# Patient Record
Sex: MALE | Race: WHITE | Employment: OTHER | URBAN - NONMETROPOLITAN AREA
[De-identification: names, ages, dates, MRNs, and addresses within clinical notes are randomized per-mention and may not be internally consistent; named-entity substitution may affect disease eponyms.]

---

## 2016-11-17 LAB — PROSTATE SPECIFIC ANTIGEN: 0.01 NG/ML

## 2017-02-13 ENCOUNTER — OFFICE VISIT (OUTPATIENT)
Dept: UROLOGY | Age: 67
End: 2017-02-13

## 2017-02-13 VITALS
BODY MASS INDEX: 42.18 KG/M2 | WEIGHT: 311.4 LBS | HEIGHT: 72 IN | SYSTOLIC BLOOD PRESSURE: 134 MMHG | DIASTOLIC BLOOD PRESSURE: 88 MMHG

## 2017-02-13 DIAGNOSIS — N52.31 ERECTILE DYSFUNCTION FOLLOWING RADICAL PROSTATECTOMY: ICD-10-CM

## 2017-02-13 DIAGNOSIS — C61 PROSTATE CANCER (HCC): ICD-10-CM

## 2017-02-13 DIAGNOSIS — R39.12 WEAK URINARY STREAM: Primary | ICD-10-CM

## 2017-02-13 LAB — POST VOID RESIDUAL (PVR): 97 ML

## 2017-02-13 PROCEDURE — 51798 US URINE CAPACITY MEASURE: CPT | Performed by: UROLOGY

## 2017-02-13 PROCEDURE — 99203 OFFICE O/P NEW LOW 30 MIN: CPT | Performed by: UROLOGY

## 2017-02-13 RX ORDER — LEVOTHYROXINE SODIUM 0.03 MG/1
25 TABLET ORAL DAILY
COMMUNITY

## 2017-02-13 RX ORDER — VITAMIN B COMPLEX
1 CAPSULE ORAL DAILY
COMMUNITY
End: 2017-06-12

## 2017-02-13 ASSESSMENT — ENCOUNTER SYMPTOMS
EYE PAIN: 0
NAUSEA: 0
ABDOMINAL PAIN: 0
CHEST TIGHTNESS: 0
SHORTNESS OF BREATH: 0
COLOR CHANGE: 0
EYE REDNESS: 0
BACK PAIN: 0

## 2017-02-15 ENCOUNTER — TELEPHONE (OUTPATIENT)
Dept: UROLOGY | Age: 67
End: 2017-02-15

## 2017-02-15 DIAGNOSIS — R39.12 WEAK URINARY STREAM: Primary | ICD-10-CM

## 2017-02-15 DIAGNOSIS — C61 PROSTATE CANCER (HCC): ICD-10-CM

## 2017-02-15 DIAGNOSIS — Z01.818 PRE-OP TESTING: ICD-10-CM

## 2017-02-15 DIAGNOSIS — I10 ESSENTIAL HYPERTENSION: ICD-10-CM

## 2017-02-16 ENCOUNTER — TELEPHONE (OUTPATIENT)
Dept: UROLOGY | Age: 67
End: 2017-02-16

## 2017-03-02 ENCOUNTER — TELEPHONE (OUTPATIENT)
Dept: UROLOGY | Age: 67
End: 2017-03-02

## 2017-03-02 DIAGNOSIS — Z01.818 PRE-OP TESTING: ICD-10-CM

## 2017-03-02 DIAGNOSIS — R39.12 WEAK URINARY STREAM: Primary | ICD-10-CM

## 2017-03-17 ENCOUNTER — TELEPHONE (OUTPATIENT)
Dept: UROLOGY | Age: 67
End: 2017-03-17

## 2017-03-24 ENCOUNTER — OFFICE VISIT (OUTPATIENT)
Dept: UROLOGY | Age: 67
End: 2017-03-24

## 2017-03-24 VITALS
BODY MASS INDEX: 41.31 KG/M2 | WEIGHT: 305 LBS | HEIGHT: 72 IN | SYSTOLIC BLOOD PRESSURE: 140 MMHG | DIASTOLIC BLOOD PRESSURE: 70 MMHG

## 2017-03-24 DIAGNOSIS — R39.12 WEAK URINARY STREAM: Primary | ICD-10-CM

## 2017-03-24 PROCEDURE — 99024 POSTOP FOLLOW-UP VISIT: CPT | Performed by: UROLOGY

## 2017-03-24 PROCEDURE — 51700 IRRIGATION OF BLADDER: CPT | Performed by: UROLOGY

## 2017-04-11 ENCOUNTER — TELEPHONE (OUTPATIENT)
Dept: UROLOGY | Age: 67
End: 2017-04-11

## 2017-04-11 DIAGNOSIS — R30.0 DYSURIA: Primary | ICD-10-CM

## 2017-04-11 DIAGNOSIS — C61 PROSTATE CANCER (HCC): Primary | ICD-10-CM

## 2017-04-13 ENCOUNTER — TELEPHONE (OUTPATIENT)
Dept: UROLOGY | Age: 67
End: 2017-04-13

## 2017-04-17 ENCOUNTER — TELEPHONE (OUTPATIENT)
Dept: UROLOGY | Age: 67
End: 2017-04-17

## 2017-04-17 RX ORDER — SULFAMETHOXAZOLE AND TRIMETHOPRIM 800; 160 MG/1; MG/1
1 TABLET ORAL 2 TIMES DAILY
Qty: 20 TABLET | Refills: 0 | Status: SHIPPED | OUTPATIENT
Start: 2017-04-17 | End: 2017-04-27

## 2017-04-27 ENCOUNTER — TELEPHONE (OUTPATIENT)
Dept: UROLOGY | Age: 67
End: 2017-04-27

## 2017-04-27 DIAGNOSIS — N39.0 URINARY TRACT INFECTION WITHOUT HEMATURIA, SITE UNSPECIFIED: ICD-10-CM

## 2017-04-27 DIAGNOSIS — R30.0 DYSURIA: Primary | ICD-10-CM

## 2017-05-01 ENCOUNTER — TELEPHONE (OUTPATIENT)
Dept: UROLOGY | Age: 67
End: 2017-05-01

## 2017-05-15 ENCOUNTER — TELEPHONE (OUTPATIENT)
Dept: UROLOGY | Age: 67
End: 2017-05-15

## 2017-05-15 RX ORDER — DOXYCYCLINE HYCLATE 100 MG
100 TABLET ORAL 2 TIMES DAILY
Qty: 20 TABLET | Refills: 0 | Status: SHIPPED | OUTPATIENT
Start: 2017-05-15 | End: 2017-05-25

## 2017-05-25 ENCOUNTER — TELEPHONE (OUTPATIENT)
Dept: UROLOGY | Age: 67
End: 2017-05-25

## 2017-05-25 DIAGNOSIS — R30.0 DYSURIA: Primary | ICD-10-CM

## 2017-05-25 RX ORDER — METHENAMINE, SODIUM PHOSPHATE, MONOBASIC, MONOHYDRATE, PHENYL SALICYLATE, METHYLENE BLUE, AND HYOSCYAMINE SULFATE 120; 40.8; 36; 10; .12 MG/1; MG/1; MG/1; MG/1; MG/1
1 CAPSULE ORAL 3 TIMES DAILY PRN
Qty: 30 CAPSULE | Refills: 1 | Status: SHIPPED | OUTPATIENT
Start: 2017-05-25 | End: 2017-08-03 | Stop reason: ALTCHOICE

## 2017-06-12 ENCOUNTER — OFFICE VISIT (OUTPATIENT)
Dept: UROLOGY | Age: 67
End: 2017-06-12

## 2017-06-12 VITALS
HEIGHT: 75 IN | DIASTOLIC BLOOD PRESSURE: 80 MMHG | BODY MASS INDEX: 38.42 KG/M2 | SYSTOLIC BLOOD PRESSURE: 142 MMHG | WEIGHT: 309 LBS

## 2017-06-12 DIAGNOSIS — R30.0 DYSURIA: Primary | ICD-10-CM

## 2017-06-12 DIAGNOSIS — N32.0 BLADDER NECK CONTRACTURE: ICD-10-CM

## 2017-06-12 DIAGNOSIS — R33.9 INCOMPLETE BLADDER EMPTYING: ICD-10-CM

## 2017-06-12 DIAGNOSIS — C61 PROSTATE CANCER (HCC): ICD-10-CM

## 2017-06-12 LAB
BILIRUBIN, POC: NORMAL
BLOOD URINE, POC: NORMAL
CLARITY, POC: CLEAR
COLOR, POC: YELLOW
GLUCOSE URINE, POC: NORMAL
KETONES, POC: NORMAL
LEUKOCYTE EST, POC: NORMAL
NITRITE, POC: NORMAL
PH, POC: 7
POST VOID RESIDUAL (PVR): 82 ML
PROTEIN, POC: NORMAL
SPECIFIC GRAVITY, POC: 1.01
UROBILINOGEN, POC: NORMAL

## 2017-06-12 PROCEDURE — 81003 URINALYSIS AUTO W/O SCOPE: CPT | Performed by: UROLOGY

## 2017-06-12 PROCEDURE — 99213 OFFICE O/P EST LOW 20 MIN: CPT | Performed by: UROLOGY

## 2017-06-12 PROCEDURE — 51798 US URINE CAPACITY MEASURE: CPT | Performed by: UROLOGY

## 2017-06-12 RX ORDER — BENAZEPRIL HYDROCHLORIDE 20 MG/1
20 TABLET ORAL 2 TIMES DAILY
COMMUNITY

## 2017-06-12 RX ORDER — HYDROCHLOROTHIAZIDE 25 MG/1
25 TABLET ORAL DAILY
COMMUNITY
End: 2017-08-03 | Stop reason: ALTCHOICE

## 2017-08-03 ENCOUNTER — OFFICE VISIT (OUTPATIENT)
Dept: SURGERY | Age: 67
End: 2017-08-03
Payer: MEDICARE

## 2017-08-03 VITALS
DIASTOLIC BLOOD PRESSURE: 84 MMHG | HEART RATE: 64 BPM | SYSTOLIC BLOOD PRESSURE: 144 MMHG | BODY MASS INDEX: 40.29 KG/M2 | WEIGHT: 304 LBS | HEIGHT: 73 IN | TEMPERATURE: 97.4 F | RESPIRATION RATE: 20 BRPM

## 2017-08-03 DIAGNOSIS — R10.33 PERIUMBILICAL ABDOMINAL PAIN: Primary | ICD-10-CM

## 2017-08-03 PROCEDURE — 99203 OFFICE O/P NEW LOW 30 MIN: CPT | Performed by: SURGERY

## 2017-08-03 PROCEDURE — G8427 DOCREV CUR MEDS BY ELIG CLIN: HCPCS | Performed by: SURGERY

## 2017-08-03 PROCEDURE — 3017F COLORECTAL CA SCREEN DOC REV: CPT | Performed by: SURGERY

## 2017-08-03 PROCEDURE — G8417 CALC BMI ABV UP PARAM F/U: HCPCS | Performed by: SURGERY

## 2017-08-03 PROCEDURE — 1036F TOBACCO NON-USER: CPT | Performed by: SURGERY

## 2017-08-03 RX ORDER — FUROSEMIDE 20 MG/1
20 TABLET ORAL
COMMUNITY

## 2017-08-03 ASSESSMENT — ENCOUNTER SYMPTOMS
PHOTOPHOBIA: 0
WHEEZING: 0
TROUBLE SWALLOWING: 0
EYE REDNESS: 0
EYE ITCHING: 0
SORE THROAT: 0
SINUS PRESSURE: 0
DIARRHEA: 0
BLOOD IN STOOL: 0
BACK PAIN: 0
RECTAL PAIN: 0
CONSTIPATION: 0
COLOR CHANGE: 0
COUGH: 0
ANAL BLEEDING: 0
ABDOMINAL DISTENTION: 0
EYE DISCHARGE: 0
ABDOMINAL PAIN: 0
STRIDOR: 0
NAUSEA: 0
SHORTNESS OF BREATH: 0
RHINORRHEA: 0
CHEST TIGHTNESS: 0
VOMITING: 0
EYE PAIN: 0
APNEA: 0
VOICE CHANGE: 0
CHOKING: 0
FACIAL SWELLING: 0

## 2017-09-11 ENCOUNTER — OFFICE VISIT (OUTPATIENT)
Dept: UROLOGY | Age: 67
End: 2017-09-11
Payer: MEDICARE

## 2017-09-11 VITALS
DIASTOLIC BLOOD PRESSURE: 88 MMHG | WEIGHT: 303.8 LBS | BODY MASS INDEX: 41.15 KG/M2 | SYSTOLIC BLOOD PRESSURE: 134 MMHG | HEIGHT: 72 IN

## 2017-09-11 DIAGNOSIS — C61 PROSTATE CANCER (HCC): Primary | ICD-10-CM

## 2017-09-11 DIAGNOSIS — N32.0 ACQUIRED CONTRACTURE OF BLADDER NECK: ICD-10-CM

## 2017-09-11 LAB
BILIRUBIN, POC: NORMAL
BLOOD URINE, POC: NORMAL
CLARITY, POC: CLEAR
COLOR, POC: YELLOW
GLUCOSE URINE, POC: NORMAL
KETONES, POC: NORMAL
LEUKOCYTE EST, POC: NORMAL
NITRITE, POC: NORMAL
PH, POC: 5.52
POST VOID RESIDUAL (PVR): 0 ML
PROTEIN, POC: NORMAL
SPECIFIC GRAVITY, POC: 1.01
UROBILINOGEN, POC: NORMAL

## 2017-09-11 PROCEDURE — 4040F PNEUMOC VAC/ADMIN/RCVD: CPT | Performed by: UROLOGY

## 2017-09-11 PROCEDURE — 81003 URINALYSIS AUTO W/O SCOPE: CPT | Performed by: UROLOGY

## 2017-09-11 PROCEDURE — G8427 DOCREV CUR MEDS BY ELIG CLIN: HCPCS | Performed by: UROLOGY

## 2017-09-11 PROCEDURE — 1123F ACP DISCUSS/DSCN MKR DOCD: CPT | Performed by: UROLOGY

## 2017-09-11 PROCEDURE — 1036F TOBACCO NON-USER: CPT | Performed by: UROLOGY

## 2017-09-11 PROCEDURE — 51798 US URINE CAPACITY MEASURE: CPT | Performed by: UROLOGY

## 2017-09-11 PROCEDURE — G8417 CALC BMI ABV UP PARAM F/U: HCPCS | Performed by: UROLOGY

## 2017-09-11 PROCEDURE — 3017F COLORECTAL CA SCREEN DOC REV: CPT | Performed by: UROLOGY

## 2017-09-11 PROCEDURE — 99213 OFFICE O/P EST LOW 20 MIN: CPT | Performed by: UROLOGY

## 2018-02-26 LAB
ALBUMIN SERPL-MCNC: 4.5 G/DL
ALP BLD-CCNC: 58 U/L
ALT SERPL-CCNC: 41 U/L
ANION GAP SERPL CALCULATED.3IONS-SCNC: NORMAL MMOL/L
AST SERPL-CCNC: 31 U/L
AVERAGE GLUCOSE: 151
BILIRUB SERPL-MCNC: 0.7 MG/DL (ref 0.1–1.4)
BUN BLDV-MCNC: 13 MG/DL
CALCIUM SERPL-MCNC: 9.3 MG/DL
CHLORIDE BLD-SCNC: 101 MMOL/L
CHOLESTEROL, TOTAL: 177 MG/DL
CHOLESTEROL/HDL RATIO: ABNORMAL
CO2: 25 MMOL/L
CREAT SERPL-MCNC: 0.8 MG/DL
GFR CALCULATED: NORMAL
GLUCOSE BLD-MCNC: 139 MG/DL
HBA1C MFR BLD: 6.9 %
HDLC SERPL-MCNC: 34 MG/DL (ref 35–70)
LDL CHOLESTEROL CALCULATED: 73 MG/DL (ref 0–160)
POTASSIUM SERPL-SCNC: 4.2 MMOL/L
SODIUM BLD-SCNC: 141 MMOL/L
TOTAL PROTEIN: 7
TRIGL SERPL-MCNC: 350 MG/DL
VLDLC SERPL CALC-MCNC: 70 MG/DL

## 2018-03-22 ENCOUNTER — OFFICE VISIT (OUTPATIENT)
Dept: UROLOGY | Age: 68
End: 2018-03-22
Payer: MEDICARE

## 2018-03-22 VITALS
WEIGHT: 296 LBS | SYSTOLIC BLOOD PRESSURE: 136 MMHG | DIASTOLIC BLOOD PRESSURE: 80 MMHG | HEIGHT: 72 IN | BODY MASS INDEX: 40.09 KG/M2

## 2018-03-22 DIAGNOSIS — C61 PROSTATE CA (HCC): Primary | ICD-10-CM

## 2018-03-22 DIAGNOSIS — N32.0 BLADDER NECK CONTRACTURE: ICD-10-CM

## 2018-03-22 LAB
BILIRUBIN, POC: NORMAL
BLOOD URINE, POC: NORMAL
CLARITY, POC: CLEAR
COLOR, POC: YELLOW
GLUCOSE URINE, POC: NORMAL
KETONES, POC: NORMAL
LEUKOCYTE EST, POC: NORMAL
NITRITE, POC: NORMAL
PH, POC: 6
PROTEIN, POC: NORMAL
SPECIFIC GRAVITY, POC: 1.02
UROBILINOGEN, POC: 0.2

## 2018-03-22 PROCEDURE — 1036F TOBACCO NON-USER: CPT | Performed by: NURSE PRACTITIONER

## 2018-03-22 PROCEDURE — 4040F PNEUMOC VAC/ADMIN/RCVD: CPT | Performed by: NURSE PRACTITIONER

## 2018-03-22 PROCEDURE — G8427 DOCREV CUR MEDS BY ELIG CLIN: HCPCS | Performed by: NURSE PRACTITIONER

## 2018-03-22 PROCEDURE — G8417 CALC BMI ABV UP PARAM F/U: HCPCS | Performed by: NURSE PRACTITIONER

## 2018-03-22 PROCEDURE — G8484 FLU IMMUNIZE NO ADMIN: HCPCS | Performed by: NURSE PRACTITIONER

## 2018-03-22 PROCEDURE — 81003 URINALYSIS AUTO W/O SCOPE: CPT | Performed by: NURSE PRACTITIONER

## 2018-03-22 PROCEDURE — 1123F ACP DISCUSS/DSCN MKR DOCD: CPT | Performed by: NURSE PRACTITIONER

## 2018-03-22 PROCEDURE — 99213 OFFICE O/P EST LOW 20 MIN: CPT | Performed by: NURSE PRACTITIONER

## 2018-03-22 PROCEDURE — 3017F COLORECTAL CA SCREEN DOC REV: CPT | Performed by: NURSE PRACTITIONER

## 2018-03-22 RX ORDER — FENOFIBRATE 145 MG/1
145 TABLET, COATED ORAL DAILY
COMMUNITY

## 2018-03-22 ASSESSMENT — ENCOUNTER SYMPTOMS
NAUSEA: 0
ABDOMINAL PAIN: 0
VOMITING: 0

## 2019-03-12 ENCOUNTER — TELEPHONE (OUTPATIENT)
Dept: UROLOGY | Age: 69
End: 2019-03-12

## 2019-03-13 LAB — PROSTATE SPECIFIC ANTIGEN: NORMAL NG/ML

## 2019-03-21 ENCOUNTER — TELEPHONE (OUTPATIENT)
Dept: UROLOGY | Age: 69
End: 2019-03-21

## 2019-03-21 ENCOUNTER — OFFICE VISIT (OUTPATIENT)
Dept: UROLOGY | Age: 69
End: 2019-03-21
Payer: MEDICARE

## 2019-03-21 VITALS
BODY MASS INDEX: 41.99 KG/M2 | WEIGHT: 310 LBS | HEIGHT: 72 IN | SYSTOLIC BLOOD PRESSURE: 136 MMHG | DIASTOLIC BLOOD PRESSURE: 80 MMHG

## 2019-03-21 DIAGNOSIS — N32.0 BLADDER NECK CONTRACTURE: ICD-10-CM

## 2019-03-21 DIAGNOSIS — N52.31 ERECTILE DYSFUNCTION AFTER RADICAL PROSTATECTOMY: ICD-10-CM

## 2019-03-21 DIAGNOSIS — C61 PROSTATE CA (HCC): Primary | ICD-10-CM

## 2019-03-21 LAB
BILIRUBIN, POC: NORMAL
BLOOD URINE, POC: NORMAL
CLARITY, POC: CLEAR
COLOR, POC: YELLOW
GLUCOSE URINE, POC: NORMAL
KETONES, POC: NORMAL
LEUKOCYTE EST, POC: NORMAL
NITRITE, POC: NORMAL
PH, POC: 7
POST VOID RESIDUAL (PVR): 0 ML
PROTEIN, POC: NORMAL
SPECIFIC GRAVITY, POC: 1.02
UROBILINOGEN, POC: 0.2

## 2019-03-21 PROCEDURE — 51798 US URINE CAPACITY MEASURE: CPT | Performed by: NURSE PRACTITIONER

## 2019-03-21 PROCEDURE — G8484 FLU IMMUNIZE NO ADMIN: HCPCS | Performed by: NURSE PRACTITIONER

## 2019-03-21 PROCEDURE — 1123F ACP DISCUSS/DSCN MKR DOCD: CPT | Performed by: NURSE PRACTITIONER

## 2019-03-21 PROCEDURE — 3017F COLORECTAL CA SCREEN DOC REV: CPT | Performed by: NURSE PRACTITIONER

## 2019-03-21 PROCEDURE — 99213 OFFICE O/P EST LOW 20 MIN: CPT | Performed by: NURSE PRACTITIONER

## 2019-03-21 PROCEDURE — G8427 DOCREV CUR MEDS BY ELIG CLIN: HCPCS | Performed by: NURSE PRACTITIONER

## 2019-03-21 PROCEDURE — 1101F PT FALLS ASSESS-DOCD LE1/YR: CPT | Performed by: NURSE PRACTITIONER

## 2019-03-21 PROCEDURE — 81003 URINALYSIS AUTO W/O SCOPE: CPT | Performed by: NURSE PRACTITIONER

## 2019-03-21 PROCEDURE — G8417 CALC BMI ABV UP PARAM F/U: HCPCS | Performed by: NURSE PRACTITIONER

## 2019-03-21 PROCEDURE — 1036F TOBACCO NON-USER: CPT | Performed by: NURSE PRACTITIONER

## 2019-03-21 PROCEDURE — 4040F PNEUMOC VAC/ADMIN/RCVD: CPT | Performed by: NURSE PRACTITIONER

## 2019-03-21 RX ORDER — LORAZEPAM 0.5 MG
TABLET ORAL
COMMUNITY
End: 2020-03-04

## 2019-03-21 ASSESSMENT — ENCOUNTER SYMPTOMS
VOMITING: 0
NAUSEA: 0
ABDOMINAL PAIN: 0

## 2019-10-28 LAB
ALBUMIN SERPL-MCNC: 4.3 G/DL
ALP BLD-CCNC: 62 U/L
ALT SERPL-CCNC: 41 U/L
ANION GAP SERPL CALCULATED.3IONS-SCNC: NORMAL MMOL/L
AST SERPL-CCNC: 30 U/L
AVERAGE GLUCOSE: 243
BASOPHILS ABSOLUTE: 0 /ΜL
BASOPHILS RELATIVE PERCENT: 0.8 %
BILIRUB SERPL-MCNC: 0.8 MG/DL (ref 0.1–1.4)
BUN BLDV-MCNC: 11 MG/DL
CALCIUM SERPL-MCNC: 9.3 MG/DL
CHLORIDE BLD-SCNC: 102 MMOL/L
CHOLESTEROL, TOTAL: 149 MG/DL
CHOLESTEROL/HDL RATIO: ABNORMAL
CO2: 21 MMOL/L
CREAT SERPL-MCNC: 0.5 MG/DL
EOSINOPHILS ABSOLUTE: 0.2 /ΜL
EOSINOPHILS RELATIVE PERCENT: 3.5 %
GFR CALCULATED: NORMAL
GLUCOSE BLD-MCNC: 259 MG/DL
HBA1C MFR BLD: 10.1 %
HCT VFR BLD CALC: 45 % (ref 41–53)
HDLC SERPL-MCNC: 30 MG/DL (ref 35–70)
HEMOGLOBIN: 16 G/DL (ref 13.5–17.5)
LDL CHOLESTEROL CALCULATED: ABNORMAL MG/DL (ref 0–160)
LYMPHOCYTES ABSOLUTE: 2 /ΜL
LYMPHOCYTES RELATIVE PERCENT: 41.9 %
MCH RBC QN AUTO: 34 PG
MCHC RBC AUTO-ENTMCNC: 35.6 G/DL
MCV RBC AUTO: 95.7 FL
MONOCYTES ABSOLUTE: 0.7 /ΜL
MONOCYTES RELATIVE PERCENT: 14.9 %
NEUTROPHILS ABSOLUTE: 1.9 /ΜL
NEUTROPHILS RELATIVE PERCENT: 38.3 %
PDW BLD-RTO: 11.4 %
PLATELET # BLD: 170 K/ΜL
PMV BLD AUTO: 10 FL
POTASSIUM SERPL-SCNC: 4 MMOL/L
RBC # BLD: 4.7 10^6/ΜL
SODIUM BLD-SCNC: 140 MMOL/L
TOTAL PROTEIN: 6.9
TRIGL SERPL-MCNC: 458 MG/DL
VLDLC SERPL CALC-MCNC: ABNORMAL MG/DL
WBC # BLD: 4.8 10^3/ML

## 2020-03-02 LAB — PROSTATE SPECIFIC ANTIGEN: NORMAL

## 2020-03-04 ENCOUNTER — OFFICE VISIT (OUTPATIENT)
Dept: UROLOGY | Age: 70
End: 2020-03-04
Payer: MEDICARE

## 2020-03-04 VITALS
WEIGHT: 297 LBS | HEIGHT: 72 IN | DIASTOLIC BLOOD PRESSURE: 70 MMHG | BODY MASS INDEX: 40.23 KG/M2 | SYSTOLIC BLOOD PRESSURE: 116 MMHG

## 2020-03-04 LAB
BILIRUBIN, POC: NORMAL
BLOOD URINE, POC: NORMAL
CLARITY, POC: CLEAR
COLOR, POC: YELLOW
GLUCOSE URINE, POC: NORMAL
KETONES, POC: NORMAL
LEUKOCYTE EST, POC: NORMAL
NITRITE, POC: NORMAL
PH, POC: 5.5
POST VOID RESIDUAL (PVR): 36 ML
PROTEIN, POC: NORMAL
SPECIFIC GRAVITY, POC: 1.02
UROBILINOGEN, POC: 1

## 2020-03-04 PROCEDURE — 1036F TOBACCO NON-USER: CPT | Performed by: NURSE PRACTITIONER

## 2020-03-04 PROCEDURE — G8427 DOCREV CUR MEDS BY ELIG CLIN: HCPCS | Performed by: NURSE PRACTITIONER

## 2020-03-04 PROCEDURE — G8484 FLU IMMUNIZE NO ADMIN: HCPCS | Performed by: NURSE PRACTITIONER

## 2020-03-04 PROCEDURE — G8417 CALC BMI ABV UP PARAM F/U: HCPCS | Performed by: NURSE PRACTITIONER

## 2020-03-04 PROCEDURE — 4040F PNEUMOC VAC/ADMIN/RCVD: CPT | Performed by: NURSE PRACTITIONER

## 2020-03-04 PROCEDURE — 99213 OFFICE O/P EST LOW 20 MIN: CPT | Performed by: NURSE PRACTITIONER

## 2020-03-04 PROCEDURE — 3017F COLORECTAL CA SCREEN DOC REV: CPT | Performed by: NURSE PRACTITIONER

## 2020-03-04 PROCEDURE — 1123F ACP DISCUSS/DSCN MKR DOCD: CPT | Performed by: NURSE PRACTITIONER

## 2020-03-04 PROCEDURE — 51798 US URINE CAPACITY MEASURE: CPT | Performed by: NURSE PRACTITIONER

## 2020-03-04 PROCEDURE — 81003 URINALYSIS AUTO W/O SCOPE: CPT | Performed by: NURSE PRACTITIONER

## 2020-03-04 RX ORDER — ACETAMINOPHEN 160 MG
TABLET,DISINTEGRATING ORAL
COMMUNITY

## 2020-03-04 RX ORDER — MULTIVITAMIN WITH IRON
250 TABLET ORAL 2 TIMES DAILY
COMMUNITY

## 2020-03-04 RX ORDER — SILDENAFIL CITRATE 20 MG/1
20 TABLET ORAL PRN
COMMUNITY
End: 2022-05-18

## 2020-03-04 ASSESSMENT — ENCOUNTER SYMPTOMS
ABDOMINAL PAIN: 0
BACK PAIN: 0

## 2020-03-04 NOTE — PROGRESS NOTES
History  Ashok  has a past medical history of Arthritis, Cervical pain, History of kidney stones, Hypertension, DELMER on CPAP, Prostate cancer ([address] Shingles, Strain of quadriceps muscle, and Thyroid disease. Past Surgical History  The patient  has a past surgical history that includes Prostate surgery (12/2008); Leg Tendon Surgery (03/2008); hernia repair (2008); Tonsillectomy (1958); Colonoscopy (03/10/2017); Cystocopy (03/20/2017); and Neck surgery (09/2017). Family History  This patient's family history includes Dementia in his father. Social History  Masoud Jules  reports that he has never smoked. He has never used smokeless tobacco. He reports current alcohol use. He reports that he does not use drugs. Subjective:      Review of Systems   Constitutional: Negative for activity change, appetite change, chills, diaphoresis, fatigue, fever and unexpected weight change. Gastrointestinal: Negative for abdominal pain. Genitourinary: Negative for decreased urine volume, difficulty urinating, dysuria, flank pain, frequency, hematuria and urgency. Musculoskeletal: Negative for back pain. Objective:   /70   Ht 6' (1.829 m)   Wt 297 lb (134.7 kg)   BMI 40.28 kg/m²     Physical Exam  Constitutional:       General: He is not in acute distress. Appearance: Normal appearance. He is not ill-appearing. HENT:      Head: Normocephalic and atraumatic. Eyes:      General: No scleral icterus. Right eye: No discharge. Left eye: No discharge. Cardiovascular:      Rate and Rhythm: Normal rate and regular rhythm. Heart sounds: Normal heart sounds. Pulmonary:      Effort: Pulmonary effort is normal. No respiratory distress. Breath sounds: Normal breath sounds. Abdominal:      General: There is no distension. Tenderness: There is no abdominal tenderness. Skin:     General: Skin is warm and dry. Capillary Refill: Capillary refill takes less than 2 seconds. Neurological:      General: No focal deficit present. Mental Status: He is alert. Psychiatric:         Mood and Affect: Mood normal.         Behavior: Behavior normal.         POC  No results found for this visit on 03/04/20. Patients recent PSA values are as follows  Lab Results   Component Value Date    PSA  03/02/2020      Comment:      <0.03    PSA  03/13/2019      Comment:      <0.03    PSA 0.01 11/17/2016        Recent BUN/Creatinine:  Lab Results   Component Value Date    BUN 11 10/28/2019    CREATININE 0.5 10/28/2019         Assessment:   Prostate cancer  Hx of bladder neck contracture  Erectile dysfunction  Hx kidney stones    Plan:     Pt urinating well. PVR 36 mls. PSA undetectable. Discussed treatment for ED. Pt reports he has sildenafil at home and he may try it. However this isn't a major priority for him. F/u in 1 year with PVR. PSA prior to appt.

## 2021-02-10 LAB
BUN BLDV-MCNC: 17 MG/DL
CALCIUM SERPL-MCNC: 9.4 MG/DL
CHLORIDE BLD-SCNC: 102 MMOL/L
CO2: 25 MMOL/L
CREAT SERPL-MCNC: 0.8 MG/DL
GFR CALCULATED: >60
GLUCOSE BLD-MCNC: 125 MG/DL
POTASSIUM SERPL-SCNC: 4.1 MMOL/L
PROSTATE SPECIFIC ANTIGEN: NORMAL
SODIUM BLD-SCNC: 136 MMOL/L

## 2021-02-11 ENCOUNTER — TELEPHONE (OUTPATIENT)
Dept: UROLOGY | Age: 71
End: 2021-02-11

## 2021-03-03 ENCOUNTER — OFFICE VISIT (OUTPATIENT)
Dept: UROLOGY | Age: 71
End: 2021-03-03
Payer: MEDICARE

## 2021-03-03 VITALS — BODY MASS INDEX: 40.36 KG/M2 | WEIGHT: 298 LBS | TEMPERATURE: 97.4 F | HEIGHT: 72 IN

## 2021-03-03 DIAGNOSIS — C61 PROSTATE CANCER (HCC): Primary | ICD-10-CM

## 2021-03-03 DIAGNOSIS — Z87.442 PERSONAL HISTORY OF KIDNEY STONES: ICD-10-CM

## 2021-03-03 DIAGNOSIS — R39.89 OTHER SYMPTOMS AND SIGNS INVOLVING THE GENITOURINARY SYSTEM: ICD-10-CM

## 2021-03-03 LAB
BILIRUBIN, POC: NORMAL
BLOOD URINE, POC: NORMAL
CLARITY, POC: CLEAR
COLOR, POC: YELLOW
GLUCOSE URINE, POC: 100
KETONES, POC: NORMAL
LEUKOCYTE EST, POC: NORMAL
NITRITE, POC: NORMAL
PH, POC: 6.5
POST VOID RESIDUAL (PVR): 46 ML
PROTEIN, POC: NORMAL
SPECIFIC GRAVITY, POC: 1.02
UROBILINOGEN, POC: 0.2

## 2021-03-03 PROCEDURE — G8427 DOCREV CUR MEDS BY ELIG CLIN: HCPCS | Performed by: NURSE PRACTITIONER

## 2021-03-03 PROCEDURE — 1123F ACP DISCUSS/DSCN MKR DOCD: CPT | Performed by: NURSE PRACTITIONER

## 2021-03-03 PROCEDURE — 4040F PNEUMOC VAC/ADMIN/RCVD: CPT | Performed by: NURSE PRACTITIONER

## 2021-03-03 PROCEDURE — 51798 US URINE CAPACITY MEASURE: CPT | Performed by: NURSE PRACTITIONER

## 2021-03-03 PROCEDURE — G8484 FLU IMMUNIZE NO ADMIN: HCPCS | Performed by: NURSE PRACTITIONER

## 2021-03-03 PROCEDURE — 3017F COLORECTAL CA SCREEN DOC REV: CPT | Performed by: NURSE PRACTITIONER

## 2021-03-03 PROCEDURE — 1036F TOBACCO NON-USER: CPT | Performed by: NURSE PRACTITIONER

## 2021-03-03 PROCEDURE — 99214 OFFICE O/P EST MOD 30 MIN: CPT | Performed by: NURSE PRACTITIONER

## 2021-03-03 PROCEDURE — 81003 URINALYSIS AUTO W/O SCOPE: CPT | Performed by: NURSE PRACTITIONER

## 2021-03-03 PROCEDURE — G8417 CALC BMI ABV UP PARAM F/U: HCPCS | Performed by: NURSE PRACTITIONER

## 2021-03-03 RX ORDER — PIOGLITAZONEHYDROCHLORIDE 15 MG/1
15 TABLET ORAL DAILY
COMMUNITY

## 2021-03-03 ASSESSMENT — ENCOUNTER SYMPTOMS
NAUSEA: 0
VOMITING: 0
ABDOMINAL PAIN: 0
BACK PAIN: 0

## 2021-03-03 NOTE — PROGRESS NOTES
[address] [address]  Dept: [phone]  Loc: [phone]    Visit Date: 3/3/2021        HPI:     Diane Suarez is a 78 y.o. male who presents today for:  Chief Complaint   Patient presents with    Follow-up     psa prior    Prostate Cancer     Hx of bladder neck contracture       HPI   Pt seen in follow up for prostate cancer and bladder neck contracture. Pt underwent RALRP 12/2008. [place] 3+4=7 [id number] prostate cancer. He underwent TURBN for bladder neck contracture 3/2017. Also has hx of kidney stone, ED and decreased libido. Notes some worsening in frequency and urgency on days he takes lasix. Urinates every 1.5-2 hours. Denies dysuria, hematuria, issues with emptying. Occasional leaking with urgency. Current Outpatient Medications   Medication Sig Dispense Refill    pioglitazone (ACTOS) 15 MG tablet Take 15 mg by mouth daily      metFORMIN (GLUCOPHAGE) 500 MG tablet Take 500 mg by mouth 2 times daily (with meals)      magnesium (MAGNESIUM-OXIDE) 250 MG TABS tablet Take 250 mg by mouth 2 times daily      Cholecalciferol (VITAMIN D3) 50 MCG (2000 UT) CAPS Take by mouth      fenofibrate (TRICOR) 145 MG tablet Take 145 mg by mouth daily s [facility] pharmacy: Please dispense generic fenofibrate unless prescriber denote       furosemide (LASIX) 20 MG tablet Take 20 mg by mouth daily as needed      Lysine HCl 500 MG CAPS Take 1 capsule by mouth daily      benazepril (LOTENSIN) 20 MG tablet Take 20 mg by mouth 2 times daily       levothyroxine (SYNTHROID) 25 MCG tablet Take 25 mcg by mouth Daily      Multiple Vitamins-Minerals (CENTRUM SILVER ADULT 50+ PO) Take by mouth      metoprolol (TOPROL-XL) 50 MG XL tablet Take 50 mg by mouth 2 times daily       sildenafil (REVATIO) 20 MG tablet Take 20 mg by mouth as needed       No current facility-administered medications for this visit. Past Medical History  Vania Thrasher  has a past medical history of Arthritis, Cervical pain, History of kidney stones, Hypertension, DELMER on CPAP, Prostate cancer ([address] Shingles, Strain of quadriceps muscle, and Thyroid disease. Past Surgical History  The patient  has a past surgical history that includes Prostate surgery (12/2008); Leg Tendon Surgery (03/2008); hernia repair (2008); Tonsillectomy (1958); Colonoscopy (03/10/2017); Cystocopy (03/20/2017); and Neck surgery (09/2017). Family History  This patient's family history includes Dementia in his father. Social History  Vania Thrasher  reports that he has never smoked. He has never used smokeless tobacco. He reports current alcohol use. He reports that he does not use drugs. Subjective:      Review of Systems   Constitutional: Negative for activity change, appetite change, chills, diaphoresis, fatigue, fever and unexpected weight change. Gastrointestinal: Negative for abdominal pain, nausea and vomiting. Genitourinary: Negative for decreased urine volume, difficulty urinating, dysuria, flank pain, frequency, hematuria and urgency. Musculoskeletal: Negative for back pain. Objective:   Temp 97.4 °F (36.3 °C)   Ht 6' (1.829 m)   Wt 298 lb (135.2 kg)   BMI 40.42 kg/m²     Physical Exam  Vitals signs reviewed. Constitutional:       General: He is not in acute distress. Appearance: Normal appearance. He is well-developed. He is not ill-appearing or diaphoretic. HENT:      Head: Normocephalic and atraumatic. Right Ear: External ear normal.      Left Ear: External ear normal.      Nose: Nose normal.      Mouth/Throat:      Mouth: Mucous membranes are moist.   Eyes:      General: No scleral icterus. Right eye: No discharge. Left eye: No discharge. Neck:      Vascular: No JVD. Trachea: No tracheal deviation. Pulmonary:      Effort: Pulmonary effort is normal. No respiratory distress.    Abdominal:      General: There is no distension. Tenderness: There is no abdominal tenderness. There is no right CVA tenderness or left CVA tenderness. Hernia: There is no hernia in the left inguinal area. Genitourinary:     Penis: No erythema, tenderness or discharge. Testes:         Right: Mass, tenderness or swelling not present. Left: Mass, tenderness or swelling not present. Prostate: Not tender. Musculoskeletal: Normal range of motion. Neurological:      Mental Status: He is alert and oriented to person, place, and time. Mental status is at baseline. Psychiatric:         Mood and Affect: Mood normal.         Behavior: Behavior normal.         Thought Content: Thought content normal.         POC  No results found for this visit on 03/03/21. Patients recent PSA values are as follows  Lab Results   Component Value Date    PSA  02/10/2021      Comment:      <0.064    PSA  03/02/2020      Comment:      <0.03    PSA  03/13/2019      Comment:      <0.03        Recent BUN/Creatinine:  Lab Results   Component Value Date    BUN 17 02/10/2021    CREATININE 0.80 02/10/2021         Assessment:   Prostate cancer  Hx of bladder neck contracture  Erectile dysfunction  Hx kidney stones    Plan:     Discussed trial of trospium for urinary frequency, urgency. PVR 46 mls. Pt would like to hold off on medication at this time but will call if he changes his mind. Pt has hx of kidney stone 4-5 years ago and would like to check KUB to eval stone burden. F/u in 1 year with PVR. KUB now--call results.

## 2021-03-21 ENCOUNTER — APPOINTMENT (OUTPATIENT)
Dept: CT IMAGING | Age: 71
End: 2021-03-21
Payer: MEDICARE

## 2021-03-21 ENCOUNTER — HOSPITAL ENCOUNTER (OUTPATIENT)
Age: 71
Setting detail: OBSERVATION
Discharge: HOME | End: 2021-03-22
Attending: EMERGENCY MEDICINE
Payer: MEDICARE

## 2021-03-21 DIAGNOSIS — R29.810 FACIAL DROOP: Primary | ICD-10-CM

## 2021-03-21 PROBLEM — G45.9 TIA (TRANSIENT ISCHEMIC ATTACK): Status: ACTIVE | Noted: 2021-03-21

## 2021-03-21 LAB
ALBUMIN SERPL-MCNC: 4.4 G/DL (ref 3.5–5.1)
ALP BLD-CCNC: 36 U/L (ref 38–126)
ALT SERPL-CCNC: 29 U/L (ref 11–66)
ANION GAP SERPL CALCULATED.3IONS-SCNC: 9 MEQ/L (ref 8–16)
AST SERPL-CCNC: 23 U/L (ref 5–40)
BASOPHILS # BLD: 0.9 %
BASOPHILS ABSOLUTE: 0.1 THOU/MM3 (ref 0–0.1)
BILIRUB SERPL-MCNC: 0.4 MG/DL (ref 0.3–1.2)
BILIRUBIN URINE: NEGATIVE
BLOOD, URINE: NEGATIVE
BUN BLDV-MCNC: 26 MG/DL (ref 7–22)
CALCIUM SERPL-MCNC: 9.9 MG/DL (ref 8.5–10.5)
CHARACTER, URINE: CLEAR
CHLORIDE BLD-SCNC: 97 MEQ/L (ref 98–111)
CO2: 28 MEQ/L (ref 23–33)
COLOR: YELLOW
CREAT SERPL-MCNC: 1 MG/DL (ref 0.4–1.2)
EKG ATRIAL RATE: 82 BPM
EKG P AXIS: 33 DEGREES
EKG P-R INTERVAL: 162 MS
EKG Q-T INTERVAL: 374 MS
EKG QRS DURATION: 90 MS
EKG QTC CALCULATION (BAZETT): 436 MS
EKG R AXIS: -3 DEGREES
EKG T AXIS: 9 DEGREES
EKG VENTRICULAR RATE: 82 BPM
EOSINOPHIL # BLD: 2.9 %
EOSINOPHILS ABSOLUTE: 0.2 THOU/MM3 (ref 0–0.4)
ERYTHROCYTE [DISTWIDTH] IN BLOOD BY AUTOMATED COUNT: 11.2 % (ref 11.5–14.5)
ERYTHROCYTE [DISTWIDTH] IN BLOOD BY AUTOMATED COUNT: 40.9 FL (ref 35–45)
FOLATE: 14.7 NG/ML (ref 4.8–24.2)
GFR SERPL CREATININE-BSD FRML MDRD: 74 ML/MIN/1.73M2
GLUCOSE BLD-MCNC: 191 MG/DL (ref 70–108)
GLUCOSE BLD-MCNC: 224 MG/DL (ref 70–108)
GLUCOSE BLD-MCNC: 255 MG/DL (ref 70–108)
GLUCOSE BLD-MCNC: 265 MG/DL (ref 70–108)
GLUCOSE URINE: >= 1000 MG/DL
HCT VFR BLD CALC: 44.6 % (ref 42–52)
HEMOGLOBIN: 15.1 GM/DL (ref 14–18)
IMMATURE GRANS (ABS): 0.06 THOU/MM3 (ref 0–0.07)
IMMATURE GRANULOCYTES: 0.9 %
KETONES, URINE: NEGATIVE
LEUKOCYTE ESTERASE, URINE: NEGATIVE
LYMPHOCYTES # BLD: 40.2 %
LYMPHOCYTES ABSOLUTE: 2.6 THOU/MM3 (ref 1–4.8)
MCH RBC QN AUTO: 33.6 PG (ref 26–33)
MCHC RBC AUTO-ENTMCNC: 33.9 GM/DL (ref 32.2–35.5)
MCV RBC AUTO: 99.1 FL (ref 80–94)
MONOCYTES # BLD: 11.7 %
MONOCYTES ABSOLUTE: 0.8 THOU/MM3 (ref 0.4–1.3)
NITRITE, URINE: NEGATIVE
NUCLEATED RED BLOOD CELLS: 0 /100 WBC
OSMOLALITY CALCULATION: 281.7 MOSMOL/KG (ref 275–300)
PH UA: 6 (ref 5–9)
PLATELET # BLD: 215 THOU/MM3 (ref 130–400)
PMV BLD AUTO: 10.1 FL (ref 9.4–12.4)
POTASSIUM REFLEX MAGNESIUM: 4.6 MEQ/L (ref 3.5–5.2)
PRO-BNP: 44 PG/ML (ref 0–900)
PROTEIN UA: NEGATIVE
RBC # BLD: 4.5 MILL/MM3 (ref 4.7–6.1)
SEG NEUTROPHILS: 43.4 %
SEGMENTED NEUTROPHILS ABSOLUTE COUNT: 2.8 THOU/MM3 (ref 1.8–7.7)
SODIUM BLD-SCNC: 134 MEQ/L (ref 135–145)
SPECIFIC GRAVITY, URINE: > 1.03 (ref 1–1.03)
TOTAL PROTEIN: 7.3 G/DL (ref 6.1–8)
TROPONIN T: < 0.01 NG/ML
TSH SERPL DL<=0.05 MIU/L-ACNC: 1.92 UIU/ML (ref 0.4–4.2)
UROBILINOGEN, URINE: 0.2 EU/DL (ref 0–1)
VITAMIN B-12: 370 PG/ML (ref 211–911)
WBC # BLD: 6.5 THOU/MM3 (ref 4.8–10.8)

## 2021-03-21 PROCEDURE — 6370000000 HC RX 637 (ALT 250 FOR IP): Performed by: INTERNAL MEDICINE

## 2021-03-21 PROCEDURE — 81003 URINALYSIS AUTO W/O SCOPE: CPT

## 2021-03-21 PROCEDURE — 93005 ELECTROCARDIOGRAM TRACING: CPT | Performed by: STUDENT IN AN ORGANIZED HEALTH CARE EDUCATION/TRAINING PROGRAM

## 2021-03-21 PROCEDURE — 2580000003 HC RX 258: Performed by: STUDENT IN AN ORGANIZED HEALTH CARE EDUCATION/TRAINING PROGRAM

## 2021-03-21 PROCEDURE — 96361 HYDRATE IV INFUSION ADD-ON: CPT

## 2021-03-21 PROCEDURE — 94660 CPAP INITIATION&MGMT: CPT

## 2021-03-21 PROCEDURE — 80053 COMPREHEN METABOLIC PANEL: CPT

## 2021-03-21 PROCEDURE — 70498 CT ANGIOGRAPHY NECK: CPT

## 2021-03-21 PROCEDURE — 99219 PR INITIAL OBSERVATION CARE/DAY 50 MINUTES: CPT | Performed by: INTERNAL MEDICINE

## 2021-03-21 PROCEDURE — 96360 HYDRATION IV INFUSION INIT: CPT

## 2021-03-21 PROCEDURE — 70496 CT ANGIOGRAPHY HEAD: CPT

## 2021-03-21 PROCEDURE — 2580000003 HC RX 258: Performed by: INTERNAL MEDICINE

## 2021-03-21 PROCEDURE — 84484 ASSAY OF TROPONIN QUANT: CPT

## 2021-03-21 PROCEDURE — 6370000000 HC RX 637 (ALT 250 FOR IP): Performed by: STUDENT IN AN ORGANIZED HEALTH CARE EDUCATION/TRAINING PROGRAM

## 2021-03-21 PROCEDURE — 93010 ELECTROCARDIOGRAM REPORT: CPT | Performed by: INTERNAL MEDICINE

## 2021-03-21 PROCEDURE — 70450 CT HEAD/BRAIN W/O DYE: CPT

## 2021-03-21 PROCEDURE — 82607 VITAMIN B-12: CPT

## 2021-03-21 PROCEDURE — 36415 COLL VENOUS BLD VENIPUNCTURE: CPT

## 2021-03-21 PROCEDURE — 99285 EMERGENCY DEPT VISIT HI MDM: CPT

## 2021-03-21 PROCEDURE — 84443 ASSAY THYROID STIM HORMONE: CPT

## 2021-03-21 PROCEDURE — G0378 HOSPITAL OBSERVATION PER HR: HCPCS

## 2021-03-21 PROCEDURE — 82746 ASSAY OF FOLIC ACID SERUM: CPT

## 2021-03-21 PROCEDURE — 6360000004 HC RX CONTRAST MEDICATION: Performed by: EMERGENCY MEDICINE

## 2021-03-21 PROCEDURE — 82948 REAGENT STRIP/BLOOD GLUCOSE: CPT

## 2021-03-21 PROCEDURE — 99443 PR PHYS/QHP TELEPHONE EVALUATION 21-30 MIN: CPT | Performed by: PSYCHIATRY & NEUROLOGY

## 2021-03-21 PROCEDURE — 85025 COMPLETE CBC W/AUTO DIFF WBC: CPT

## 2021-03-21 PROCEDURE — 6360000002 HC RX W HCPCS: Performed by: INTERNAL MEDICINE

## 2021-03-21 PROCEDURE — 83880 ASSAY OF NATRIURETIC PEPTIDE: CPT

## 2021-03-21 RX ORDER — ASPIRIN 81 MG/1
81 TABLET ORAL DAILY
Status: DISCONTINUED | OUTPATIENT
Start: 2021-03-22 | End: 2021-03-22 | Stop reason: HOSPADM

## 2021-03-21 RX ORDER — ONDANSETRON 2 MG/ML
4 INJECTION INTRAMUSCULAR; INTRAVENOUS EVERY 6 HOURS PRN
Status: DISCONTINUED | OUTPATIENT
Start: 2021-03-21 | End: 2021-03-22 | Stop reason: HOSPADM

## 2021-03-21 RX ORDER — DEXTROSE MONOHYDRATE 50 MG/ML
100 INJECTION, SOLUTION INTRAVENOUS PRN
Status: DISCONTINUED | OUTPATIENT
Start: 2021-03-21 | End: 2021-03-22 | Stop reason: HOSPADM

## 2021-03-21 RX ORDER — POLYETHYLENE GLYCOL 3350 17 G/17G
17 POWDER, FOR SOLUTION ORAL DAILY PRN
Status: DISCONTINUED | OUTPATIENT
Start: 2021-03-21 | End: 2021-03-22 | Stop reason: HOSPADM

## 2021-03-21 RX ORDER — SODIUM CHLORIDE 9 MG/ML
INJECTION, SOLUTION INTRAVENOUS CONTINUOUS
Status: DISCONTINUED | OUTPATIENT
Start: 2021-03-21 | End: 2021-03-22 | Stop reason: HOSPADM

## 2021-03-21 RX ORDER — CLOPIDOGREL 300 MG/1
300 TABLET, FILM COATED ORAL ONCE
Status: COMPLETED | OUTPATIENT
Start: 2021-03-21 | End: 2021-03-21

## 2021-03-21 RX ORDER — SODIUM CHLORIDE 0.9 % (FLUSH) 0.9 %
10 SYRINGE (ML) INJECTION PRN
Status: DISCONTINUED | OUTPATIENT
Start: 2021-03-21 | End: 2021-03-22 | Stop reason: HOSPADM

## 2021-03-21 RX ORDER — ASPIRIN 81 MG/1
324 TABLET, CHEWABLE ORAL ONCE
Status: COMPLETED | OUTPATIENT
Start: 2021-03-21 | End: 2021-03-21

## 2021-03-21 RX ORDER — 0.9 % SODIUM CHLORIDE 0.9 %
1000 INTRAVENOUS SOLUTION INTRAVENOUS ONCE
Status: COMPLETED | OUTPATIENT
Start: 2021-03-21 | End: 2021-03-21

## 2021-03-21 RX ORDER — NICOTINE POLACRILEX 4 MG
15 LOZENGE BUCCAL PRN
Status: DISCONTINUED | OUTPATIENT
Start: 2021-03-21 | End: 2021-03-22 | Stop reason: HOSPADM

## 2021-03-21 RX ORDER — ASPIRIN 300 MG/1
300 SUPPOSITORY RECTAL DAILY
Status: DISCONTINUED | OUTPATIENT
Start: 2021-03-22 | End: 2021-03-22 | Stop reason: HOSPADM

## 2021-03-21 RX ORDER — ATORVASTATIN CALCIUM 80 MG/1
80 TABLET, FILM COATED ORAL NIGHTLY
Status: DISCONTINUED | OUTPATIENT
Start: 2021-03-21 | End: 2021-03-22 | Stop reason: HOSPADM

## 2021-03-21 RX ORDER — SODIUM CHLORIDE 0.9 % (FLUSH) 0.9 %
10 SYRINGE (ML) INJECTION EVERY 12 HOURS SCHEDULED
Status: DISCONTINUED | OUTPATIENT
Start: 2021-03-21 | End: 2021-03-22 | Stop reason: HOSPADM

## 2021-03-21 RX ORDER — PROMETHAZINE HYDROCHLORIDE 25 MG/1
12.5 TABLET ORAL EVERY 6 HOURS PRN
Status: DISCONTINUED | OUTPATIENT
Start: 2021-03-21 | End: 2021-03-22 | Stop reason: HOSPADM

## 2021-03-21 RX ORDER — DEXTROSE MONOHYDRATE 25 G/50ML
12.5 INJECTION, SOLUTION INTRAVENOUS PRN
Status: DISCONTINUED | OUTPATIENT
Start: 2021-03-21 | End: 2021-03-22 | Stop reason: HOSPADM

## 2021-03-21 RX ADMIN — SODIUM CHLORIDE: 9 INJECTION, SOLUTION INTRAVENOUS at 18:22

## 2021-03-21 RX ADMIN — ATORVASTATIN CALCIUM 80 MG: 80 TABLET, FILM COATED ORAL at 19:43

## 2021-03-21 RX ADMIN — CLOPIDOGREL BISULFATE 300 MG: 300 TABLET, FILM COATED ORAL at 15:56

## 2021-03-21 RX ADMIN — ENOXAPARIN SODIUM 40 MG: 40 INJECTION SUBCUTANEOUS at 19:43

## 2021-03-21 RX ADMIN — IOPAMIDOL 80 ML: 755 INJECTION, SOLUTION INTRAVENOUS at 15:01

## 2021-03-21 RX ADMIN — SODIUM CHLORIDE: 9 INJECTION, SOLUTION INTRAVENOUS at 15:59

## 2021-03-21 RX ADMIN — SODIUM CHLORIDE 1000 ML: 9 INJECTION, SOLUTION INTRAVENOUS at 15:07

## 2021-03-21 RX ADMIN — ASPIRIN 324 MG: 81 TABLET, CHEWABLE ORAL at 15:56

## 2021-03-21 RX ADMIN — INSULIN LISPRO 2 UNITS: 100 INJECTION, SOLUTION INTRAVENOUS; SUBCUTANEOUS at 19:39

## 2021-03-21 ASSESSMENT — ENCOUNTER SYMPTOMS
CHEST TIGHTNESS: 0
BLOOD IN STOOL: 0
RHINORRHEA: 0
CONSTIPATION: 0
ABDOMINAL PAIN: 0
PHOTOPHOBIA: 0
COUGH: 0
FACIAL SWELLING: 0
ANAL BLEEDING: 0
ABDOMINAL DISTENTION: 0
SINUS PAIN: 0
NAUSEA: 0
BACK PAIN: 0
SINUS PRESSURE: 0
TROUBLE SWALLOWING: 0
SHORTNESS OF BREATH: 0
DIARRHEA: 0
VOICE CHANGE: 0
CHOKING: 0
RECTAL PAIN: 0
VOMITING: 0
SORE THROAT: 0
WHEEZING: 0
STRIDOR: 0

## 2021-03-21 NOTE — ACP (ADVANCE CARE PLANNING)
Advance Care Planning   Advance Care Planning Clinical Specialist  Conversation Note      Date of ACP Conversation: 3/21/2021    Conversation Conducted with:   Patient with Decision Making Capacity   Healthcare Decision Maker: Next of Kin by law (only applies in absence of above) Juan Luis Butler wife    ACP Clinical Specialist: Emma Lee 89   Who do you trust to make healthcare decisions for you? Name:   Juan Luis Butler   wife  Phone  Number: [phone]  Can this person be reached and be able to respond quickly, such as within a few minutes or hours? Yes    Who would be your back-up decision maker? Name Zaire Mckeon, daughter  Phone Number:[phone]      Hospitalization  If your health were to worsen and it became clear that your chance of recovery was unlikely, what would your preferences be regarding hospitalization?:    Choice:  [x]  The patient would want hospitalization  []  The patient would prefer comfort-focused treatment without hospitalization. Ventilation  If you were in your present state of health and suddenly became very ill and were unable to breathe on your own, what would your preference be about the use of a ventilator (breathing machine) if it were available to you? If patient would desire the use of a ventilator (breathing machine), answer \"yes\", if not \"no\":yes    If your health were to worsen and it became clear that your chance of recovery was unlikely, would that change your answer? Yes    Resuscitation  CPR works best to restart the heart when there is a sudden event, like a heart attack, in someone who is otherwise healthy. Unfortunately, CPR does not typically restart the heart for people who have serious health conditions or who are very sick. In the event your heart stopped, would you want attempts to restart your heart (answer \"yes\") or would you prefer a natural death (answer \"no\")?  yes    If your health were to worsen and

## 2021-03-21 NOTE — H&P
History & Physical        Patient:  Norm Schaumann  YOB: 1950    MRN: [id number]     Acct: [de-identified]    PCP: [address]    Date of Admission: 3/21/2021    Date of Service: Pt seen/examined on 03/21/21  and Admitted to Observation with expected LOS less than two midnights due to medical therapy. ASSESSMENT/PLAN:    Active Hospital Problems    Diagnosis Date Noted    TIA (transient ischemic attack) [G45.9] 03/21/2021         1. Left facial droop: left facial droop, slurred speech. DDx includes stroke vs TIA (although deficit still present upon my exam), and Bell's palsy among other possiblities. LKW was 1am.  Telestroke consulted in ED, loaded ASA, plavix, and admit for stroke workup. 1. Consult Neurology  2. MRI of brain ordered  3. CT head without bleed or major stroke, CTA H&N wet read with ? RVA occlusion with collateral circulation  4. Continue ASA po/pr daily (decision of plavix per Neurology)  5. Telemetry, permissive HTN  6. Echo TBD in am  7. NPO, SLP eval, IVF  8. Lipid and A1c  9. Neurochecks  10. PT/OT  2. T2DM with Hyperglycemia: metformin, actos pta. SSI while in house. A1C ordered for am. DM diet when not NPO. 3. HTN: benazapril and lopressor - hold for permissive HTN  4. Mild Hyponatremia/psuedohyponatremia: will repeat BMP in am, likely volume depleted related to hyperglycemia  5. Macrocytosis: check TSH, B12 and folate. 6. DELMRE: patient did not bring home unit, will use hospital supplied. 7. Hypothyroidism: continue home synthroid, check TSH  8. HLD: high intensity statin  9. Hx of Cervical Pain: noted, hx of surgery. 10. Hx of prostate cancer and bladder neck contracture s/p surgery. Takes lasix 3x weekly for urine flow.    11. Obesity: counseling on weight loss.           =========================================================        Chief Complaint: Facial droop    History Of Present Illness:  Norm Schaumann is a 78 y.o. male with PMHx of hypertension, DELMER, hypothyroidism, prostate disease, type 2 diabetes, lifetime non-smoker, obesity who presented to [address] [place] [address] with right-sided facial droop. Patient states that his last known well was 1 AM when he went to bed and had no abnormal symptoms the preceding night. When he woke up this morning around 9 AM his wife noted that he had slurred speech but did not think much of it, he went to [facility] and was told that his face was drooping on the left side, patient then realized his deficits and came to the ER for further evaluation. He denies any visual changes, diplopia, facial numbness or tingling, lower extremity or upper extremity numbness tingling or weakness, dizziness or lightheadedness, vertigo. No difficulty with swallowing or speaking. He has never had symptoms similar to this before. Denies any recent fevers chills or other illnesses. No recent major changes in medications, denies any alcohol or recreational drug use. In the ED, telestroke was contacted and recommended full dose aspirin and Plavix loading and admission for further evaluation and stroke work-up. 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 Vital signs reveal hypertension however otherwise within normal limits. Labs show mild hyponatremia, hyperglycemia, and macrocytosis. UA clean with >1000 glucose and elevated spec grav.        Past Medical History:          Diagnosis Date    Arthritis     neck and knees    Cervical pain 8/10/16, 8/24/16    pain injection @ [facility], Dr. Yeison Ryan History of kidney stones     Hypertension     DELMER on CPAP     Prostate cancer ([facility] [address]     Shingles 09/04/2016    Strain of quadriceps muscle     left    Thyroid disease        Past Surgical History:          Procedure Laterality Date    COLONOSCOPY  03/10/2017    Dr. Griselda Massa  03/20/2017    bladder neck incision    HERNIA REPAIR  [address]    umbilical--Dr. Coburn    LEG TENDON SURGERY  03/2008    left quad muscle repair    NECK SURGERY  09/2017    PROSTATE SURGERY  12/2008    Dr. Gisele Amaya    as child       Medications Prior to Admission:      Prior to Admission medications    Medication Sig Start Date End Date Taking? Authorizing Provider   pioglitazone (ACTOS) 15 MG tablet Take 15 mg by mouth daily    Historical Provider, MD   metFORMIN (GLUCOPHAGE) 500 MG tablet Take 500 mg by mouth 2 times daily (with meals)    Historical Provider, MD   sildenafil (REVATIO) 20 MG tablet Take 20 mg by mouth as needed    Historical Provider, MD   magnesium (MAGNESIUM-OXIDE) 250 MG TABS tablet Take 250 mg by mouth 2 times daily    Historical Provider, MD   Cholecalciferol (VITAMIN D3) 50 MCG (2000 UT) CAPS Take by mouth    Historical Provider, MD   fenofibrate (TRICOR) 145 MG tablet Take 145 mg by mouth daily s [facility] pharmacy: Please dispense generic fenofibrate unless prescriber denote     Historical Provider, MD   furosemide (LASIX) 20 MG tablet Take 20 mg by mouth daily as needed    Historical Provider, MD   Lysine HCl 500 MG CAPS Take 1 capsule by mouth daily    Historical Provider, MD   benazepril (LOTENSIN) 20 MG tablet Take 20 mg by mouth 2 times daily     Historical Provider, MD   levothyroxine (SYNTHROID) 25 MCG tablet Take 25 mcg by mouth Daily    Historical Provider, MD   Multiple Vitamins-Minerals (CENTRUM SILVER ADULT 50+ PO) Take by mouth    Historical Provider, MD   metoprolol (TOPROL-XL) 50 MG XL tablet Take 50 mg by mouth 2 times daily     Historical Provider, MD       Allergies:  Pcn [penicillins] and Kefzol [cefazolin sodium]    Social History:      The patient currently lives with wife    TOBACCO:   reports that he has never smoked.  He has never used smokeless tobacco.  ETOH:   reports current alcohol use. Family History:      Positive as follows:        Problem Relation Age of Onset    Dementia Father        Diet:  No diet orders on file    REVIEW OF SYSTEMS:   Pertinent positives as noted in the HPI. All other systems reviewed and negative. PHYSICAL EXAM:    BP (!) 142/64   Pulse 76   Temp 98.4 °F (36.9 °C) (Oral)   Resp 18   Ht 6' (1.829 m)   Wt 300 lb (136.1 kg)   SpO2 97%   BMI 40.69 kg/m²     General appearance: No apparent distress, well developed, appears stated age. Eyes:  Pupils equal, round, and reactive to light. Conjunctivae/corneas clear. HENT: Head normal in appearance. External nares normal.  Oral mucosa moist without lesions. Hearing grossly intact. Neck: Supple, with full range of motion. Trachea midline. No gross JVD appreciated. Respiratory:  Normal respiratory effort. Clear to auscultation, bilaterally without rales or wheezes or rhonchi. Cardiovascular: Normal rate, regular rhythm with normal S1/S2 without murmurs. No lower extremity edema. Abdomen: Soft, non-tender, non-distended with normal bowel sounds. Musculoskeletal: There is no joint swelling or tenderness. Normal tone. No abnormal movements. Skin: Warm and dry. No rashes or lesions. Neurologic:  No focal sensory/motor deficits in the upper and lower extremities. Cranial nerves:  grossly non-focal 2-12. Psychiatric: Alert and oriented, normal insight and though content. Capillary Refill: Brisk,< 3 seconds. Peripheral Pulses: +2 palpable, equal bilaterally. Labs:     Recent Labs     03/21/21  1417   WBC 6.5   HGB 15.1   HCT 44.6        Recent Labs     03/21/21  1417   *   K 4.6   CL 97*   CO2 28   BUN 26*   CREATININE 1.0   CALCIUM 9.9     Recent Labs     03/21/21  1417   AST 23   ALT 29   BILITOT 0.4   ALKPHOS 36*     No results for input(s): INR in the last 72 hours. No results for input(s): Lukas Spoon in the last 72 hours.     Urinalysis:      Lab Results Component Value Date    NITRU NEGATIVE 03/21/2021    BLOODU NEGATIVE 03/21/2021    SPECGRAV 1.025 03/03/2021    GLUCOSEU >= 1000 03/21/2021       Intake & Output:  No intake/output data recorded. No intake/output data recorded. Radiology:     EKG:  I have reviewed the EKG with the following interpretation: Sinus rhythm without ST or T wave changes, no evidence of ventricular enlargement or dysrhythmia. CTA HEAD W WO CONTRAST         CTA NECK W WO CONTRAST         CT HEAD WO CONTRAST (CODE STROKE)   Final Result   No acute intracranial hemorrhage, mass effect or midline shift. **This report has been created using voice recognition software. It may contain minor errors which are inherent in voice recognition technology. **      Final report electronically signed by Dr Marielena Garcia on 3/21/2021 2:12 PM                   IVF: yes  DVT prophylaxis: [x] Lovenox                                 [] SCDs                                 [] SQ Heparin                                 [] Encourage ambulation           [] Already on Anticoagulation      PT/OT Eval Status: Ordered    Disposition:    [x] Home       [] TCU       [] Rehab       [] Psych       [] SNF       [] [place]       [] Other-      Code Status: No Order    Thank you ADITYA Ríos for the opportunity to be involved in this patient's care.     Electronically signed by Kenyatta Hanks DO on 3/21/2021 at 5:13 PM

## 2021-03-21 NOTE — ED PROVIDER NOTES
[place] ENCOUNTER          Pt Name: Sayra Rabago  MRN: [id number]  [place] 1950  Date of evaluation: 3/21/2021  Treating Resident Physician: Madyson Martin MD  Supervising Physician: Elda Olson       Chief Complaint   Patient presents with    Facial Droop     left     History obtained from the patient. HISTORY OF PRESENT ILLNESS    HPI  Sayra Rabago is a 77-year-old male, past medical history of hypertension diabetes hyperlipidemia non-smoker, presenting with left-sided facial droop and slurred speech. Last known well was 1 AM. Unsure if facial droop occurred when he got up at 9 AM, was noticed by somebody at [facility] at 11am. Slurred speech noted at 9 AM.  Patient stated that he felt off, hope to that his condition would improve by eating lunch. after [facility] went to [facility]. no issues with ambulating, no issues with eating pancakes. Did not feel better after eating. Decided he may as well go to the hospital.  Patient states that somewhere during this time he did have some numbness over his left face but that has resolved prior to arrival.  Patient denies any other symptoms, dizziness, loss of consciousness, headache, nausea. The patient has no other acute complaints at this time. REVIEW OF SYSTEMS   Review of Systems   Constitutional: Positive for fatigue. Negative for activity change, appetite change, chills, diaphoresis and fever. HENT: Negative for facial swelling, postnasal drip, rhinorrhea, sinus pressure, sinus pain, sneezing, sore throat, trouble swallowing and voice change. Eyes: Negative for photophobia and visual disturbance. Respiratory: Negative for cough, choking, chest tightness, shortness of breath, wheezing and stridor. Cardiovascular: Negative for chest pain, palpitations and leg swelling.    Gastrointestinal: Negative for abdominal distention, abdominal pain, anal bleeding, blood in stool, constipation, diarrhea, nausea, rectal pain and vomiting. Endocrine: Negative for polyphagia and polyuria. Genitourinary: Negative for decreased urine volume, difficulty urinating, dysuria, enuresis, flank pain, frequency, hematuria and urgency. Musculoskeletal: Negative for arthralgias, back pain, gait problem, joint swelling, myalgias, neck pain and neck stiffness. Neurological: Positive for facial asymmetry, speech difficulty and numbness. Negative for dizziness, tremors, seizures, syncope, weakness, light-headedness and headaches.          PAST MEDICAL AND SURGICAL HISTORY     Past Medical History:   Diagnosis Date    Arthritis     neck and knees    Cervical pain 8/10/16, 8/24/16    pain injection @ [facility], Dr. Anthony Larios History of kidney stones     Hypertension     DELMER on CPAP     Prostate cancer ([facility] [address]     Shingles 09/04/2016    Strain of quadriceps muscle     left    Thyroid disease      Past Surgical History:   Procedure Laterality Date    COLONOSCOPY  03/10/2017    Dr. Spencer Sloan  03/20/2017    bladder neck incision    HERNIA REPAIR  0007    umbilical--Dr. Celi Ivey    LEG TENDON SURGERY  03/2008    left quad muscle repair    NECK SURGERY  09/2017    PROSTATE SURGERY  12/2008    Dr. Geena Salas    as child         MEDICATIONS     Current Facility-Administered Medications:     aspirin chewable tablet 324 mg, 324 mg, Oral, Once, Cash Purvis MD    0.9 % sodium chloride bolus, 1,000 mL, Intravenous, Once, Cash Purvis MD    clopidogrel (PLAVIX) tablet 300 mg, 300 mg, Oral, Once, Cash Purvis MD    0.9 % sodium chloride infusion, , Intravenous, Continuous, Cash Purvis MD    Current Outpatient Medications:     pioglitazone (ACTOS) 15 MG tablet, Take 15 mg by mouth daily, Disp: , Rfl:     metFORMIN (GLUCOPHAGE) 500 MG tablet, Take 500 mg by mouth 2 times daily (with meals), Disp: , Rfl:    sildenafil (REVATIO) 20 MG tablet, Take 20 mg by mouth as needed, Disp: , Rfl:     magnesium (MAGNESIUM-OXIDE) 250 MG TABS tablet, Take 250 mg by mouth 2 times daily, Disp: , Rfl:     Cholecalciferol (VITAMIN D3) 50 MCG (2000 UT) CAPS, Take by mouth, Disp: , Rfl:     fenofibrate (TRICOR) 145 MG tablet, Take 145 mg by mouth daily s [facility] pharmacy: Please dispense generic fenofibrate unless prescriber denote , Disp: , Rfl:     furosemide (LASIX) 20 MG tablet, Take 20 mg by mouth daily as needed, Disp: , Rfl:     Lysine HCl 500 MG CAPS, Take 1 capsule by mouth daily, Disp: , Rfl:     benazepril (LOTENSIN) 20 MG tablet, Take 20 mg by mouth 2 times daily , Disp: , Rfl:     levothyroxine (SYNTHROID) 25 MCG tablet, Take 25 mcg by mouth Daily, Disp: , Rfl:     Multiple Vitamins-Minerals (CENTRUM SILVER ADULT 50+ PO), Take by mouth, Disp: , Rfl:     metoprolol (TOPROL-XL) 50 MG XL tablet, Take 50 mg by mouth 2 times daily , Disp: , Rfl:       SOCIAL HISTORY     Social History     Social History Narrative    Not on file     Social History     Tobacco Use    Smoking status: Never Smoker    Smokeless tobacco: Never Used   Substance Use Topics    Alcohol use: Yes     Alcohol/week: 0.0 standard drinks     Comment: 1 beer a month    Drug use: No         ALLERGIES     Allergies   Allergen Reactions    Pcn [Penicillins]      Unsure of reaction as child    Kefzol [Cefazolin Sodium] Rash     Feels hot         FAMILY HISTORY     Family History   Problem Relation Age of Onset    Dementia Father          PREVIOUS RECORDS   Previous records reviewed: Medical, past surgical, medications, allergies. PHYSICAL EXAM     ED Triage Vitals [03/21/21 1411]   BP Temp Temp src Pulse Resp SpO2 Height Weight   -- -- -- 80 18 97 % 6' (1.829 m) 300 lb (136.1 kg)     Initial vital signs and nursing assessment reviewed and normal. Body mass index is 40.69 kg/m². Pulsoximetry is normal per my interpretation.     Additional Vital Signs:  Vitals:    03/21/21 1411   BP: (!) 169/83   Pulse: 80   Resp: 18   Temp: 98.4 °F (36.9 °C)   SpO2: 97%       Physical Exam  HENT:      Head: Atraumatic. Right Ear: External ear normal.      Left Ear: External ear normal.      Nose: Nose normal.      Mouth/Throat:      Mouth: Mucous membranes are moist.      Pharynx: Oropharynx is clear. Eyes:      Extraocular Movements: Extraocular movements intact. Conjunctiva/sclera: Conjunctivae normal.      Pupils: Pupils are equal, round, and reactive to light. Neck:      Musculoskeletal: Normal range of motion and neck supple. No muscular tenderness. Vascular: No carotid bruit. Cardiovascular:      Rate and Rhythm: Normal rate and regular rhythm. Pulses: Normal pulses. Heart sounds: Normal heart sounds. No murmur. No gallop. Pulmonary:      Effort: Pulmonary effort is normal. No respiratory distress. Breath sounds: Normal breath sounds. No stridor. No wheezing, rhonchi or rales. Chest:      Chest wall: No tenderness. Abdominal:      General: Abdomen is flat. Bowel sounds are normal. There is no distension. Palpations: Abdomen is soft. Tenderness: There is no abdominal tenderness. There is no guarding or rebound. Musculoskeletal: Normal range of motion. General: No swelling or tenderness. Right lower leg: No edema. Left lower leg: No edema. Skin:     General: Skin is warm and dry. Capillary Refill: Capillary refill takes less than 2 seconds. Coloration: Skin is not jaundiced. Findings: No bruising or lesion. Neurological:      Mental Status: He is alert. NIH Stroke Scale  Interval: Baseline  Level of Consciousness (1a. ): Alert  LOC Questions (1b. ):  Answers both correctly  LOC Commands (1c. ): Performs both tasks correctly  Best Gaze (2. ): Normal  Visual (3. ): No visual loss  Facial Palsy (4. ): (!) Minor paralysis  Motor Arm, Left (5a. ): No drift  Motor Arm, Right (5b. ): No drift  Motor Leg, Left (6a. ): No drift  Motor Leg, Right (6b. ): No drift  Limb Ataxia (7. ): Absent  Sensory (8. ): Normal  Best Language (9. ): No aphasia  Dysarthria (10. ): Mild to moderate dysarthria  Extinction and Inattention (11): No abnormality  Total: 2    MEDICAL DECISION MAKING   1. Initial Assessment and Plan: This is a 53-year-old male, past medical history of hypertension diabetes hyperlipidemia non-smoker, presenting with left-sided facial droop and slurred speech. Last known well was 1 AM.  Slurred speech noted at 9 AM.  Physical exam significant for flattening of the left nasolabial fold, and mild dysarthria. Patient was given Plavix, aspirin, fluids. CT head unremarkable, CTA showed right vertebral artery occlusion, but the basilar artery had good collaterals with left vertebral artery. Location of lesion does not correspond to patient's symptoms. Spoke with Dr. Pedro Perez telestroke, who recommended patient be admitted here for further stroke work-up, as well as MRI in the morning. ED RESULTS   Laboratory results:  Labs Reviewed   CBC WITH AUTO DIFFERENTIAL - Abnormal; Notable for the following components:       Result Value    RBC 4.50 (*)     MCV 99.1 (*)     MCH 33.6 (*)     RDW-CV 11.2 (*)     All other components within normal limits   POCT GLUCOSE - Abnormal; Notable for the following components:    POC Glucose 224 (*)     All other components within normal limits   POCT GLUCOSE - Abnormal; Notable for the following components:    POC Glucose 265 (*)     All other components within normal limits   COMPREHENSIVE METABOLIC PANEL W/ REFLEX TO MG FOR LOW K   TROPONIN   BRAIN NATRIURETIC PEPTIDE   URINE RT REFLEX TO CULTURE       Radiologic studies results:  CT HEAD WO CONTRAST (CODE STROKE)   Final Result   No acute intracranial hemorrhage, mass effect or midline shift. **This report has been created using voice recognition software.  It may contain minor errors which are inherent in voice recognition technology. **      Final report electronically signed by Dr Jan Shields on 3/21/2021 2:12 PM      CTA HEAD W [address]    (Results Pending)   CTA NECK W [address]    (Results Pending)       ED Medications administered this visit:   Medications   aspirin chewable tablet 324 mg (has no administration in time range)   0.9 % sodium chloride bolus (has no administration in time range)   clopidogrel (PLAVIX) tablet 300 mg (has no administration in time range)   0.9 % sodium chloride infusion (has no administration in time range)         ED COURSE        Strict return precautions and follow up instructions were discussed with the patient prior to discharge, with which the patient agrees. MEDICATION CHANGES     New Prescriptions    No medications on file         FINAL DISPOSITION     Final diagnoses:   Facial droop     Condition: condition: good  Dispo: Admit to med/surg floor      This transcription was electronically signed. Parts of this transcriptions may have been dictated by use of voice recognition software and electronically transcribed, and parts may have been transcribed with the assistance of an ED scribe. The transcription may contain errors not detected in proofreading. Please refer to my supervising physician's documentation if my documentation differs.     Electronically Signed: Marquis Munoz, 03/21/21, 2:39 PM         Marquis Munoz MD  Resident  03/21/21 7699

## 2021-03-21 NOTE — VIRTUAL HEALTH
[facility] AT [place] Stroke and Vascular Neurology Consult for  [facility] Stroke Alert through [address] @ 6 pm  3/21/2021 6:50 PM  Pt Name: Samia Jones  MRN: [id number]  YOB: 1950  Date of evaluation: 3/21/2021  Primary Care Physician: Sushma Alvarado  Reason for Evaluation: Stroke evaluation with Phone Consult, Discussion and Review of imaging    Samia Jones is a 78 y.o. male with past medical history of Arthritis, Cervical pain, History of kidney stones, Hypertension, DELMER on CPAP, Prostate cancer ([facility] [address] Shingles, Strain of quadriceps muscle, and Thyroid disease. He presented with left-sided facial droop and dysarthria. Last known well at 1 AM on 3/28/2021. He had slurred speech and left-sided facial droop noted this morning. He was outside the window for TPA. No reported weakness. Reported NIH stroke scale at 2. Allergies  is allergic to pcn [penicillins] and kefzol [cefazolin sodium]. Medications  Prior to Admission medications    Medication Sig Start Date End Date Taking?  Authorizing Provider   pioglitazone (ACTOS) 15 MG tablet Take 15 mg by mouth daily   Yes Historical Provider, MD   metFORMIN (GLUCOPHAGE) 500 MG tablet Take 1,000 mg by mouth 2 times daily (with meals)    Yes Historical Provider, MD   magnesium (MAGNESIUM-OXIDE) 250 MG TABS tablet Take 250 mg by mouth 2 times daily   Yes Historical Provider, MD   Cholecalciferol (VITAMIN D3) 50 MCG (2000 UT) CAPS Take by mouth   Yes Historical Provider, MD   fenofibrate (TRICOR) 145 MG tablet Take 145 mg by mouth daily [facility] pharmacy: Please dispense generic fenofibrate unless prescriber denote    Yes Historical Provider, MD   furosemide (LASIX) 20 MG tablet Take 20 mg by mouth three times a week Itdxgic-Zztlrhpx-Hueasmxs   Yes Historical Provider, MD   Lysine HCl 500 MG CAPS Take 1 capsule by mouth daily   Yes Historical Provider, MD   benazepril (LOTENSIN) 20 MG tablet Take 20 mg by mouth 2 times daily    Yes Historical Provider, MD   levothyroxine (SYNTHROID) 25 MCG tablet Take 25 mcg by mouth Daily   Yes Historical Provider, MD   Multiple Vitamins-Minerals (CENTRUM SILVER ADULT 50+ PO) Take by mouth   Yes Historical Provider, MD   metoprolol (TOPROL-XL) 50 MG XL tablet Take 50 mg by mouth 2 times daily    Yes Historical Provider, MD   sildenafil (REVATIO) 20 MG tablet Take 20 mg by mouth as needed    Historical Provider, MD    Scheduled Meds:   sodium chloride flush  10 mL Intravenous 2 times per day    enoxaparin  40 mg Subcutaneous Q24H    [START ON 3/22/2021] aspirin  81 mg Oral Daily    Or    [START ON 3/22/2021] aspirin  300 mg Rectal Daily    atorvastatin  80 mg Oral Nightly    insulin lispro  0-12 Units Subcutaneous Q4H     Continuous Infusions:   sodium chloride 100 mL/hr at 03/21/21 1559    sodium chloride 75 mL/hr at 03/21/21 1822    dextrose       PRN Meds:.sodium chloride flush, promethazine **OR** ondansetron, polyethylene glycol, glucose, dextrose, glucagon (rDNA), dextrose  Past Medical History   has a past medical history of Arthritis, Cervical pain, History of kidney stones, Hypertension, DELMER on CPAP, Prostate cancer ([facility] [address] Shingles, Strain of quadriceps muscle, and Thyroid disease. Social History  Social History     Socioeconomic History    Marital status:      Spouse name: Not on file    Number of children: Not on file    Years of education: Not on file    Highest education level: Not on file   Occupational History    Not on file   Social Needs    Financial resource strain: Not on file    Food insecurity     Worry: Not on file     Inability: Not on file    Transportation needs     Medical: Not on file     Non-medical: Not on file   Tobacco Use    Smoking status: Never Smoker    Smokeless tobacco: Never Used   Substance and Sexual Activity    Alcohol use:  Yes     Alcohol/week: 0.0 standard drinks     Comment: 1 beer a month    Drug use: No    Sexual activity: Yes Lifestyle    Physical activity     Days per week: Not on file     Minutes per session: Not on file    Stress: Not on file   Relationships    Social connections     Talks on phone: Not on file     Gets together: Not on file     Attends Christian service: Not on file     Active member of club or organization: Not on file     Attends meetings of clubs or organizations: Not on file     Relationship status: Not on file    Intimate partner violence     Fear of current or ex partner: Not on file     Emotionally abused: Not on file     Physically abused: Not on file     Forced sexual activity: Not on file   Other Topics Concern    Not on file   Social History Narrative    Not on file     Family History      Problem Relation Age of Onset    Dementia Father        OBJECTIVE  /77   Pulse 70   Temp 97.9 °F (36.6 °C) (Oral)   Resp 18   Ht 6' (1.829 m)   Wt 300 lb (136.1 kg)   SpO2 98%   BMI 40.69 kg/m²     NIH Stroke Scale  Interval: Reassessment  Level of Consciousness (1a. ): Alert  LOC Questions (1b. ): Answers both correctly  LOC Commands (1c. ): Performs both tasks correctly  Best Gaze (2. ): Normal  Visual (3. ): No visual loss  Facial Palsy (4. ): (!) Minor paralysis  Motor Arm, Left (5a. ): No drift  Motor Arm, Right (5b. ): No drift  Motor Leg, Left (6a. ): No drift  Motor Leg, Right (6b. ): No drift  Limb Ataxia (7. ): Absent  Sensory (8. ): Normal  Best Language (9. ): No aphasia  Dysarthria (10. ): Normal  Extinction and Inattention (11): No abnormality  Total: 2      Imaging:  Images were personally reviewed with PACS used to review images including:  CT brain without contrast: No acute bleeding noted. CTA imaging: No large vessel occlusion. He has bilateral P-comm. Diminished posterior circulation. Left vertebral artery is dominant. Right vertebral artery is hypoplastic with small vertebral foramina. Assessment    Differential DDx:  1. Acute ischemic stroke. Recommendations:  1.  NIH 2  2. Recommend Inpatient Neurology Consult for further assessment and evaluation   3. Obtain MRI brain without contrast.  4. Loaded with aspirin 325 and Plavix 300.  5. Neurology consult. 6. IV hydration. 7. Allow permissive hypertension up to 220/120. Discussed with ED Physician    This is a Phone Consult, I have not seen the patient face to face, the telemedicine device was not utilized.     Marita Meeks MD   Stroke, Neurocritical Care And/or [address] [place] [address] Stroke [address]  Electronically signed 3/21/2021 at 6:50 PM

## 2021-03-21 NOTE — ED NOTES
Patient returned to ED room 5 from CT scan in stable condition with this RN.        Vidal Quinn, CARRIE  03/21/21 6942

## 2021-03-21 NOTE — ED NOTES
ED to inpatient nurses report    Chief Complaint   Patient presents with    Facial Droop     left      Present to ED from home  LOC: alert and orientated to name, place, date  Vital signs   Vitals:    03/21/21 1411 03/21/21 1509 03/21/21 1559 03/21/21 1727   BP: (!) 169/83 (!) 162/68 (!) 142/64 (!) 159/71   Pulse: 80 76 76 71   Resp: 18 18 18 18   Temp: 98.4 °F (36.9 °C)      TempSrc: Oral      SpO2: 97% 97% 97% 97%   Weight: 300 lb (136.1 kg)      Height: 6' (1.829 m)         Oxygen Baseline roomair    Current needs required none at this time  Bipap/Cpap No  LDAs:   Peripheral IV 03/21/21 Right Antecubital (Active)   Site Assessment Clean;Dry; Intact 03/21/21 1508   Line Status Normal saline locked; Infusing 03/21/21 1508   Dressing Status Clean;Dry; Intact 03/21/21 1508   Dressing Intervention New 03/21/21 1417     Mobility: Requires assistance * 1  Pending ED orders: none  Present condition: stable      Electronically signed by Flower Arrington RN on 3/21/2021 at 5:29 PM       Flower Arrington RN  03/21/21 0711

## 2021-03-22 ENCOUNTER — APPOINTMENT (OUTPATIENT)
Dept: MRI IMAGING | Age: 71
End: 2021-03-22
Payer: MEDICARE

## 2021-03-22 VITALS
HEART RATE: 67 BPM | RESPIRATION RATE: 20 BRPM | SYSTOLIC BLOOD PRESSURE: 186 MMHG | HEIGHT: 72 IN | BODY MASS INDEX: 40.4 KG/M2 | TEMPERATURE: 97.7 F | DIASTOLIC BLOOD PRESSURE: 84 MMHG | OXYGEN SATURATION: 97 % | WEIGHT: 298.3 LBS

## 2021-03-22 LAB
ANION GAP SERPL CALCULATED.3IONS-SCNC: 10 MEQ/L (ref 8–16)
AVERAGE GLUCOSE: 156 MG/DL (ref 70–126)
BUN BLDV-MCNC: 18 MG/DL (ref 7–22)
CALCIUM SERPL-MCNC: 8.7 MG/DL (ref 8.5–10.5)
CHLORIDE BLD-SCNC: 105 MEQ/L (ref 98–111)
CHOLESTEROL, TOTAL: 145 MG/DL (ref 100–199)
CO2: 23 MEQ/L (ref 23–33)
CREAT SERPL-MCNC: 0.8 MG/DL (ref 0.4–1.2)
ERYTHROCYTE [DISTWIDTH] IN BLOOD BY AUTOMATED COUNT: 11.1 % (ref 11.5–14.5)
ERYTHROCYTE [DISTWIDTH] IN BLOOD BY AUTOMATED COUNT: 40.4 FL (ref 35–45)
GFR SERPL CREATININE-BSD FRML MDRD: > 90 ML/MIN/1.73M2
GLUCOSE BLD-MCNC: 137 MG/DL (ref 70–108)
GLUCOSE BLD-MCNC: 144 MG/DL (ref 70–108)
GLUCOSE BLD-MCNC: 149 MG/DL (ref 70–108)
GLUCOSE BLD-MCNC: 162 MG/DL (ref 70–108)
GLUCOSE BLD-MCNC: 187 MG/DL (ref 70–108)
HBA1C MFR BLD: 7.2 % (ref 4.4–6.4)
HCT VFR BLD CALC: 39.9 % (ref 42–52)
HDLC SERPL-MCNC: 27 MG/DL
HEMOGLOBIN: 13.8 GM/DL (ref 14–18)
LDL CHOLESTEROL CALCULATED: 58 MG/DL
MCH RBC QN AUTO: 34.3 PG (ref 26–33)
MCHC RBC AUTO-ENTMCNC: 34.6 GM/DL (ref 32.2–35.5)
MCV RBC AUTO: 99.3 FL (ref 80–94)
PLATELET # BLD: 163 THOU/MM3 (ref 130–400)
PMV BLD AUTO: 10 FL (ref 9.4–12.4)
POTASSIUM SERPL-SCNC: 4 MEQ/L (ref 3.5–5.2)
RBC # BLD: 4.02 MILL/MM3 (ref 4.7–6.1)
SODIUM BLD-SCNC: 138 MEQ/L (ref 135–145)
TRIGL SERPL-MCNC: 301 MG/DL (ref 0–199)
WBC # BLD: 5.3 THOU/MM3 (ref 4.8–10.8)

## 2021-03-22 PROCEDURE — 70551 MRI BRAIN STEM W/O DYE: CPT

## 2021-03-22 PROCEDURE — 92610 EVALUATE SWALLOWING FUNCTION: CPT

## 2021-03-22 PROCEDURE — 80048 BASIC METABOLIC PNL TOTAL CA: CPT

## 2021-03-22 PROCEDURE — 97535 SELF CARE MNGMENT TRAINING: CPT

## 2021-03-22 PROCEDURE — G0378 HOSPITAL OBSERVATION PER HR: HCPCS

## 2021-03-22 PROCEDURE — 99217 PR OBSERVATION CARE DISCHARGE MANAGEMENT: CPT | Performed by: PHYSICIAN ASSISTANT

## 2021-03-22 PROCEDURE — 96372 THER/PROPH/DIAG INJ SC/IM: CPT

## 2021-03-22 PROCEDURE — 97530 THERAPEUTIC ACTIVITIES: CPT

## 2021-03-22 PROCEDURE — 80061 LIPID PANEL: CPT

## 2021-03-22 PROCEDURE — 6370000000 HC RX 637 (ALT 250 FOR IP): Performed by: INTERNAL MEDICINE

## 2021-03-22 PROCEDURE — 99219 PR INITIAL OBSERVATION CARE/DAY 50 MINUTES: CPT | Performed by: PSYCHIATRY & NEUROLOGY

## 2021-03-22 PROCEDURE — 85027 COMPLETE CBC AUTOMATED: CPT

## 2021-03-22 PROCEDURE — 96361 HYDRATE IV INFUSION ADD-ON: CPT

## 2021-03-22 PROCEDURE — 97161 PT EVAL LOW COMPLEX 20 MIN: CPT

## 2021-03-22 PROCEDURE — 83036 HEMOGLOBIN GLYCOSYLATED A1C: CPT

## 2021-03-22 PROCEDURE — 2580000003 HC RX 258: Performed by: INTERNAL MEDICINE

## 2021-03-22 PROCEDURE — 36415 COLL VENOUS BLD VENIPUNCTURE: CPT

## 2021-03-22 PROCEDURE — 82948 REAGENT STRIP/BLOOD GLUCOSE: CPT

## 2021-03-22 PROCEDURE — 97165 OT EVAL LOW COMPLEX 30 MIN: CPT

## 2021-03-22 RX ORDER — METOPROLOL SUCCINATE 50 MG/1
25 TABLET, EXTENDED RELEASE ORAL 2 TIMES DAILY
Qty: 30 TABLET | Refills: 3 | Status: SHIPPED | OUTPATIENT
Start: 2021-03-22

## 2021-03-22 RX ORDER — ASPIRIN 81 MG/1
81 TABLET ORAL DAILY
Qty: 30 TABLET | Refills: 3 | Status: SHIPPED | OUTPATIENT
Start: 2021-03-23

## 2021-03-22 RX ORDER — ATORVASTATIN CALCIUM 80 MG/1
80 TABLET, FILM COATED ORAL NIGHTLY
Qty: 30 TABLET | Refills: 3 | Status: SHIPPED | OUTPATIENT
Start: 2021-03-22

## 2021-03-22 RX ADMIN — ASPIRIN 81 MG: 81 TABLET, COATED ORAL at 08:08

## 2021-03-22 RX ADMIN — INSULIN LISPRO 2 UNITS: 100 INJECTION, SOLUTION INTRAVENOUS; SUBCUTANEOUS at 06:36

## 2021-03-22 RX ADMIN — SODIUM CHLORIDE: 9 INJECTION, SOLUTION INTRAVENOUS at 11:20

## 2021-03-22 NOTE — PROGRESS NOTES
[facility] NEUROSCIENCES 4A  Clinical Swallow Evaluation      SLP Individual Minutes  Time In: 1400  Time Out: 0453  Minutes: 8  Timed Code Treatment Minutes: 0 Minutes       Date: 3/22/2021  Patient Name: Ellis Briggs      CSN: [id number]   : 1950  (78 y.o.)  Gender: male   Referring Physician:  Doc Boland DO  Diagnosis: TIA  Secondary Diagnosis: Dysphagia    History of Present Illness/Injury: Pt admitted to [facility] with above med dx; please refer to physician H&P for full details. Per chart review, 78 y.o. male with PMHx of hypertension, DELMER, hypothyroidism, prostate disease, type 2 diabetes, lifetime non-smoker, obesity who presented to [address] [place] [address] with right-sided facial droop. Patient states that his last known well was 1 AM when he went to bed and had no abnormal symptoms the preceding night. When he woke up this morning around 9 AM his wife noted that he had slurred speech but did not think much of it, he went to [employer] and was told that his face was drooping on the left side, patient then realized his deficits and came to the ER for further evaluation. He denies any visual changes, diplopia, facial numbness or tingling, lower extremity or upper extremity numbness tingling or weakness, dizziness or lightheadedness, vertigo. No difficulty with swallowing or speaking. He has never had symptoms similar to this before. MRI negative for acute intracranial findings. ST consulted to complete clinical swallow evaluation to ascertain need for potential dysphagia management s/t admitting dx.    Past Medical History:   Diagnosis Date    Arthritis     neck and knees    Cervical pain 8/10/16, 16    pain injection @ [facility], Dr. Lamonte Asher History of kidney stones     Hypertension     DELMER on CPAP     Prostate cancer ([facility] [address]     Shingles 2016    Strain of quadriceps muscle     left    Thyroid disease        SUBJECTIVE: Pt resting in bed upon arrival, alert and pleasant, spouse at bedside. Active consumption of lunch meal with pt and spouse denying concerns r/t swallow function, reporting a regular diet with thin liquids in home environment. OBJECTIVE:    Pain:  No pain reported. Current Diet: Regular, thin liquids    Respiratory Status:  Independent    Behavioral Observation:  Alert and Oriented    Oral Mechanism Evaluation:      Facial / Labial WFL    Lingual WFL    Dentition WFL    Velum WFL    Vocal Quality WFL    Sensation WFL    Cough Not Tested      Patient Evaluated Using:  Lunch meal tray - meatloaf, mashed potatoes+gravy, thin coffee via cup    Oral Phase:  WFL    Pharyngeal Phase: WFL:  Pharyngeal phase appears WFL but cannot rule out pharyngeal phase deficits from a bedside swallowing evaluation alone. Signs and Symptoms of Laryngeal Penetration/Aspiration: No signs/symptoms of aspiration evident in this evaluation, but cannot rule out silent aspiration. Impresssions: Pt presents with swallow function that is suspected to be essentially [facility] based on findings outlined above. All labial/lingual/pharyngeal structures intact and appear to be functioning appropriately at bedside. Oral phase highly unremarkable during consumption of hard/textured solids with pt demonstrating adequate mastication pattern for textural breakdown, cohesive bolus formation, and manipulation. Thin liquids consumed without overt difficulty and with suspected control/containment of fluid bolus. NO overt s/s aspiration exhibited across all consistencies/trials consumed, certainly not able to exclude pharyngeal phase dysfunction and airway invasion events in its entirety at bedside alone. Pt's swallow physiology does appear appropriate to support PO intake without distress. Recommend continuation of regular diet with thin liquids.  No further ST services are warranted at this time r/t dysphagia management given baseline status of swallow function achieved; please re-consult should further needs be identified. RECOMMENDATIONS/ASSESSMENT:  Instrumental Evaluation: Instrumental evaluation not indicated at this time. Diet Recommendations:  Regular, thin liquids  Strategies:  Full Upright Position, Small Bite/Sip, Pulmonary Monitoring and Alternate Solids and Liquids   Rehabilitation Potential: excellent    EDUCATION:  Learner: Patient and Significant Other  Education:  Reviewed results and recommendations of this evaluation, Reviewed diet and strategies, Reviewed signs, symptoms and risks of aspiration, Reviewed ST goals and Plan of Care and Reviewed recommendations for follow-up  Evaluation of Education: Verbalizes understanding and Demonstrates without assistance    PLAN:  No further speech therapy services indicated.     PATIENT GOAL:    \"Go home\"        Robert Anderson M.A., [facility]

## 2021-03-22 NOTE — PROGRESS NOTES
Rex Bishop 62  INPATIENT OCCUPATIONAL THERAPY  [facility] NEUROSCIENCES 4A  EVALUATION    Time:    Time In: 0840  Time Out: 0920  Timed Code Treatment Minutes: 23 Minutes  Minutes: 40          Date: 3/22/2021  Patient Name: Emile Marin,   Gender: male      MRN: [id number]  : 1950  (78 y.o.)  Referring Practitioner: Dr. Angela Bobby  Diagnosis: TIA  Additional Pertinent Hx: 78 y.o. male with PMHx of hypertension, DELMER, hypothyroidism, prostate disease, type 2 diabetes, lifetime non-smoker, obesity who presented to [address] [place] [address] with right-sided facial droop. Patient states that his last known well was 1 AM when he went to bed and had no abnormal symptoms the preceding night. When he woke up this morning around 9 AM his wife noted that he had slurred speech but did not think much of it, he went to [facility] and was told that his face was drooping on the left side, patient then realized his deficits and came to the ER for further evaluation. He denies any visual changes, diplopia, facial numbness or tingling, lower extremity or upper extremity numbness tingling or weakness, dizziness or lightheadedness, vertigo. the ED, telestroke was contacted and recommended full dose aspirin and Plavix loading and admission for further evaluation and stroke work-up. 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 MRI of brain negative for any acute processess    Restrictions/Precautions:  Restrictions/Precautions: Fall Risk, General Precautions    Subjective  Chart Reviewed: Yes, Orders, Progress Notes, History and Physical, Imaging  Patient assessed for rehabilitation services?: Yes    Subjective: Pt very pleasant and cooperative. Pt stating he feels better today.     Pain:  Pain Assessment  Patient Currently in Pain: Denies gathering information related to past medical, social and functional history, completion of standardized testing, formal and informal observation of tasks, assessment of data and development of plan of care and goals. Treatment time included skilled education and facilitation of tasks to increase safety and independence with ADL's for improved functional independence and quality of life. Discharge Recommendations:  Home with assist PRN    Patient Education:  OT Education: OT Role, Plan of Care    Equipment Recommendations:  Equipment Needed: No    Plan:  Times per week: eval only  Current Treatment Recommendations: Self-Care / ADL, Balance Training. See long-term goal time frame for expected duration of plan of care. If no long-term goals established, a short length of stay is anticipated. Goals:  Patient goals : go home  Short term goals  Time Frame for Short term goals: by discharge  Short term goal 1: Pt to complete ADL tasks with S and no vcs for safety - MET  Short term goal 2: Pt to dmeo dynamic standing balalnce with S and no LOB while reaching outside base of support with bilateral UEs in prep for ADL tasks - MET         Following session, patient left in safe position with all fall risk precautions in place.

## 2021-03-22 NOTE — PLAN OF CARE
Problem: Falls - Risk of:  Goal: Will remain free from falls  Description: Will remain free from falls  Outcome: Ongoing  Note: Patient remained free from falls this shift. Bed is in low position with alarm on and siderails up x2. Education given on use of call light and patient voiced understanding. Call light and beside table within reach. Arm band and falling star in place. Hourly rounds completed. Will continue to monitor. Problem: Skin Integrity:  Goal: Will show no infection signs and symptoms  Description: Will show no infection signs and symptoms  Outcome: Ongoing  Note: No signs of skin breakdown. Skin warm, dry, and intact. Mucous membranes pink and moist.  Assistance with turns/ambulation provided PRN. Will continue to monitor. Problem: Discharge Planning:  Goal: Discharged to appropriate level of care  Description: Discharged to appropriate level of care  Outcome: Ongoing  Note: Discharge planning in process and discussed with patient/family. Social work consulted for any additional needs. Care manager aware of discharge needs. Pt is from home with wife. Problem: Pain:  Goal: Pain level will decrease  Description: Pain level will decrease  Outcome: Ongoing  Note: Patient able to use 0-10 pain scale. Denies pain at this time. Agreeable to take PRN pain medications. Problem: HEMODYNAMIC STATUS  Goal: Patient has stable vital signs and fluid balance  Outcome: Ongoing  Note: BP (!) 151/75   Pulse 67   Temp 98.2 °F (36.8 °C) (Oral)   Resp 18   Ht 6' (1.829 m)   Wt 300 lb (136.1 kg)   SpO2 97%   BMI 40.69 kg/m²     VS stable. Will continue to monitor. Care plan reviewed with patient. Patient verbalizes understanding of the plan of care and contributed to goal setting.
understanding of the plan of care and contributed to goal setting.

## 2021-03-22 NOTE — CARE COORDINATION
3/22/21, 11:38 AM EDT  DISCHARGE PLANNING EVALUATION:    Paulette Reyes       Admitted: 3/21/2021/ [facility] day: 0   Location: -04/004-A Reason for admit: TIA (transient ischemic attack) [G45.9]   PMH:  has a past medical history of Arthritis, Cervical pain, History of kidney stones, Hypertension, DELMER on CPAP, Prostate cancer ([address] Shingles, Strain of quadriceps muscle, and Thyroid disease. Procedure: CTA Head Neck W WO Contrast    Impression:           1. No flow-limiting stenosis or aneurysm in the intracranial circulation. 2. Mild mural plaque at the right carotid bulb without hemodynamically significant stenosis by NASCET criteria. 3. Dominant left vertebral artery with right vertebral artery terminating in the right posterior inferior cerebral artery which likely represents normal anatomic variation. MRI Brain WO Contrast   Impression:            1. No evidence of acute intracranial abnormality. 2. Mild mucosal inflammation of the paranasal sinuses. Barriers to Discharge:  Telemetry, PT/OT/ST, Neurology consult, diabetes management, IV fluids, Lovenox, ASA, Plavix. PCP: Mariela SPENCER   %    Patient Goals/Plan/Treatment Preferences: Met with Ashok. He currently lives at home with his wife. Plan is to return home at discharge. He denies need for DME and declines HH. Will follow. Transportation/Food Security/Housekeeping Addressed:  No issues identified.

## 2021-03-22 NOTE — PROGRESS NOTES
[address] [place] [address]  INPATIENT PHYSICAL THERAPY  EVALUATION  [facility] 4A - 4A-04/004-A    Time In: 3778  Time Out: 1433  Timed Code Treatment Minutes: 0 Minutes  Minutes: 18          Date: 3/22/2021  Patient Name: David Mabry,  Gender:  male        MRN: [id number]  : 1950  (78 y.o.)      Referring Practitioner: Sherrie Shea DO  Diagnosis: TIA (transient ischemic attack)  Additional Pertinent Hx: Per HPI: \"76 y.o. male with PMHx of hypertension, DELMER, hypothyroidism, prostate disease, type 2 diabetes, lifetime non-smoker, obesity who presented to [address] [place] [address] with right-sided facial droop. Patient states that his last known well was 1 AM when he went to bed and had no abnormal symptoms the preceding night. When he woke up this morning around 9 AM his wife noted that he had slurred speech but did not think much of it, he went to [facility] and was told that his face was drooping on the left side, patient then realized his deficits and came to the ER for further evaluation. He denies any visual changes, diplopia, facial numbness or tingling, lower extremity or upper extremity numbness tingling or weakness, dizziness or lightheadedness, vertigo. the ED, telestroke was contacted and recommended full dose aspirin and Plavix loading and admission for further evaluation and stroke work-up. 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  MRI of brain negative for any acute processess\"     Restrictions/Precautions:  Restrictions/Precautions: Fall Risk, General Precautions    Subjective:  Chart Reviewed: Yes  Patient assessed for rehabilitation services?: Yes  Family / Caregiver Present: No  Subjective: Pt pleasant and agreeable to therapy, finishing lunch upon PT arrival. He states he can tell he has not been pattern with supervision. Functional Outcome Measures: Completed  Rodrigues Balance Score: 55/56, indicating no fall risk  AM-PAC Inpatient Mobility Raw Score : 21  AM-PAC Inpatient T-Scale Score : 50.25     ASSESSMENT:  Activity Tolerance:  Patient tolerance of  treatment: good. Treatment Initiated: No treatment initiated    Assessment:  Assessment: Pt is s/p TIA, with resolution of symptoms. He did well with all activities during PT evaluation, and at this time, does not have a need for physical therapy services. He is safe to return home. Prognosis: Excellent    REQUIRES PT FOLLOW UP: No    Discharge Recommendations:  Discharge Recommendations: Home independently    Patient Education:  PT Education: Goals, PT Role, Plan of Care, General Safety, Gait Training    Equipment Recommendations:  Equipment Needed: No    Plan:  Times per week: N/A    Goals:  Patient goals : return home  Short term goals  Time Frame for Short term goals: N/A as pt is at [facility]  Long term goals  Time Frame for Long term goals : N/A as pt is at [facility]    Following session, patient left in safe position with all fall risk precautions in place.     Hari Damon, PT, DPT

## 2021-03-22 NOTE — CONSULTS
NEUROLOGY CONSULT NOTE      Requesting Physician: [employer], [employer]    Reason for Consult:  Evaluate for CVA    History of Present Illness:  Emile Marin is a 78 y.o. male admitted to [address] [place] [address] on 3/21/2021. Patient's wife noted slurred speech and patient noted a very slight L facial droop. He also complained of L eye swelling. He was LKW 1am the day prior to admission. He has no baseline neurological problems and is independent with all ADLs. He denies any changes in vision, movement, or numbness. He was seen by a Neurology resident via phone call who recommended a ASA and Plavix load. CTH was negative for any acute intracranial process. CTA with a possible occlusion of the R verte with full flow in the basilar by L verte. MRI Brain this morning was negative for any DWI changes or other acute intracranial process. His PMH includes: HTN, DELMER, HypoThyroidism, DM, and prostate cancer. He is also obese and states he wants to exercise but becomes tired after taking all his morning medications. He is very sensitive to BP medications. Reports no chest pain. No shortness of breath with exertion. Reports no neck pain. No vision changes. No dysphagia. No fever. No rash. No weight loss.     Past Medical History:        Diagnosis Date    Arthritis     neck and knees    Cervical pain 8/10/16, 8/24/16    pain injection @ [facility], Dr. Alicia Becker History of kidney stones     Hypertension     DELMER on CPAP     [facility] [facility] [address] [address]     Shingles 09/04/2016    Strain of quadriceps muscle     left    Thyroid disease            Procedure Laterality Date    COLONOSCOPY  03/10/2017    Dr. Leoncio Goncalves  03/20/2017    bladder neck incision    HERNIA REPAIR  0460    umbilical--Dr. Jean Diop    LEG TENDON SURGERY  03/2008    left quad muscle repair    NECK SURGERY  09/2017    PROSTATE SURGERY  12/2008    Dr. Leonel Denton    as child       Allergies: CALCIUM 9.9 8.7     Liver:   Recent Labs     03/21/21  1417   AST 23   ALT 29   ALKPHOS 36*   PROT 7.3   LABALBU 4.4   BILITOT 0.4     Lipids:   Recent Labs     03/22/21  0334   CHOL 145   TRIG 301*   HDL 27     A1C:   Recent Labs     03/22/21  0334   LABA1C 7.2*         MRI BRAIN:  No results found for this or any previous visit. No results found for this or any previous visit. Results for orders placed during the hospital encounter of 03/21/21   CTA HEAD W WO CONTRAST    Narrative WET READ:    Extremely diminutive appearance of the right vertebral artery. 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 The basilar artery appears to be solely supplied by the left vertebral artery. The arteries in the region of the [place] appear patent. Case was discussed by Dr. Stacey Mora with Dr. Kit Read at approximately 3:32 PM 3/21/2021 via telephone. This examination will be formally read by one of the neuro radiologists tomorrow. Please see that report. FINAL REPORT:    PROCEDURE: CTA HEAD W WO CONTRAST, CTA NECK W WO CONTRAST    CLINICAL INFORMATION: stroke. Sudden onset left facial droop. COMPARISON: CT head from the same date. TECHNIQUE: Helical CT from the aortic arch through the head in arterial phase during fast bolus administration of 80 mL Isovue-370 injected in the right [facility] [place] with multiplanar reconstructions to include volumetric maximum intensity projection sequences. All CT scans at this facility use dose modulation, iterative reconstruction, and/or weight-based dosing when appropriate to reduce radiation dose to as low as reasonably achievable. FINDINGS:   CTA head: There are mural calcifications in the cavernous segments of internal carotid arteries with areas of mild stenosis. There is no aneurysm in the intracranial segments of the internal carotid arteries.  The bilateral middle cerebral and anterior cerebral   arteries are patent without focal abnormality identified. The basilar artery is diminutive without focal stenosis. There are patent bilateral posterior communicating arteries which contribute to the posterior cerebral artery flow which appear patent   without focal stenosis. Dural venous sinuses appear patent without focal filling defect. No focal areas of abnormal parenchymal enhancement are identified. CTA NECK:  There is a typical 3 vessel arch. The brachiocephalic and left subclavian arteries are patent without flow-limiting stenosis. The common carotid arteries are patent without focal stenosis. There is small focal area of mural calcification at the right   carotid bulb without hemodynamically significant stenosis. The right carotid bulb is widely patent. Cervical segments of internal carotid arteries are patent without focal stenosis. There is medialization of the left internal carotid artery with partial   retropharyngeal course. The left vertebral artery is dominant. Vertebral arteries are patent throughout their course without focal stenosis. The right vertebral artery terminates in the right posterior inferior cerebellar artery. The basilar artery   supplied by the left vertebral artery. There is streak artifact from dental amalgam which partially obscures oral and perioral soft tissues. There is mild mucosal asymmetry at the left hypopharynx from retropharyngeal course of the left internal carotid artery. Given these caveats, mucosal   surfaces of the aerodigestive tract are symmetric without focal nodular thickening or visualized mass. There are few scattered bilateral cervical chain lymph nodes. No definite cervical lymphadenopathy is identified. Parotid, submandibular and thyroid   glands are unremarkable. Visualized portions of the lungs are clear. There is ACDF bridging C5-C7. There is no evidence of hardware failure. No suspicious osseous lesion is identified. Impression    1.  No flow-limiting stenosis or aneurysm in the intracranial circulation. 2. Mild mural plaque at the right carotid bulb without hemodynamically significant stenosis by NASCET criteria. 3. Dominant left vertebral artery with right vertebral artery terminating in the right posterior inferior cerebral artery which likely represents normal anatomic variation. **This report has been created using voice recognition software. It may contain minor errors which are inherent in voice recognition technology. **    Final report electronically signed by Dr. Vivian Rodriguez MD on 3/22/2021 8:52 AM     Results for orders placed during the hospital encounter of 03/21/21   CTA NECK W WO CONTRAST    Narrative WET READ:    Extremely diminutive appearance of the right vertebral artery. 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 The basilar artery appears to be solely supplied by the left vertebral artery. The arteries in the region of the [place] appear patent. Case was discussed by Dr. Catarina Lynn with Dr. Mitzi Pierson at approximately 3:32 PM 3/21/2021 via telephone. This examination will be formally read by one of the neuro radiologists tomorrow. Please see that report. FINAL REPORT:    PROCEDURE: CTA HEAD W WO CONTRAST, CTA NECK W WO CONTRAST    CLINICAL INFORMATION: stroke. Sudden onset left facial droop. COMPARISON: CT head from the same date. TECHNIQUE: Helical CT from the aortic arch through the head in arterial phase during fast bolus administration of 80 mL Isovue-370 injected in the right [facility] with multiplanar reconstructions to include volumetric maximum intensity projection sequences. All CT scans at this facility use dose modulation, iterative reconstruction, and/or weight-based dosing when appropriate to reduce radiation dose to as low as reasonably achievable. FINDINGS:   CTA head:   There are mural calcifications in the cavernous segments of internal carotid arteries with areas of mild stenosis. There is no aneurysm in the intracranial segments of the internal carotid arteries. The bilateral middle cerebral and anterior cerebral   arteries are patent without focal abnormality identified. The basilar artery is diminutive without focal stenosis. There are patent bilateral posterior communicating arteries which contribute to the posterior cerebral artery flow which appear patent   without focal stenosis. Dural venous sinuses appear patent without focal filling defect. No focal areas of abnormal parenchymal enhancement are identified. CTA NECK:  There is a typical 3 vessel arch. The brachiocephalic and left subclavian arteries are patent without flow-limiting stenosis. The common carotid arteries are patent without focal stenosis. There is small focal area of mural calcification at the right   carotid bulb without hemodynamically significant stenosis. The right carotid bulb is widely patent. Cervical segments of internal carotid arteries are patent without focal stenosis. There is medialization of the left internal carotid artery with partial   retropharyngeal course. The left vertebral artery is dominant. Vertebral arteries are patent throughout their course without focal stenosis. The right vertebral artery terminates in the right posterior inferior cerebellar artery. The basilar artery   supplied by the left vertebral artery. There is streak artifact from dental amalgam which partially obscures oral and perioral soft tissues. There is mild mucosal asymmetry at the left hypopharynx from retropharyngeal course of the left internal carotid artery. Given these caveats, mucosal   surfaces of the aerodigestive tract are symmetric without focal nodular thickening or visualized mass. There are few scattered bilateral cervical chain lymph nodes. No definite cervical lymphadenopathy is identified. Parotid, submandibular and thyroid   glands are unremarkable.  Visualized portions of the lungs are clear. There is ACDF bridging C5-C7. There is no evidence of hardware failure. No suspicious osseous lesion is identified. Impression    1. No flow-limiting stenosis or aneurysm in the intracranial circulation. 2. Mild mural plaque at the right carotid bulb without hemodynamically significant stenosis by NASCET criteria. 3. Dominant left vertebral artery with right vertebral artery terminating in the right posterior inferior cerebral artery which likely represents normal anatomic variation. **This report has been created using voice recognition software. It may contain minor errors which are inherent in voice recognition technology. **    Final report electronically signed by Dr. Meme Salmon MD on 3/22/2021 8:52 AM     Results for orders placed during the hospital encounter of 03/21/21   MRI brain without contrast    Narrative PROCEDURE: MRI BRAIN WO CONTRAST    INDICATION:  Left facial droop, numbness. COMPARISON: CT head dated 3/21/2021. TECHNIQUE: Multiplanar and multiple spin echo MRI images were obtained of the brain without contrast.    FINDINGS:  There is moderate parietal predominant volume loss. No significant focal areas of abnormal T2/flair prolongation are identified within the parenchyma. No intra or extra-axial mass is identified. No focal areas of restricted diffusion are present. The major vascular flow voids appear patent. Orbits are unremarkable. There is mild mucosal thickening in the ethmoid air cells and maxillary sinuses with bilateral mucous retention cysts. Mastoid air cells are clear. Impression    1. No evidence of acute intracranial abnormality. 2. Mild mucosal inflammation of the paranasal sinuses. **This report has been created using voice recognition software. It may contain minor errors which are inherent in voice recognition technology. **    Final report electronically signed by Dr. Meme Salmon MD on triglycerides, he just started Fenofibrate therapy by his PCP. NIHSS today 1 for L NLF. Recommendations:                                              1. Continue ASA 81mg on discharge  2. ECHO pending today- OK to get as OP. Patient sees a private cardiologist  3. Continue statin therapy and fenofibrate  4. Appreciate PT/OT recs  5. Needs follow up with neurology in 4 weeks  6. Discussed with primary service. 7. Please call if any questions. Zeyad Maya MD, MA  Board Certified Neurohospitalist      Time spent was at least 60 min of time evaluating patient, discussion with staff,  planning for treatment and evaluation. The time was spent examining patient, reviewing the images personally, reviewing the chart, perform high complexity decision making and speaking with the nursing staff regarding recommendations.      Electronically signed by Jose M Peña MD on 3/22/2021 at 3:14 PM

## 2021-03-22 NOTE — DISCHARGE SUMMARY
Hospitalist Discharge Summary        Patient: Elizabeth Barker  YOB: 1950  MRN: [id number]   Acct: [de-identified]    Primary Care Physician: [id number] date  3/21/2021    Discharge date:  3/22/2021 4:30 PM    Chief Complaint on presentation :-  Facial Droop    Discharge Assessment and Plan:-   TIA: Neurology consulted. MRI brain negative. Echo as an OP. ASA on DC. Continue Statin & Fenofibrate. PT/OT recommended home independently. T2DM with Hyperglycemia: DM medications PO continued. HgbA1c 7.2. HTN: benazapril and lopressor, pt complaining of being tired in the afternoon. Spoke with neuro and we decreased BB. Pt needs to check BP everyday and follow up with PCP. Mild Hyponatremia: Resolved. DELMER: Pt is compliant. Hypothyroidism: continue home synthroid, TSH 1.920    HLD: high intensity statin. HDL 27. LDL 58. TGs 301. Hx of prostate cancer and bladder neck contracture s/p surgery. Takes lasix 3x weekly for urine flow. Obesity: counseling on weight loss. Initial H and P and Hospital course:-  Initial H&P \"Ashok Sosa is a 78 y.o. male with PMHx of hypertension, DELMER, hypothyroidism, prostate disease, type 2 diabetes, lifetime non-smoker, obesity who presented to [address] [place] [address] with right-sided facial droop. Patient states that his last known well was 1 AM when he went to bed and had no abnormal symptoms the preceding night. When he woke up this morning around 9 AM his wife noted that he had slurred speech but did not think much of it, he went to [facility] and was told that his face was drooping on the left side, patient then realized his deficits and came to the ER for further evaluation. He denies any visual changes, diplopia, facial numbness or tingling, lower extremity or upper extremity numbness tingling or weakness, dizziness or lightheadedness, vertigo. No difficulty with swallowing or speaking.   He has never had symptoms similar to this before. Denies any recent fevers chills or other illnesses. No recent major changes in medications, denies any alcohol or recreational drug use.     In the ED, telestroke was contacted and recommended full dose aspirin and Plavix loading and admission for further evaluation and stroke work-up. 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 Vital signs reveal hypertension however otherwise within normal limits. Labs show mild hyponatremia, hyperglycemia, and macrocytosis. UA clean with >1000 glucose and elevated spec grav. \"     Neurology consulted. MRI brain negative. PT/OT recommended home indepdently. Neuro okay with echo as an OP. Start ASA. On the day of discharge, it was explained to the patient that it was very important to follow up with his PCP and Neuro to have continued care. Appointments were made and information was given. Physical Exam:-  General appearance: No apparent distress, well developed, appears stated age. Eyes:  Pupils equal, round, and reactive to light. Conjunctivae/corneas clear. HENT: Head normal in appearance. External nares normal.  Oral mucosa moist without lesions. Hearing grossly intact. Neck: Supple, with full range of motion. Trachea midline. No gross JVD appreciated. Respiratory:  Normal respiratory effort. Clear to auscultation, bilaterally without rales or wheezes or rhonchi. Cardiovascular: Normal rate, regular rhythm with normal S1/S2 without murmurs. No lower extremity edema. Abdomen: Soft, non-tender, non-distended with normal bowel sounds. Musculoskeletal: There is no joint swelling or tenderness. Normal tone. No abnormal movements. Skin: Warm and dry. No rashes or lesions. Neurologic:  No focal sensory/motor deficits in the upper and lower extremities. Cranial nerves:  grossly non-focal 2-12. Facial Droop. Psychiatric: Alert and oriented, normal insight and though content. Capillary Refill: Brisk,< 3 seconds. Peripheral Pulses: +2 palpable, equal bilaterally.     Vitals:   Patient Vitals for the past 24 hrs:   BP Temp Temp src Pulse Resp SpO2 Weight   03/22/21 1516 (!) 186/84 97.7 °F (36.5 °C) Oral 67 20 97 %    03/22/21 1113 (!) 150/69 98 °F (36.7 °C) Oral 65 20 97 %    03/22/21 0802 (!) 174/83 98 °F (36.7 °C) Oral 74 16 98 %    03/22/21 0315 129/68 97.6 °F (36.4 °C) Oral 67 20 95 %    03/21/21 2345 (!) 127/58 97.8 °F (36.6 °C) Oral 65 18 95 % 298 lb 4.8 oz (135.3 kg)   03/21/21 1930     17     03/21/21 1915 (!) 151/75 98.2 °F (36.8 °C) Oral 67 18 97 %    03/21/21 1757 139/77 97.9 °F (36.6 °C) Oral 70 18 98 %    03/21/21 1727 (!) 159/71   71 18 97 %      Weight:   Weight: 298 lb 4.8 oz (135.3 kg)   24 hour intake/output:     Intake/Output Summary (Last 24 hours) at 3/22/2021 1630  Last data filed at 3/22/2021 1314  Gross per 24 hour   Intake 1664.91 ml   Output    Net 1664.91 ml     Discharge Medications:-      Medication List      START taking these medications    aspirin 81 MG EC tablet  Take 1 tablet by mouth daily  Start taking on: March 23, 2021     atorvastatin 80 MG tablet  Commonly known as: LIPITOR  Take 1 tablet by mouth nightly        CHANGE how you take these medications    metoprolol succinate 50 MG extended release tablet  Commonly known as: TOPROL XL  Take 0.5 tablets by mouth 2 times daily  What changed: how much to take        CONTINUE taking these medications    benazepril 20 MG tablet  Commonly known as: LOTENSIN     CENTRUM SILVER ADULT 50+ PO     fenofibrate 145 MG tablet  Commonly known as: TRICOR     furosemide 20 MG tablet  Commonly known as: LASIX     Lysine HCl 500 MG Caps     magnesium 250 MG Tabs tablet  Commonly known as: MAGNESIUM-OXIDE     metFORMIN 500 MG tablet  Commonly known as: GLUCOPHAGE     pioglitazone 15 MG tablet  Commonly known as: ACTOS     sildenafil 20 MG tablet  Commonly known as: REVATIO     Synthroid 25 MCG tablet  Generic drug: levothyroxine     Vitamin D3 50 MCG (2000 UT) Caps           Where to Get Your Medications      These medications were sent to [facility] [address], [address] [place] [address]    Phone: [phone]   · aspirin 81 MG EC tablet  · atorvastatin 80 MG tablet  · metoprolol succinate 50 MG extended release tablet          Labs :-  Recent Results (from the past 72 hour(s))   POCT Glucose    Collection Time: 03/21/21  1:53 PM   Result Value Ref Range    POC Glucose 224 (H) 70 - 108 mg/dl   POCT Glucose    Collection Time: 03/21/21  2:06 PM   Result Value Ref Range    POC Glucose 265 (H) 70 - 108 mg/dl   EKG 12 Lead    Collection Time: 03/21/21  2:08 PM   Result Value Ref Range    Ventricular Rate 82 BPM    Atrial Rate 82 BPM    P-R Interval 162 ms    QRS Duration 90 ms    Q-T Interval 374 ms    QTc Calculation (Bazett) 436 ms    P Axis 33 degrees    R Axis -3 degrees    T Axis 9 degrees   CBC Auto Differential    Collection Time: 03/21/21  2:17 PM   Result Value Ref Range    WBC 6.5 4.8 - 10.8 thou/mm3    RBC 4.50 (L) 4.70 - 6.10 mill/mm3    Hemoglobin 15.1 14.0 - 18.0 gm/dl    Hematocrit 44.6 42.0 - 52.0 %    MCV 99.1 (H) 80.0 - 94.0 fL    MCH 33.6 (H) 26.0 - 33.0 pg    MCHC 33.9 32.2 - 35.5 gm/dl    RDW-CV 11.2 (L) 11.5 - 14.5 %    RDW-SD 40.9 35.0 - 45.0 fL    Platelets 938 893 - 216 thou/mm3    MPV 10.1 9.4 - 12.4 fL    Seg Neutrophils 43.4 %    Lymphocytes 40.2 %    Monocytes 11.7 %    Eosinophils 2.9 %    Basophils 0.9 %    Immature Granulocytes 0.9 %    Segs Absolute 2.8 1 - 7 thou/mm3    Lymphocytes Absolute 2.6 1.0 - 4.8 thou/mm3    Monocytes Absolute 0.8 0.4 - 1.3 thou/mm3    Eosinophils Absolute 0.2 0.0 - 0.4 thou/mm3    Basophils Absolute 0.1 0.0 - 0.1 thou/mm3    Immature Grans (Abs) 0.06 0.00 - 0.07 thou/mm3    nRBC 0 /100 wbc   Comprehensive Metabolic Panel w/ Reflex to MG    Collection Time: 03/21/21  2:17 PM   Result Value Ref Range    Glucose 255 (H) 70 - 108 mg/dL    CREATININE 1.0 0.4 - 1.2 mg/dL    BUN 26 (H) 7 - 22 mg/dL    Sodium 134 (L) 135 - 145 meq/L    Potassium reflex Magnesium 4.6 3.5 - 5.2 meq/L    Chloride 97 (L) 98 - 111 meq/L    CO2 28 23 - 33 meq/L    Calcium 9.9 8.5 - 10.5 mg/dL    AST 23 5 - 40 U/L    Alkaline Phosphatase 36 (L) 38 - 126 U/L    Total Protein 7.3 6.1 - 8.0 g/dL    Albumin 4.4 3.5 - 5.1 g/dL    Total Bilirubin 0.4 0.3 - 1.2 mg/dL    ALT 29 11 - 66 U/L   Troponin    Collection Time: 03/21/21  2:17 PM   Result Value Ref Range    Troponin T < 0.010 ng/ml   Brain Natriuretic Peptide    Collection Time: 03/21/21  2:17 PM   Result Value Ref Range    Pro-BNP 44.0 0.0 - 900.0 pg/mL   Anion Gap    Collection Time: 03/21/21  2:17 PM   Result Value Ref Range    Anion Gap 9.0 8.0 - 16.0 meq/L   Glomerular Filtration Rate, Estimated    Collection Time: 03/21/21  2:17 PM   Result Value Ref Range    Est, Glom Filt Rate 74 (A) ml/min/1.73m2   Osmolality    Collection Time: 03/21/21  2:17 PM   Result Value Ref Range    Osmolality Calc 281.7 275.0 - 300.0 mOsmol/kg   Urinalysis Reflex to Culture    Collection Time: 03/21/21  3:10 PM    Specimen: Urine, clean catch   Result Value Ref Range    Glucose, Ur >= 1000 (A) NEGATIVE mg/dl    Bilirubin Urine NEGATIVE NEGATIVE    Ketones, Urine NEGATIVE NEGATIVE    Specific Gravity, Urine > 1.030 (A) 1.002 - 1.030    Blood, Urine NEGATIVE NEGATIVE    pH, UA 6.0 5.0 - 9.0    Protein, UA NEGATIVE NEGATIVE    Urobilinogen, Urine 0.2 0.0 - 1.0 eu/dl    Nitrite, Urine NEGATIVE NEGATIVE    Leukocyte Esterase, Urine NEGATIVE NEGATIVE    Color, UA YELLOW STRAW-YELLOW    Character, Urine CLEAR CLEAR-SL CLOUD   TSH with Reflex    Collection Time: 03/21/21  6:09 PM   Result Value Ref Range    TSH 1.920 0.400 - 4.200 uIU/mL   Vitamin B12 & folate    Collection Time: 03/21/21  6:09 PM   Result Value Ref Range    Vitamin B-12 370 211 - 911 pg/mL    Folate 14.7 4.8 - 24.2 ng/mL   POCT glucose    Collection Time: 03/21/21  6:31 PM   Result Value Ref Range    POC Glucose 191 (H) 70 - 108 mg/dl   POCT glucose - If patient is NPO, on continuous tube feedings, or on parenteral nutrition and on HumaLOG    Collection Time: 03/22/21 12:02 AM   Result Value Ref Range    POC Glucose 137 (H) 70 - 108 mg/dl   POCT glucose - If patient is NPO, on continuous tube feedings, or on parenteral nutrition and on HumaLOG    Collection Time: 03/22/21  3:26 AM   Result Value Ref Range    POC Glucose 162 (H) 70 - 108 mg/dl   CBC    Collection Time: 03/22/21  3:34 AM   Result Value Ref Range    WBC 5.3 4.8 - 10.8 thou/mm3    RBC 4.02 (L) 4.70 - 6.10 mill/mm3    Hemoglobin 13.8 (L) 14.0 - 18.0 gm/dl    Hematocrit 39.9 (L) 42.0 - 52.0 %    MCV 99.3 (H) 80.0 - 94.0 fL    MCH 34.3 (H) 26.0 - 33.0 pg    MCHC 34.6 32.2 - 35.5 gm/dl    RDW-CV 11.1 (L) 11.5 - 14.5 %    RDW-SD 40.4 35.0 - 45.0 fL    Platelets 092 462 - 113 thou/mm3    MPV 10.0 9.4 - 12.4 fL   Hemoglobin A1c    Collection Time: 03/22/21  3:34 AM   Result Value Ref Range    Hemoglobin A1C 7.2 (H) 4.4 - 6.4 %    AVERAGE GLUCOSE 156 (H) 70 - 126 mg/dL   Lipid panel - fasting    Collection Time: 03/22/21  3:34 AM   Result Value Ref Range    Cholesterol, Total 145 100 - 199 mg/dL    Triglycerides 301 (H) 0 - 199 mg/dL    HDL 27 mg/dL    LDL Calculated 58 mg/dL   Basic Metabolic Panel    Collection Time: 03/22/21  3:34 AM   Result Value Ref Range    Sodium 138 135 - 145 meq/L    Potassium 4.0 3.5 - 5.2 meq/L    Chloride 105 98 - 111 meq/L    CO2 23 23 - 33 meq/L    Glucose 149 (H) 70 - 108 mg/dL    BUN 18 7 - 22 mg/dL    CREATININE 0.8 0.4 - 1.2 mg/dL    Calcium 8.7 8.5 - 10.5 mg/dL   Anion Gap    Collection Time: 03/22/21  3:34 AM   Result Value Ref Range    Anion Gap 10.0 8.0 - 16.0 meq/L   Glomerular Filtration Rate, Estimated    Collection Time: 03/22/21  3:34 AM   Result Value Ref Range    Est, Glom Filt Rate >90 ml/min/1.73m2   POCT glucose - If patient is NPO, on continuous tube feedings, or on parenteral nutrition and on HumaLOG    Collection Time: 03/22/21  6:34 AM   Result Value Ref Range    POC Glucose 187 (H) 70 - 108 mg/dl   POCT glucose - If patient is NPO, on continuous tube feedings, or on parenteral nutrition and on HumaLOG    Collection Time: 03/22/21 10:53 AM   Result Value Ref Range    POC Glucose 144 (H) 70 - 108 mg/dl      Urinalysis:      Lab Results   Component Value Date    NITRU NEGATIVE 03/21/2021    BLOODU NEGATIVE 03/21/2021    SPECGRAV 1.025 03/03/2021    GLUCOSEU >= 1000 03/21/2021       Radiology:-  Cta Head W Wo Contrast    Result Date: 3/22/2021  WET READ: Extremely diminutive appearance of the right vertebral artery. 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 The basilar artery appears to be solely supplied by the left vertebral artery. The arteries in the region of the [place] appear patent. Case was discussed by Dr. Katlyn Betts with Dr. China Juarez at approximately 3:32 PM 3/21/2021 via telephone. This examination will be formally read by one of the neuro radiologists tomorrow. Please see that report. FINAL REPORT: PROCEDURE: CTA HEAD W WO CONTRAST, CTA NECK W WO CONTRAST CLINICAL INFORMATION: stroke. Sudden onset left facial droop. COMPARISON: CT head from the same date. TECHNIQUE: Helical CT from the aortic arch through the head in arterial phase during fast bolus administration of 80 mL Isovue-370 injected in the right [facility] with multiplanar reconstructions to include volumetric maximum intensity projection sequences. All CT scans at this facility use dose modulation, iterative reconstruction, and/or weight-based dosing when appropriate to reduce radiation dose to as low as reasonably achievable. FINDINGS:  CTA head:  There are mural calcifications in the cavernous segments of internal carotid arteries with areas of mild stenosis. There is no aneurysm in the intracranial segments of the internal carotid arteries. The bilateral middle cerebral and anterior cerebral arteries are patent without focal abnormality identified. The basilar artery is diminutive without focal stenosis. There are patent bilateral posterior communicating arteries which contribute to the posterior cerebral artery flow which appear patent without focal stenosis. Dural venous sinuses appear patent without focal filling defect. No focal areas of abnormal parenchymal enhancement are identified. CTA NECK: There is a typical 3 vessel arch. The brachiocephalic and left subclavian arteries are patent without flow-limiting stenosis. The common carotid arteries are patent without focal stenosis. There is small focal area of mural calcification at the right carotid bulb without hemodynamically significant stenosis. The right carotid bulb is widely patent. Cervical segments of internal carotid arteries are patent without focal stenosis. There is medialization of the left internal carotid artery with partial retropharyngeal course. The left vertebral artery is dominant. Vertebral arteries are patent throughout their course without focal stenosis. The right vertebral artery terminates in the right posterior inferior cerebellar artery. The basilar artery supplied by the left vertebral artery. There is streak artifact from dental amalgam which partially obscures oral and perioral soft tissues. There is mild mucosal asymmetry at the left hypopharynx from retropharyngeal course of the left internal carotid artery. Given these caveats, mucosal surfaces of the aerodigestive tract are symmetric without focal nodular thickening or visualized mass. There are few scattered bilateral cervical chain lymph nodes. No definite cervical lymphadenopathy is identified. Parotid, submandibular and thyroid glands are unremarkable. Visualized portions of the lungs are clear. There is ACDF bridging C5-C7. There is no evidence of hardware failure. No suspicious osseous lesion is identified. 1. No flow-limiting stenosis or aneurysm in the intracranial circulation. 2. Mild mural plaque at the right carotid bulb without hemodynamically significant stenosis by NASCET criteria. 3. Dominant left vertebral artery with right vertebral artery terminating in the right posterior inferior cerebral artery which likely represents normal anatomic variation. **This report has been created using voice recognition software. It may contain minor errors which are inherent in voice recognition technology. ** Final report electronically signed by Dr. Ness Mccullough MD on 3/22/2021 8:52 AM    Ct Head Wo Contrast (code Stroke)    Result Date: 3/21/2021  PROCEDURE: CT HEAD WO CONTRAST CLINICAL INFORMATION: 24-year-old male with left-sided facial droop. Code stroke. COMPARISON: No prior study. TECHNIQUE: Noncontrast 5 mm axial images were obtained through the brain. All CT scans at this facility use dose modulation, iterative reconstruction, and/or weight-based dosing when appropriate to reduce radiation dose to as low as reasonably achievable. FINDINGS: There is no hemorrhage. There are no intra-or extra-axial collections. There is no hydrocephalus, midline shift or mass effect. The gray-white matter differentiation is preserved. There is some hypoattenuation in the periventricular white matter consistent with chronic microvascular ischemic disease. There are some mucous retention cyst in the bilateral maxillary sinuses. The paranasal sinuses and mastoid air cells are otherwise clear. There is no suspicious calvarial abnormality. No acute intracranial hemorrhage, mass effect or midline shift. **This report has been created using voice recognition software. It may contain minor errors which are inherent in voice recognition technology. ** Final report typical 3 vessel arch. The brachiocephalic and left subclavian arteries are patent without flow-limiting stenosis. The common carotid arteries are patent without focal stenosis. There is small focal area of mural calcification at the right carotid bulb without hemodynamically significant stenosis. The right carotid bulb is widely patent. Cervical segments of internal carotid arteries are patent without focal stenosis. There is medialization of the left internal carotid artery with partial retropharyngeal course. The left vertebral artery is dominant. Vertebral arteries are patent throughout their course without focal stenosis. The right vertebral artery terminates in the right posterior inferior cerebellar artery. The basilar artery supplied by the left vertebral artery. There is streak artifact from dental amalgam which partially obscures oral and perioral soft tissues. There is mild mucosal asymmetry at the left hypopharynx from retropharyngeal course of the left internal carotid artery. Given these caveats, mucosal surfaces of the aerodigestive tract are symmetric without focal nodular thickening or visualized mass. There are few scattered bilateral cervical chain lymph nodes. No definite cervical lymphadenopathy is identified. Parotid, submandibular and thyroid glands are unremarkable. Visualized portions of the lungs are clear. There is ACDF bridging C5-C7. There is no evidence of hardware failure. No suspicious osseous lesion is identified. 1. No flow-limiting stenosis or aneurysm in the intracranial circulation. 2. Mild mural plaque at the right carotid bulb without hemodynamically significant stenosis by NASCET criteria. 3. Dominant left vertebral artery with right vertebral artery terminating in the right posterior inferior cerebral artery which likely represents normal anatomic variation. **This report has been created using voice recognition software.  It may contain minor errors which are inherent in voice recognition technology. ** Final report electronically signed by Dr. Roger Carlos MD on 3/22/2021 8:52 AM    Mri Brain Without Contrast    Result Date: 3/22/2021  PROCEDURE: MRI BRAIN WO CONTRAST INDICATION:  Left facial droop, numbness. COMPARISON: CT head dated 3/21/2021. TECHNIQUE: Multiplanar and multiple spin echo MRI images were obtained of the brain without contrast. FINDINGS: There is moderate parietal predominant volume loss. No significant focal areas of abnormal T2/flair prolongation are identified within the parenchyma. No intra or extra-axial mass is identified. No focal areas of restricted diffusion are present. The major vascular flow voids appear patent. Orbits are unremarkable. There is mild mucosal thickening in the ethmoid air cells and maxillary sinuses with bilateral mucous retention cysts. Mastoid air cells are clear. 1. No evidence of acute intracranial abnormality. 2. Mild mucosal inflammation of the paranasal sinuses. **This report has been created using voice recognition software. It may contain minor errors which are inherent in voice recognition technology. ** Final report electronically signed by Dr. Roger Carlos MD on 3/22/2021 11:23 AM       Follow-up scheduled after discharge :-    in the next few days with ADITYA Moreno  in the next few weeks with Neurology     Consultations during this hospital stay:-  [] NONE [] Cardiology  [] Nephrology  [] Hemo onco   [] GI   [] ID  [] Endocrine  [] Pulm    [x] Neuro    [] Psych   [] Urology  [] ENT   [] G SURGERY   []Ortho    []CV surg    [] Palliative  [] Hospice [] Pain management   []    []TCU   [] PT/OT  OTHERS:-    Disposition: home  Condition at Discharge: Stable    Time Spent:- 45 minutes    Electronically signed by WES Billingsley on 3/22/2021 at 4:30 PM  Discharging Hospitalist

## 2021-03-25 ENCOUNTER — HOSPITAL ENCOUNTER (OUTPATIENT)
Dept: GENERAL RADIOLOGY | Age: 71
Discharge: HOME | End: 2021-03-25
Payer: MEDICARE

## 2021-03-25 ENCOUNTER — HOSPITAL ENCOUNTER (OUTPATIENT)
Dept: NON INVASIVE DIAGNOSTICS | Age: 71
Discharge: HOME | End: 2021-03-25
Payer: MEDICARE

## 2021-03-25 ENCOUNTER — HOSPITAL ENCOUNTER (OUTPATIENT)
Age: 71
Discharge: HOME | End: 2021-03-25
Payer: MEDICARE

## 2021-03-25 DIAGNOSIS — Z87.442 PERSONAL HISTORY OF KIDNEY STONES: ICD-10-CM

## 2021-03-25 DIAGNOSIS — R29.810 FACIAL DROOP: ICD-10-CM

## 2021-03-25 LAB
LV EF: 60 %
LVEF MODALITY: NORMAL

## 2021-03-25 PROCEDURE — 74018 RADEX ABDOMEN 1 VIEW: CPT

## 2021-03-25 PROCEDURE — 93306 TTE W/DOPPLER COMPLETE: CPT

## 2021-03-26 ENCOUNTER — TELEPHONE (OUTPATIENT)
Dept: UROLOGY | Age: 71
End: 2021-03-26

## 2021-03-26 NOTE — TELEPHONE ENCOUNTER
Please let Colby Cooper know his KUB showed a possible small 2 mm calculus in the lower pole of the left kidney. It is nonobstructive and small enough to just monitor at this time.

## 2021-03-26 NOTE — TELEPHONE ENCOUNTER
Patient advised of the message. He declines the need for a ct. He will use a laxative. If he has any problems he will call the office.

## 2021-03-26 NOTE — TELEPHONE ENCOUNTER
Left patient voicemail to call office back. Cant make out on hippa if we are allowed to leave results.

## 2021-03-26 NOTE — TELEPHONE ENCOUNTER
Patient is having some tenderness in the left lower area below his rib cage. It not bad enough to have to be medicated. He will call the office for any fever, chills, uncontrolled pain, or intractable nausea or vomiting. Thank you.

## 2021-03-26 NOTE — TELEPHONE ENCOUNTER
There was constipation on the Xray so he may need to use a laxative to improve discomfort. The stone was not in the ureter so it is not likely to cause pain. Of course it is always possible to have a stone in the ureter that wasn't visualized on the KUB. If he has pain consistent with a stone we can check a CT of the abdomen and pelvis.

## 2021-04-02 ENCOUNTER — HOSPITAL ENCOUNTER (EMERGENCY)
Age: 71
Discharge: HOME | End: 2021-04-02
Attending: FAMILY MEDICINE
Payer: MEDICARE

## 2021-04-02 ENCOUNTER — APPOINTMENT (OUTPATIENT)
Dept: GENERAL RADIOLOGY | Age: 71
End: 2021-04-02
Payer: MEDICARE

## 2021-04-02 ENCOUNTER — APPOINTMENT (OUTPATIENT)
Dept: CT IMAGING | Age: 71
End: 2021-04-02
Payer: MEDICARE

## 2021-04-02 ENCOUNTER — APPOINTMENT (OUTPATIENT)
Dept: MRI IMAGING | Age: 71
End: 2021-04-02
Payer: MEDICARE

## 2021-04-02 VITALS
BODY MASS INDEX: 39.96 KG/M2 | TEMPERATURE: 97.7 F | SYSTOLIC BLOOD PRESSURE: 133 MMHG | HEART RATE: 62 BPM | DIASTOLIC BLOOD PRESSURE: 65 MMHG | RESPIRATION RATE: 20 BRPM | HEIGHT: 72 IN | OXYGEN SATURATION: 97 % | WEIGHT: 295 LBS

## 2021-04-02 DIAGNOSIS — R29.90 STROKE-LIKE SYMPTOMS: Primary | ICD-10-CM

## 2021-04-02 DIAGNOSIS — G51.0 BELL'S PALSY: ICD-10-CM

## 2021-04-02 LAB
ALBUMIN SERPL-MCNC: 4.5 G/DL (ref 3.5–5.1)
ALP BLD-CCNC: 34 U/L (ref 38–126)
ALT SERPL-CCNC: 33 U/L (ref 11–66)
ANION GAP SERPL CALCULATED.3IONS-SCNC: 11 MEQ/L (ref 8–16)
AST SERPL-CCNC: 30 U/L (ref 5–40)
BASOPHILS # BLD: 1 %
BASOPHILS ABSOLUTE: 0.1 THOU/MM3 (ref 0–0.1)
BILIRUB SERPL-MCNC: 0.5 MG/DL (ref 0.3–1.2)
BUN BLDV-MCNC: 20 MG/DL (ref 7–22)
CALCIUM SERPL-MCNC: 9.9 MG/DL (ref 8.5–10.5)
CHLORIDE BLD-SCNC: 103 MEQ/L (ref 98–111)
CO2: 25 MEQ/L (ref 23–33)
CREAT SERPL-MCNC: 0.9 MG/DL (ref 0.4–1.2)
EKG ATRIAL RATE: 68 BPM
EKG P AXIS: 51 DEGREES
EKG P-R INTERVAL: 168 MS
EKG Q-T INTERVAL: 406 MS
EKG QRS DURATION: 98 MS
EKG QTC CALCULATION (BAZETT): 431 MS
EKG R AXIS: -13 DEGREES
EKG T AXIS: 8 DEGREES
EKG VENTRICULAR RATE: 68 BPM
EOSINOPHIL # BLD: 2.9 %
EOSINOPHILS ABSOLUTE: 0.2 THOU/MM3 (ref 0–0.4)
ERYTHROCYTE [DISTWIDTH] IN BLOOD BY AUTOMATED COUNT: 11.4 % (ref 11.5–14.5)
ERYTHROCYTE [DISTWIDTH] IN BLOOD BY AUTOMATED COUNT: 42.9 FL (ref 35–45)
GFR SERPL CREATININE-BSD FRML MDRD: 84 ML/MIN/1.73M2
GLUCOSE BLD-MCNC: 130 MG/DL (ref 70–108)
GLUCOSE BLD-MCNC: 145 MG/DL (ref 70–108)
HCT VFR BLD CALC: 44.7 % (ref 42–52)
HEMOGLOBIN: 14.9 GM/DL (ref 14–18)
IMMATURE GRANS (ABS): 0.04 THOU/MM3 (ref 0–0.07)
IMMATURE GRANULOCYTES: 0.7 %
INR BLD: 1.03 (ref 0.85–1.13)
LYMPHOCYTES # BLD: 31.7 %
LYMPHOCYTES ABSOLUTE: 1.8 THOU/MM3 (ref 1–4.8)
MCH RBC QN AUTO: 33.9 PG (ref 26–33)
MCHC RBC AUTO-ENTMCNC: 33.3 GM/DL (ref 32.2–35.5)
MCV RBC AUTO: 101.6 FL (ref 80–94)
MONOCYTES # BLD: 13.8 %
MONOCYTES ABSOLUTE: 0.8 THOU/MM3 (ref 0.4–1.3)
NUCLEATED RED BLOOD CELLS: 0 /100 WBC
OSMOLALITY CALCULATION: 281.9 MOSMOL/KG (ref 275–300)
PLATELET # BLD: 200 THOU/MM3 (ref 130–400)
PMV BLD AUTO: 9.9 FL (ref 9.4–12.4)
POTASSIUM REFLEX MAGNESIUM: 4.5 MEQ/L (ref 3.5–5.2)
RBC # BLD: 4.4 MILL/MM3 (ref 4.7–6.1)
SEG NEUTROPHILS: 49.9 %
SEGMENTED NEUTROPHILS ABSOLUTE COUNT: 2.9 THOU/MM3 (ref 1.8–7.7)
SODIUM BLD-SCNC: 139 MEQ/L (ref 135–145)
TOTAL PROTEIN: 7.4 G/DL (ref 6.1–8)
TROPONIN T: < 0.01 NG/ML
WBC # BLD: 5.8 THOU/MM3 (ref 4.8–10.8)

## 2021-04-02 PROCEDURE — 85025 COMPLETE CBC W/AUTO DIFF WBC: CPT

## 2021-04-02 PROCEDURE — 6370000000 HC RX 637 (ALT 250 FOR IP): Performed by: STUDENT IN AN ORGANIZED HEALTH CARE EDUCATION/TRAINING PROGRAM

## 2021-04-02 PROCEDURE — 84484 ASSAY OF TROPONIN QUANT: CPT

## 2021-04-02 PROCEDURE — 93005 ELECTROCARDIOGRAM TRACING: CPT | Performed by: STUDENT IN AN ORGANIZED HEALTH CARE EDUCATION/TRAINING PROGRAM

## 2021-04-02 PROCEDURE — 71045 X-RAY EXAM CHEST 1 VIEW: CPT

## 2021-04-02 PROCEDURE — 99449 NTRPROF PH1/NTRNET/EHR 31/>: CPT | Performed by: PSYCHIATRY & NEUROLOGY

## 2021-04-02 PROCEDURE — 82948 REAGENT STRIP/BLOOD GLUCOSE: CPT

## 2021-04-02 PROCEDURE — 70450 CT HEAD/BRAIN W/O DYE: CPT

## 2021-04-02 PROCEDURE — 85610 PROTHROMBIN TIME: CPT

## 2021-04-02 PROCEDURE — 99285 EMERGENCY DEPT VISIT HI MDM: CPT

## 2021-04-02 PROCEDURE — 80053 COMPREHEN METABOLIC PANEL: CPT

## 2021-04-02 PROCEDURE — 93010 ELECTROCARDIOGRAM REPORT: CPT | Performed by: INTERNAL MEDICINE

## 2021-04-02 PROCEDURE — 70551 MRI BRAIN STEM W/O DYE: CPT

## 2021-04-02 PROCEDURE — 36415 COLL VENOUS BLD VENIPUNCTURE: CPT

## 2021-04-02 RX ORDER — MINERAL OIL AND WHITE PETROLATUM 150; 830 MG/G; MG/G
OINTMENT OPHTHALMIC ONCE
Status: COMPLETED | OUTPATIENT
Start: 2021-04-02 | End: 2021-04-02

## 2021-04-02 RX ORDER — PREDNISONE 20 MG/1
60 TABLET ORAL DAILY
Qty: 15 TABLET | Refills: 0 | Status: SHIPPED | OUTPATIENT
Start: 2021-04-02 | End: 2021-04-02 | Stop reason: SDUPTHER

## 2021-04-02 RX ORDER — ACYCLOVIR 400 MG/1
400 TABLET ORAL
Qty: 50 TABLET | Refills: 0 | Status: SHIPPED | OUTPATIENT
Start: 2021-04-02 | End: 2021-04-12

## 2021-04-02 RX ORDER — ASPIRIN 81 MG/1
324 TABLET, CHEWABLE ORAL ONCE
Status: COMPLETED | OUTPATIENT
Start: 2021-04-02 | End: 2021-04-02

## 2021-04-02 RX ORDER — ACYCLOVIR 400 MG/1
400 TABLET ORAL
Qty: 50 TABLET | Refills: 0 | Status: SHIPPED | OUTPATIENT
Start: 2021-04-02 | End: 2021-04-02 | Stop reason: SDUPTHER

## 2021-04-02 RX ORDER — CLOPIDOGREL 300 MG/1
300 TABLET, FILM COATED ORAL ONCE
Status: COMPLETED | OUTPATIENT
Start: 2021-04-02 | End: 2021-04-02

## 2021-04-02 RX ORDER — PREDNISONE 20 MG/1
60 TABLET ORAL DAILY
Qty: 15 TABLET | Refills: 0 | Status: SHIPPED | OUTPATIENT
Start: 2021-04-02 | End: 2021-04-07

## 2021-04-02 RX ADMIN — ASPIRIN 324 MG: 81 TABLET, CHEWABLE ORAL at 10:21

## 2021-04-02 RX ADMIN — WHITE PETROLATUM 57.7 %-MINERAL OIL 31.9 % EYE OINTMENT: at 13:37

## 2021-04-02 RX ADMIN — CLOPIDOGREL BISULFATE 300 MG: 300 TABLET, FILM COATED ORAL at 10:21

## 2021-04-02 ASSESSMENT — ENCOUNTER SYMPTOMS
COLOR CHANGE: 0
SINUS PAIN: 0
RHINORRHEA: 0
SINUS PRESSURE: 0
SHORTNESS OF BREATH: 0
EYE PAIN: 0
ABDOMINAL DISTENTION: 0
CONSTIPATION: 0
ABDOMINAL PAIN: 0
EYE ITCHING: 0
VOMITING: 0
EYE REDNESS: 0
DIARRHEA: 0
NAUSEA: 0
CHEST TIGHTNESS: 0
EYE DISCHARGE: 0

## 2021-04-02 NOTE — ED TRIAGE NOTES
Patient presents to ER from CT scan for code stroke that was paged out by intake. Patient reports last known well at 0700 today with symptoms of left side facial droop and slurred speech. Blood sugar obtained with result of 145. Patient alert and oriented x4. Telemetry applied. Dr. Imer Cazares and Dr. Emily Gomes in room during arrival. Patient reports similar episode on 03/21/2021. Patient reports he had CT scan and MRI with no apparent diagnosis, but possible mini stroke. Patient reports he has been having slight dizziness with decreased hearing.

## 2021-04-02 NOTE — ED PROVIDER NOTES
[place] ENCOUNTER          Pt Name: Adriana Ferraro  MRN: [id number]  [place] 1950  Date of evaluation: 4/2/2021  Treating Resident Physician: Hoa Godwin DO  Supervising Physician: Erasmo Ferris MD    CHIEF COMPLAINT       Chief Complaint   Patient presents with    Facial Droop    Aphasia     History obtained from the patient. HISTORY OF PRESENT ILLNESS    HPI  Adriana Ferraro is a 78 y.o. male with a past medical history of hypertension and diabetes who presents to the emergency department for evaluation of facial droop and aphasia. The patient was simply admitted to the hospital here approximately 1 week ago for similar symptoms and was diagnosed with a TIA. He had left-sided facial droop at that time and had a negative neurologic work-up. He was started on aspirin after discharge. Patient reports that this morning he woke up and throughout the start of his morning he developed slurred speech and left-sided facial droop. The patient states that he has had some persistent facial droop since his last hospitalization, however it was more pronounced today and he did not have as noticeable slurred speech. Patient presented to the emergency department as a stroke alert and the stroke alert protocol was activated. The patient has no other acute complaints at this time. REVIEW OF SYSTEMS   Review of Systems   Constitutional: Negative for fever. HENT: Negative for ear pain, rhinorrhea, sinus pressure and sinus pain. Eyes: Negative for pain, discharge, redness and itching. Respiratory: Negative for chest tightness and shortness of breath. Cardiovascular: Negative for chest pain and palpitations. Gastrointestinal: Negative for abdominal distention, abdominal pain, constipation, diarrhea, nausea and vomiting. Genitourinary: Negative for dysuria, frequency, hematuria and urgency.    Musculoskeletal: Negative for arthralgias. Skin: Negative for color change. Neurological: Positive for facial asymmetry and speech difficulty. Negative for dizziness, syncope and light-headedness. PAST MEDICAL AND SURGICAL HISTORY     Past Medical History:   Diagnosis Date    Arthritis     neck and knees    Cervical pain 8/10/16, 8/24/16    pain injection @ [facility], Dr. Patrick Cherry History of kidney stones     Hypertension     DELMER on CPAP     Prostate cancer ([address]     Shingles 09/04/2016    Strain of quadriceps muscle     left    Thyroid disease      Past Surgical History:   Procedure Laterality Date    COLONOSCOPY  03/10/2017    Dr. Hansa Chatterjee  03/20/2017    bladder neck incision    HERNIA REPAIR  0100    umbilical--Dr. Deann Johansen    LEG TENDON SURGERY  03/2008    left quad muscle repair    NECK SURGERY  09/2017    PROSTATE SURGERY  12/2008    Dr. Clarence Santizo    as child         MEDICATIONS   No current facility-administered medications for this encounter.      Current Outpatient Medications:     acyclovir (ZOVIRAX) 400 MG tablet, Take 1 tablet by mouth 5 times daily for 10 days, Disp: 50 tablet, Rfl: 0    predniSONE (DELTASONE) 20 MG tablet, Take 3 tablets by mouth daily for 5 days, Disp: 15 tablet, Rfl: 0    aspirin 81 MG EC tablet, Take 1 tablet by mouth daily, Disp: 30 tablet, Rfl: 3    atorvastatin (LIPITOR) 80 MG tablet, Take 1 tablet by mouth nightly, Disp: 30 tablet, Rfl: 3    metoprolol succinate (TOPROL XL) 50 MG extended release tablet, Take 0.5 tablets by mouth 2 times daily, Disp: 30 tablet, Rfl: 3    pioglitazone (ACTOS) 15 MG tablet, Take 15 mg by mouth daily, Disp: , Rfl:     metFORMIN (GLUCOPHAGE) 500 MG tablet, Take 1,000 mg by mouth 2 times daily (with meals) , Disp: , Rfl:     sildenafil (REVATIO) 20 MG tablet, Take 20 mg by mouth as needed, Disp: , Rfl:     magnesium (MAGNESIUM-OXIDE) 250 MG TABS tablet, Take 250 mg by mouth 2 times daily, Disp: , Rfl:     Cholecalciferol (VITAMIN D3) 50 MCG (2000 UT) CAPS, Take by mouth, Disp: , Rfl:     fenofibrate (TRICOR) 145 MG tablet, Take 145 mg by mouth daily s [facility] pharmacy: Please dispense generic fenofibrate unless prescriber denote , Disp: , Rfl:     furosemide (LASIX) 20 MG tablet, Take 20 mg by mouth three times a week Cpdzfom-Evpdoqcc-Gjwbhava, Disp: , Rfl:     Lysine HCl 500 MG CAPS, Take 1 capsule by mouth daily, Disp: , Rfl:     benazepril (LOTENSIN) 20 MG tablet, Take 20 mg by mouth 2 times daily , Disp: , Rfl:     levothyroxine (SYNTHROID) 25 MCG tablet, Take 25 mcg by mouth Daily, Disp: , Rfl:     Multiple Vitamins-Minerals (CENTRUM SILVER ADULT 50+ PO), Take by mouth, Disp: , Rfl:       SOCIAL HISTORY     Social History     Social History Narrative    Not on file     Social History     Tobacco Use    Smoking status: Never Smoker    Smokeless tobacco: Never Used   Substance Use Topics    Alcohol use: Yes     Alcohol/week: 0.0 standard drinks     Comment: 1 beer a month    Drug use: No         ALLERGIES     Allergies   Allergen Reactions    Pcn [Penicillins]      Unsure of reaction as child    Kefzol [Cefazolin Sodium] Rash     Feels hot         FAMILY HISTORY     Family History   Problem Relation Age of Onset    Dementia Father          PREVIOUS RECORDS   Previous records reviewed: as noted in HPI. PHYSICAL EXAM     ED Triage Vitals   BP Temp Temp Source Pulse Resp SpO2 Height Weight   04/02/21 0947 04/02/21 0947 04/02/21 0947 04/02/21 0947 04/02/21 0947 04/02/21 0947 04/02/21 1002 04/02/21 1002   (!) 152/114 97.7 °F (36.5 °C) Oral 75 14 97 % 6' (1.829 m) 295 lb (133.8 kg)     Initial vital signs and nursing assessment reviewed and abnormal from HTN. Body mass index is 40.01 kg/m². Pulsoximetry is normal per my interpretation.     Additional Vital Signs:  Vitals:    04/02/21 1304   BP: 133/65   Pulse: 62   Resp: 20   Temp:    SpO2: 97%       Physical Exam  Constitutional: General: He is not in acute distress. Appearance: Normal appearance. He is not ill-appearing. HENT:      Head: Normocephalic and atraumatic. Nose: Nose normal. No congestion or rhinorrhea. Mouth/Throat:      Mouth: Mucous membranes are moist.      Pharynx: Oropharynx is clear. No oropharyngeal exudate or posterior oropharyngeal erythema. Eyes:      Extraocular Movements: Extraocular movements intact. Pupils: Pupils are equal, round, and reactive to light. Neck:      Musculoskeletal: Normal range of motion and neck supple. Cardiovascular:      Rate and Rhythm: Normal rate and regular rhythm. Pulses: Normal pulses. Heart sounds: Normal heart sounds. Pulmonary:      Effort: Pulmonary effort is normal. No respiratory distress. Breath sounds: Normal breath sounds. Abdominal:      General: Abdomen is flat. There is no distension. Palpations: Abdomen is soft. Tenderness: There is no abdominal tenderness. Musculoskeletal: Normal range of motion. General: No swelling or tenderness. Right lower leg: No edema. Left lower leg: No edema. Skin:     General: Skin is warm and dry. Capillary Refill: Capillary refill takes less than 2 seconds. Neurological:      General: No focal deficit present. Mental Status: He is alert and oriented to person, place, and time. Mental status is at baseline. Comments: NIH is 1 due to left-sided facial droop. The patient's facial droop is present at rest, but resolves when the patient is asked to smile. The patient not have pronoun slurred speech when asked to read the phrases on the NIH stroke cards. No pronator drift. No finger-nose ataxia. No weakness.        EKG Interpretation    Interpreted by emergency department physician    Rhythm: normal sinus   Rate: 68  Axis: normal  Ectopy: none  Conduction: normal  ST Segments: normal  T Waves: normal  Q Waves: none    Clinical Impression: NSR at a rate of [address]   Initial Assessment:   1. Left sided facial droop and slurred speech concerning for acute CVA vs TIA. Plan:    IV, labs, stroke alert   telestroke consultation. ED RESULTS   Laboratory results:  Labs Reviewed   CBC WITH AUTO DIFFERENTIAL - Abnormal; Notable for the following components:       Result Value    RBC 4.40 (*)     .6 (*)     MCH 33.9 (*)     RDW-CV 11.4 (*)     All other components within normal limits   COMPREHENSIVE METABOLIC PANEL W/ REFLEX TO MG FOR LOW K - Abnormal; Notable for the following components:    Glucose 130 (*)     Alkaline Phosphatase 34 (*)     All other components within normal limits   GLOMERULAR FILTRATION RATE, ESTIMATED - Abnormal; Notable for the following components:    Est, Glom Filt Rate 84 (*)     All other components within normal limits   POCT GLUCOSE - Abnormal; Notable for the following components:    POC Glucose 145 (*)     All other components within normal limits   TROPONIN   PROTIME-INR   ANION GAP   OSMOLALITY       Radiologic studies results:  MRI BRAIN WO CONTRAST   Final Result       1. No evidence of an acute infarct. 2. Normal signal intensity in the brain. 3. Mild mucosal thickening along the floors of the maxillary sinuses. **This report has been created using voice recognition software. It may contain minor errors which are inherent in voice recognition technology. **      Final report electronically signed by Dr. Jos Cruz on 4/2/2021 11:50 AM      XR CHEST PORTABLE   Final Result   Stable radiographic appearance of the chest. No evidence of an acute process. **This report has been created using voice recognition software. It may contain minor errors which are inherent in voice recognition technology. **      Final report electronically signed by Dr. Mohan Loco on 4/2/2021 10:59 AM      CT HEAD WO CONTRAST (CODE STROKE)   Final Result       1.  Stable CT scan of the brain, no interval change since previous MRI scan dated 22 March 2021.   2. Mild atrophy and dilatation of the third and lateral ventricles. 3. Otherwise negative noncontrast scan of the brain. .               **This report has been created using voice recognition software. It may contain minor errors which are inherent in voice recognition technology. **      Final report electronically signed by DR Donna Eden on 4/2/2021 9:58 AM          ED Medications administered this visit:   Medications   clopidogrel (PLAVIX) tablet 300 mg (300 mg Oral Given 4/2/21 1021)   aspirin chewable tablet 324 mg (324 mg Oral Given 4/2/21 1021)   lubrifresh P.M. (artificial tears) ophthalmic ointment ( Left Eye Given by Other 4/2/21 6771)         ED COURSE     ED Course as of Apr 02 1351 Fri Apr 02, 2021   1005 Consulted with Dr. Vinay Li with telestroke who recommended giving the patient a loading dose of ASA and Plavix and to obtain a MRI brain    [DO]      ED Course User Index  [DO] Ap Rodriguez DO       Strict return precautions and follow up instructions were discussed with the patient prior to discharge, with which the patient agrees. MEDICATION CHANGES     Discharge Medication List as of 4/2/2021  1:32 PM            FINAL DISPOSITION     Work-up in the emergency department was remarkable for a MRI of the brain which was negative for evidence of acute infarct. This case discussed with Dr. Vinay Li with the telestroke service in [place] who recommended giving a loading dose of aspirin and Plavix and care plan was discussed after MRI results. He recommended only continuing the patient on aspirin and no Plavix as an outpatient. The patient does state that he has some increase in his hearing on the left side of his face and does report a strong history of cold sores and herpes simplex. It is possible the patient could be having some Bell's palsy symptoms.   We will plan to treat the patient for Bell's palsy

## 2021-04-02 NOTE — ED NOTES
MRI questionnaire completed and on computer stand. MRI notified.       Wilian Diaz RN  04/02/21 3816

## 2021-04-02 NOTE — VIRTUAL HEALTH
MG TABS tablet Take 250 mg by mouth 2 times daily    Historical Provider, MD   Cholecalciferol (VITAMIN D3) 50 MCG (2000 UT) CAPS Take by mouth    Historical Provider, MD   fenofibrate (TRICOR) 145 MG tablet Take 145 mg by mouth daily s [facility] pharmacy: Please dispense generic fenofibrate unless prescriber denote     Historical Provider, MD   furosemide (LASIX) 20 MG tablet Take 20 mg by mouth three times a week Wllxkty-Dmxcfotv-Liqkocdb    Historical Provider, MD   Lysine HCl 500 MG CAPS Take 1 capsule by mouth daily    Historical Provider, MD   benazepril (LOTENSIN) 20 MG tablet Take 20 mg by mouth 2 times daily     Historical Provider, MD   levothyroxine (SYNTHROID) 25 MCG tablet Take 25 mcg by mouth Daily    Historical Provider, MD   Multiple Vitamins-Minerals (CENTRUM SILVER ADULT 50+ PO) Take by mouth    Historical Provider, MD    Scheduled Meds:  Continuous Infusions:  PRN Meds:.  Past Medical History   has a past medical history of Arthritis, Cervical pain, History of kidney stones, Hypertension, DELMER on CPAP, Prostate cancer ([facility] [address] Shingles, Strain of quadriceps muscle, and Thyroid disease. Social History  Social History     Socioeconomic History    Marital status:      Spouse name: Not on file    Number of children: Not on file    Years of education: Not on file    Highest education level: Not on file   Occupational History    Not on file   Social Needs    Financial resource strain: Not on file    Food insecurity     Worry: Not on file     Inability: Not on file    Transportation needs     Medical: Not on file     Non-medical: Not on file   Tobacco Use    Smoking status: Never Smoker    Smokeless tobacco: Never Used   Substance and Sexual Activity    Alcohol use:  Yes     Alcohol/week: 0.0 standard drinks     Comment: 1 beer a month    Drug use: No    Sexual activity: Yes   Lifestyle    Physical activity     Days per week: Not on file     Minutes per session: Not on file    Stress: Not on file   Relationships    Social connections     Talks on phone: Not on file     Gets together: Not on file     Attends Jew service: Not on file     Active member of club or organization: Not on file     Attends meetings of clubs or organizations: Not on file     Relationship status: Not on file    Intimate partner violence     Fear of current or ex partner: Not on file     Emotionally abused: Not on file     Physically abused: Not on file     Forced sexual activity: Not on file   Other Topics Concern    Not on file   Social History Narrative    Not on file     Family History      Problem Relation Age of Onset    Dementia Father        OBJECTIVE  /65   Pulse 62   Temp 97.7 °F (36.5 °C) (Oral)   Resp 20   Ht 6' (1.829 m)   Wt 295 lb (133.8 kg)   SpO2 97%   BMI 40.01 kg/m²     NIH Stroke Scale  Interval: Baseline  Level of Consciousness (1a. ): Alert  LOC Questions (1b. ): Answers both correctly  LOC Commands (1c. ): Performs both tasks correctly  Best Gaze (2. ): Normal  Visual (3. ): No visual loss  Facial Palsy (4. ): (!) Minor paralysis  Motor Arm, Left (5a. ): No drift  Motor Arm, Right (5b. ): No drift  Motor Leg, Left (6a. ): No drift  Motor Leg, Right (6b. ): No drift  Limb Ataxia (7. ): Absent  Sensory (8. ): Normal  Best Language (9. ): No aphasia  Dysarthria (10. ): Normal  Extinction and Inattention (11): No abnormality  Total: 1  Pre-Morbid mRS: 1    Imaging:  Images were personally reviewed with VIZ. AI and PACS used to review images including:  CT brain without contrast: no hemorrhage  CTA imaging 3-: no large vessel occlusion  Mri brain no acute stroke  Assessment    78 y.o. male who presents with recent stroke workup for tia symptoms 3- with neg cta and mri brain discharged on aspirin with symptoms of facial droop and trouble speaking. He awoke with dysarthria and slurred speech worse than since discharge. No other motor deficits. Differential DDx:  1.  Bell's palsy vs tia      Recommendations:  1. NIH 2  2. Recommend Outpatient Neurology Consult for further assessment and evaluation   3. May consider treatment for bell's palsy based on confirmation through clinical exam.  4. Aspirin daily  5. Statins ldl<70  6. followup with neurology  7. Normotensive sbp        Discussed with ED Physician    At least 36 min of Telemedicine and time in conversation directly with ED staff and physician for the patient who is in imminent and life threatening deterioration without further treatment and evaluation. This Virtual Visit was conducted with patient's (and/or legal guardian's) consent, to provide telestroke consultation and necessary medical care. Time spent examining patient, reviewing the images personally, reviewing the chart, perform high complexity decision making and speaking with the nursing staff regarding recommendations    This is a Phone Consult, I have not seen the patient face to face, the telemedicine device was not utilized. Denia Galan MD   Stroke, Neurocritical Care And/or [address] [place] [address] Stroke Network  [facility] [address]  Electronically signed 4/2/2021 at 4:32 PM    Patient Location:  [address] [place] [address] EMERGENCY DEPT    Provider Location ([facility]): [place], [place]    This virtual visit was conducted via interactive/real-time audio/video.

## 2021-05-04 ENCOUNTER — TELEPHONE (OUTPATIENT)
Dept: UROLOGY | Age: 71
End: 2021-05-04

## 2021-05-04 DIAGNOSIS — R35.0 URINARY FREQUENCY: ICD-10-CM

## 2021-05-04 DIAGNOSIS — R39.15 URGENCY OF URINATION: Primary | ICD-10-CM

## 2021-05-04 NOTE — TELEPHONE ENCOUNTER
Patient c/o urgency or frequency. He gets a sensation after he finishes voiding to urinate again. He thinks he empties his bladder ok and denies retention. He denies burning, fever, or chills. He wants to go to the lab tomorrow to give a urine sample while he is in town for another appointment. Order placed     Please advise. Thank you.

## 2021-05-05 ENCOUNTER — NURSE ONLY (OUTPATIENT)
Dept: LAB | Age: 71
End: 2021-05-05

## 2021-05-05 ENCOUNTER — INITIAL CONSULT (OUTPATIENT)
Dept: NEUROLOGY | Age: 71
End: 2021-05-05
Payer: MEDICARE

## 2021-05-05 VITALS
HEART RATE: 76 BPM | WEIGHT: 296 LBS | DIASTOLIC BLOOD PRESSURE: 70 MMHG | SYSTOLIC BLOOD PRESSURE: 148 MMHG | BODY MASS INDEX: 40.09 KG/M2 | HEIGHT: 72 IN

## 2021-05-05 DIAGNOSIS — R35.0 URINARY FREQUENCY: ICD-10-CM

## 2021-05-05 DIAGNOSIS — Q67.0 FACIAL ASYMMETRY: Primary | ICD-10-CM

## 2021-05-05 DIAGNOSIS — R39.15 URGENCY OF URINATION: ICD-10-CM

## 2021-05-05 DIAGNOSIS — G51.0 LEFT-SIDED BELL'S PALSY: ICD-10-CM

## 2021-05-05 LAB
BACTERIA: ABNORMAL
BILIRUBIN URINE: NEGATIVE
BLOOD, URINE: NEGATIVE
CASTS: ABNORMAL /LPF
CASTS: ABNORMAL /LPF
CHARACTER, URINE: ABNORMAL
COLOR: YELLOW
CRYSTALS: ABNORMAL
EPITHELIAL CELLS, UA: ABNORMAL /HPF
GLUCOSE, URINE: NEGATIVE MG/DL
KETONES, URINE: NEGATIVE
LEUKOCYTE EST, POC: NEGATIVE
MISCELLANEOUS LAB TEST RESULT: ABNORMAL
NITRITE, URINE: NEGATIVE
PH UA: 8.5 (ref 5–9)
PROTEIN UA: NEGATIVE MG/DL
RBC URINE: ABNORMAL /HPF
RENAL EPITHELIAL, UA: ABNORMAL
SPECIFIC GRAVITY UA: 1.02 (ref 1–1.03)
UROBILINOGEN, URINE: 0.2 EU/DL (ref 0–1)
WBC UA: ABNORMAL /HPF
YEAST: ABNORMAL

## 2021-05-05 PROCEDURE — 3017F COLORECTAL CA SCREEN DOC REV: CPT | Performed by: PSYCHIATRY & NEUROLOGY

## 2021-05-05 PROCEDURE — G8417 CALC BMI ABV UP PARAM F/U: HCPCS | Performed by: PSYCHIATRY & NEUROLOGY

## 2021-05-05 PROCEDURE — G8427 DOCREV CUR MEDS BY ELIG CLIN: HCPCS | Performed by: PSYCHIATRY & NEUROLOGY

## 2021-05-05 PROCEDURE — 4040F PNEUMOC VAC/ADMIN/RCVD: CPT | Performed by: PSYCHIATRY & NEUROLOGY

## 2021-05-05 PROCEDURE — 99204 OFFICE O/P NEW MOD 45 MIN: CPT | Performed by: PSYCHIATRY & NEUROLOGY

## 2021-05-05 PROCEDURE — 1123F ACP DISCUSS/DSCN MKR DOCD: CPT | Performed by: PSYCHIATRY & NEUROLOGY

## 2021-05-05 PROCEDURE — 1036F TOBACCO NON-USER: CPT | Performed by: PSYCHIATRY & NEUROLOGY

## 2021-05-05 NOTE — LETTER
[address] [facility] Neurology  [facility] [address]  [facility] [address]  Phone: [phone]  Fax: Faustino Mckeon MD    May 24, 2021     [address]  [address] [facility]    Patient: Roseanna Ramey   MR Number: [id number]   YOB: 1950   Date of Visit: 5/5/2021       Dear [address]:    Thank you for referring Debi Em to me for evaluation/treatment. Below are the relevant portions of my assessment and plan of care. If you have questions, please do not hesitate to call me. I look forward to following Harika Dobbs along with you.     Sincerely,    MD Juliann Humphreys Ra, Ra, MD

## 2021-05-05 NOTE — LETTER
[address] [address]  Dept: [phone]  Dept Fax: [phone]  Loc: [address], APRN - CNP        5/5/2021      Patient:  Sundeep Madera  MRN:  [id number]  YOB: 1950  Date of Visit:  5/5/2021    Dear Dr. Sidney Ahuja ref. provider found,    Thank you for referring Aydee Vasquez to me for consultation. Please see attached visit summary with my findings. If you have any questions, please do not hesitate to call me.       Sincerely,         ARTURO Hardy CNP

## 2021-05-05 NOTE — PATIENT INSTRUCTIONS
1. Referral to physical therapy re: left sided bells palsy  2. Call with any new symptoms or concerns.    3. Follow up as needed

## 2021-05-07 LAB — URINE CULTURE, ROUTINE: NORMAL

## 2021-05-07 NOTE — TELEPHONE ENCOUNTER
Patient advised of the urine results. His symptoms come and go. Lab visit scheduled for [facility] on 05/11/2021.

## 2021-05-11 ENCOUNTER — NURSE ONLY (OUTPATIENT)
Dept: UROLOGY | Age: 71
End: 2021-05-11
Payer: MEDICARE

## 2021-05-11 DIAGNOSIS — R35.0 URINARY FREQUENCY: Primary | ICD-10-CM

## 2021-05-11 LAB
BILIRUBIN URINE: NEGATIVE
BLOOD URINE, POC: NEGATIVE
CHARACTER, URINE: CLEAR
COLOR, URINE: YELLOW
GLUCOSE URINE: NEGATIVE MG/DL
KETONES, URINE: NEGATIVE
LEUKOCYTE CLUMPS, URINE: NEGATIVE
NITRITE, URINE: NEGATIVE
PH, URINE: 5.5 (ref 5–9)
POST VOID RESIDUAL (PVR): NORMAL ML
PROTEIN, URINE: NEGATIVE MG/DL
SPECIFIC GRAVITY, URINE: >= 1.03 (ref 1–1.03)
UROBILINOGEN, URINE: 0.2 EU/DL (ref 0–1)

## 2021-05-11 PROCEDURE — 51798 US URINE CAPACITY MEASURE: CPT | Performed by: NURSE PRACTITIONER

## 2021-05-11 PROCEDURE — 81003 URINALYSIS AUTO W/O SCOPE: CPT | Performed by: NURSE PRACTITIONER

## 2021-05-11 RX ORDER — TROSPIUM CHLORIDE 20 MG/1
20 TABLET, FILM COATED ORAL 2 TIMES DAILY
Qty: 60 TABLET | Refills: 3 | Status: SHIPPED | OUTPATIENT
Start: 2021-05-11 | End: 2021-10-01 | Stop reason: SDUPTHER

## 2021-05-11 NOTE — PROGRESS NOTES
I have personally verified, reviewed, released, signed, authenticated, authorized, confirmed,finalized, and approved the actions of the [facility]. PVR 33 mls. Urine dip normal.  Trial Trospium 20 mg PO BID. F/u in [address] [place] [address] in 3 months with PVR. Call if symptoms worsen or fail to improve prior.

## 2021-05-24 NOTE — PROGRESS NOTES
Chief Complaint   Patient presents with    Consultation     facial droop       Magda Alcaraz is a 78 y.o. male who presents today for evaluation of left facial droop started 6 weeks ago. He underwent stroke work up that was negative. 2 weeks later he had another episode of left facial drooping and again had stroke work up that was negative. He was told at the time he had left bell's palsy. He was treated with steroids and acyclovir which has helped some, but he is not yet back to normal. He does have previous history of cold sores. He denies any mouth or face rash prior to onset of symptoms. No injury prior to these symptoms. MRI brain done 4/2/21 showed No evidence of an acute infarct. Normal signal intensity in the brain. He denies chest pain. No shortness of breath, no neck pain. No vision changes. No dysphagia. No fever. No rash. No weight loss. History provided by patient accompanied by his wife.        Past Medical History:   Diagnosis Date    Arthritis     neck and knees    Cervical pain 8/10/16, 8/24/16    pain injection @ [facility], Dr. Bong Galan History of kidney stones     Hypertension     DELMER on CPAP     Pro[facility] cancer ([facility] [address]     Shingles 09/04/2016    Strain of quadriceps muscle     left    Thyroid disease        Patient Active Problem List   Diagnosis    Pro[facility] cancer ([facility] [address]    Obstructive sleep apnea on CPAP    Obesity    Hypertension    Acquired contracture of bladder neck    TIA (transient ischemic attack)    Facial droop       Allergies   Allergen Reactions    Pcn [Penicillins]      Unsure of reaction as child    Kefzol [Cefazolin Sodium] Rash     Feels hot       Current Outpatient Medications   Medication Sig Dispense Refill    aspirin 81 MG EC tablet Take 1 tablet by mouth daily 30 tablet 3    atorvastatin (LIPITOR) 80 MG tablet Take 1 tablet by mouth nightly 30 tablet 3    metoprolol succinate (TOPROL XL) 50 MG extended release tablet Take 0.5 tablets by mouth of clubs or organizations: None     Relationship status: None    Intimate partner violence     Fear of current or ex partner: None     Emotionally abused: None     Physically abused: None     Forced sexual activity: None   Other Topics Concern    None   Social History Narrative    None       Family History   Problem Relation Age of Onset    Dementia Father     No Known Problems Sister     Cancer Brother         prostate         I reviewed the past medical history, allergies, medications, social history and family history. Review of Systems   All systems reviewed, and are all negative, except what is mentioned in HPI      Vitals:    05/05/21 0858 05/05/21 0906   BP: (!) 146/70 (!) 148/70   Site: Left Upper Arm Right Upper Arm   Position: Sitting Sitting   Cuff Size: Large Adult Large Adult   Pulse: 76    Weight: 296 lb (134.3 kg)    Height: 6' (1.829 m)        Physical Examination:  General appearance - alert, well appearing, and in no distress, oriented to person, place, and time and over weight  Mental status- Level of Alertness: awake  Orientation: person, place, time  Memory: normal  Fund of Knowledge: normal  Attention/Concentration: normal  Language: normal. Mood is normal.   Neck - supple, no significant adenopathy, carotids upstroke normal bilaterally. There is no axillary lymphadenopathy. There is no carotid bruit . No neck lymphadenopathy.  No thyroid enlargement   Neurological -   Cranial [id number]:.   Cranial nerve II: Normal   Cranial nerve III: Pupils: equal, round, reactive to light  Cranial nerves III, IV, VI: Extraocular Movements: intact   Cranial nerve V: Facial sensation: intact   Cranial nerve VII:Facial strength: there is mild left facial asymmetry  Cranial nerve VIII: Hearing: intact, wears hearing aids  Cranial nerve IX: Palate Elevation intact bilaterally  Cranial nerve XI: Shoulder shrug intact bilaterally  Cranial nerve XII: Tongue midline   neck supple without rigidity, there is limitation of range of motion of the neck, bilaterally. DTR's are Intact distal and symmetric  Babinski sign negative  Motor exam is 5/5 in the upper and lower extremities. Normal muscle tone. There is no muscle atrophy. Sensory is intact for light touch   Coordination: finger to nose,  intact  Gait and station intact   Abnormal movement none, vibration reduced distally  Skin - warm, dry to touch, normal coloration, no rashes, no suspicious skin lesions  Superficial temporal artery pulses are normal.   There is no limitation of range of motion of the neck. There is no resting tremor, no pin rolling, no bradykinesia, no Hypohonia, normal blink rate. Musculoskeletal: Has no hand arthritis, no limitation of ROM in any of the four extremities. There is no leg edema. The Heart was regular in rate and rhythm. No heart murmur  Chest Clear, with  good effort. Abdomen soft, intact bowel sounds. Results for orders placed during the hospital encounter of 03/21/21   CTA HEAD W WO CONTRAST    Narrative WET READ:    Extremely diminutive appearance of the right vertebral artery. 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 The basilar artery appears to be solely supplied by the left vertebral artery. The arteries in the region of the [place] appear patent. Case was discussed by Dr. Hi Ruano with Dr. Asha Painter at approximately 3:32 PM 3/21/2021 via telephone. This examination will be formally read by one of the neuro radiologists tomorrow. Please see that report. FINAL REPORT:    PROCEDURE: CTA HEAD W WO CONTRAST, CTA NECK W WO CONTRAST    CLINICAL INFORMATION: stroke. Sudden onset left facial droop. COMPARISON: CT head from the same date.     TECHNIQUE: Helical CT from the aortic arch through the head in arterial phase during fast bolus administration of 80 mL Isovue-370 injected in the right [facility] [place] with multiplanar reconstructions to include TECHNIQUE: Helical CT from the aortic arch through the head in arterial phase during fast bolus administration of 80 mL Isovue-370 injected in the right [facility] with multiplanar reconstructions to include volumetric maximum intensity projection sequences. All CT scans at this facility use dose modulation, iterative reconstruction, and/or weight-based dosing when appropriate to reduce radiation dose to as low as reasonably achievable. FINDINGS:   CTA head: There are mural calcifications in the cavernous segments of internal carotid arteries with areas of mild stenosis. There is no aneurysm in the intracranial segments of the internal carotid arteries. The bilateral middle cerebral and anterior cerebral   arteries are patent without focal abnormality identified. The basilar artery is diminutive without focal stenosis. There are patent bilateral posterior communicating arteries which contribute to the posterior cerebral artery flow which appear patent   without focal stenosis. Dural venous sinuses appear patent without focal filling defect. No focal areas of abnormal parenchymal enhancement are identified. CTA NECK:  There is a typical 3 vessel arch. The brachiocephalic and left subclavian arteries are patent without flow-limiting stenosis. The common carotid arteries are patent without focal stenosis. There is small focal area of mural calcification at the right   carotid bulb without hemodynamically significant stenosis. The right carotid bulb is widely patent. Cervical segments of internal carotid arteries are patent without focal stenosis. There is medialization of the left internal carotid artery with partial   retropharyngeal course. The left vertebral artery is dominant. Vertebral arteries are patent throughout their course without focal stenosis. The right vertebral artery terminates in the right posterior inferior cerebellar artery. The basilar artery   supplied by the left vertebral artery.     There is There are no intra-or extra-axial collections. There is no hydrocephalus, midline shift or mass effect. There is mineralization in the basal ganglia bilaterally. No other areas of susceptibility artifact are present. The major intracranial vascular flow voids are present. The midline craniocervical junction structures are normal.  The pituitary gland and brainstem are normal.    There is some mucosal thickening along the floors of the maxillary sinuses bilaterally. This was also present previously. Impression    1. No evidence of an acute infarct. 2. Normal signal intensity in the brain. 3. Mild mucosal thickening along the floors of the maxillary sinuses. **This report has been created using voice recognition software. It may contain minor errors which are inherent in voice recognition technology. **    Final report electronically signed by Dr. Coretta Malik on 4/2/2021 11:50 AM     Reviewed   Results for orders placed during the hospital encounter of 04/02/21   CT HEAD WO CONTRAST (CODE STROKE)    Narrative PROCEDURE: CT HEAD WO CONTRAST    CLINICAL INFORMATION: poss stroke. COMPARISON: MRI scan of the brain dated 22nd of March 2021. TECHNIQUE: Noncontrast 5 mm axial images were obtained through the brain. Sagittal and coronal reconstructions were obtained. All CT scans at this facility use dose modulation, iterative reconstruction, and/or weight-based dosing when appropriate to reduce radiation dose to as low as reasonably achievable. FINDINGS:    There is mild atrophy and dilatation of the third and lateral ventricles. There is no hemorrhage. There are no intra-or extra-axial collections. There is no  midline shift or mass effect. There is calcification in the cavernous segments of both internal carotid arteries. .     The paranasal sinuses and mastoid air cells are normally aerated. There is no suspicious calvarial abnormality. Impression    1.  Stable CT scan of the brain, no interval change since previous MRI scan dated 22 March 2021.  2. Mild atrophy and dilatation of the third and lateral ventricles. 3. Otherwise negative noncontrast scan of the brain. .          **This report has been created using voice recognition software. It may contain minor errors which are inherent in voice recognition technology. **    Final report electronically signed by DR Shy Quigley on 4/2/2021 9:58 AM     We reviewed the patient records from referring provider and available information in the EHR       ASSESSMENT:      Diagnosis Orders   1. Left facial asymmetry     2. Left-sided Bell's palsy        This is a 14-year-old male who presents with left facial droop that has been ongoing for the last 6 weeks. At the time of initial onset of his symptoms, I reviewed the patient pertinent labs and records in the EHR and from other providers. he did undergo stroke work-up including MRI brain that was negative for any acute findings. I reviewed the MRI brain on 4/2/2021 and agree with interpretation. 2 weeks following the initial episode, he had another episode of left facial asymmetry. Again, he underwent stroke work-up that was negative. He was told then at that time that he had left-sided Bell's palsy. He was treated with steroids and acyclovir which is helped some, but he does not feel he is back to his normal self. On exam, there is very mild left facial asymmetry. There is no left eyelid lag. We will arrange for him to undergo physical therapy regarding the left-sided Bell's palsy. The patient and his wife asked questions reflecting understanding and agreed with following plan. Plan    1. Referral to physical therapy re: left sided bells palsy  2. Call with any new symptoms or concerns. 3. Follow up as needed       Total time 53 min.      Cosme Jolley MD

## 2021-08-11 ENCOUNTER — TELEPHONE (OUTPATIENT)
Dept: UROLOGY | Age: 71
End: 2021-08-11

## 2021-08-11 NOTE — TELEPHONE ENCOUNTER
Patient no showed his appt today, he called back and stated he forgot about it. He also stated he isn't having any problems right now, so he didn't want to r/s at this time. He stated he will keep his march appt with [facility] for his one year follow up.

## 2021-09-20 ENCOUNTER — TELEPHONE (OUTPATIENT)
Dept: UROLOGY | Age: 71
End: 2021-09-20

## 2021-09-20 ENCOUNTER — NURSE ONLY (OUTPATIENT)
Dept: LAB | Age: 71
End: 2021-09-20

## 2021-09-20 DIAGNOSIS — R30.0 DYSURIA: ICD-10-CM

## 2021-09-20 DIAGNOSIS — R30.0 DYSURIA: Primary | ICD-10-CM

## 2021-09-20 NOTE — TELEPHONE ENCOUNTER
Patient c/o pain on the tip of the penis that started last Thursday that makes him feel like he has to urinate. It similar to how he felt in May with having the urge to void right after urinating. He denies retentionHe is able to empty his bladder ok. He denies fever,chills redness at the penis or drainage. The trospium was helping his other infection. Order placed for urine. Please advise. Thank you.

## 2021-09-21 LAB
BACTERIA: NORMAL
BILIRUBIN URINE: NEGATIVE
BLOOD, URINE: NEGATIVE
CASTS: NORMAL /LPF
CASTS: NORMAL /LPF
CHARACTER, URINE: CLEAR
COLOR: YELLOW
CRYSTALS: NORMAL
EPITHELIAL CELLS, UA: NORMAL /HPF
GLUCOSE, URINE: NEGATIVE MG/DL
KETONES, URINE: NEGATIVE
LEUKOCYTE ESTERASE, URINE: NEGATIVE
MISCELLANEOUS LAB TEST RESULT: NORMAL
NITRITE, URINE: NEGATIVE
PH UA: 7.5 (ref 5–9)
PROTEIN UA: NEGATIVE MG/DL
RBC URINE: NORMAL /HPF
RENAL EPITHELIAL, UA: NORMAL
SPECIFIC GRAVITY UA: 1.02 (ref 1–1.03)
UROBILINOGEN, URINE: 1 EU/DL (ref 0–1)
WBC UA: NORMAL /HPF
YEAST: NORMAL

## 2021-09-21 NOTE — TELEPHONE ENCOUNTER
Patient took uricalm max which did help. He still has a little tingling. He was calling for the urine. How long can he take it? The bottle says take for max of 2 days. Please advise. Thank you.

## 2021-09-22 LAB
ORGANISM: ABNORMAL
URINE CULTURE, ROUTINE: ABNORMAL

## 2021-09-22 NOTE — TELEPHONE ENCOUNTER
Patient advised UA was negative, culture is pending and may use uricalm prn for 3 days. He voiced understanding and will call back if he wants to schedule the appointment.

## 2021-09-23 RX ORDER — CIPROFLOXACIN 500 MG/1
500 TABLET, FILM COATED ORAL 2 TIMES DAILY
Qty: 14 TABLET | Refills: 0 | Status: SHIPPED | OUTPATIENT
Start: 2021-09-23 | End: 2021-09-30

## 2021-09-23 NOTE — TELEPHONE ENCOUNTER
Patient advised of the urine results. He voiced understanding and already picked up the cipro. If his symptoms do not improve or get worse, he will call the office.

## 2021-09-23 NOTE — TELEPHONE ENCOUNTER
Please let pt know his urine culture showed a very small amount of bacteria. I will send a script for antibiotic to his pharmacy. If he continues to have symptoms or they worsen despite antibiotic he needs to make an appt for evaluation.

## 2021-10-01 ENCOUNTER — OFFICE VISIT (OUTPATIENT)
Dept: UROLOGY | Age: 71
End: 2021-10-01
Payer: MEDICARE

## 2021-10-01 VITALS
SYSTOLIC BLOOD PRESSURE: 142 MMHG | BODY MASS INDEX: 39.01 KG/M2 | DIASTOLIC BLOOD PRESSURE: 68 MMHG | HEIGHT: 72 IN | WEIGHT: 288 LBS

## 2021-10-01 DIAGNOSIS — R30.0 DYSURIA: Primary | ICD-10-CM

## 2021-10-01 DIAGNOSIS — R39.15 URGENCY OF URINATION: ICD-10-CM

## 2021-10-01 DIAGNOSIS — R35.0 URINARY FREQUENCY: ICD-10-CM

## 2021-10-01 LAB
BILIRUBIN URINE: NEGATIVE
BLOOD URINE, POC: NEGATIVE
CHARACTER, URINE: CLEAR
COLOR, URINE: YELLOW
GLUCOSE URINE: NEGATIVE MG/DL
KETONES, URINE: NEGATIVE
LEUKOCYTE CLUMPS, URINE: NEGATIVE
NITRITE, URINE: NEGATIVE
PH, URINE: 7 (ref 5–9)
POST VOID RESIDUAL (PVR): 47 ML
PROTEIN, URINE: NEGATIVE MG/DL
SPECIFIC GRAVITY, URINE: 1.02 (ref 1–1.03)
UROBILINOGEN, URINE: 0.2 EU/DL (ref 0–1)

## 2021-10-01 PROCEDURE — 81003 URINALYSIS AUTO W/O SCOPE: CPT | Performed by: NURSE PRACTITIONER

## 2021-10-01 PROCEDURE — 4040F PNEUMOC VAC/ADMIN/RCVD: CPT | Performed by: NURSE PRACTITIONER

## 2021-10-01 PROCEDURE — 99214 OFFICE O/P EST MOD 30 MIN: CPT | Performed by: NURSE PRACTITIONER

## 2021-10-01 PROCEDURE — G8417 CALC BMI ABV UP PARAM F/U: HCPCS | Performed by: NURSE PRACTITIONER

## 2021-10-01 PROCEDURE — 51798 US URINE CAPACITY MEASURE: CPT | Performed by: NURSE PRACTITIONER

## 2021-10-01 PROCEDURE — 3017F COLORECTAL CA SCREEN DOC REV: CPT | Performed by: NURSE PRACTITIONER

## 2021-10-01 PROCEDURE — 1036F TOBACCO NON-USER: CPT | Performed by: NURSE PRACTITIONER

## 2021-10-01 PROCEDURE — G8427 DOCREV CUR MEDS BY ELIG CLIN: HCPCS | Performed by: NURSE PRACTITIONER

## 2021-10-01 PROCEDURE — G8484 FLU IMMUNIZE NO ADMIN: HCPCS | Performed by: NURSE PRACTITIONER

## 2021-10-01 PROCEDURE — 1123F ACP DISCUSS/DSCN MKR DOCD: CPT | Performed by: NURSE PRACTITIONER

## 2021-10-01 RX ORDER — TROSPIUM CHLORIDE 20 MG/1
20 TABLET, FILM COATED ORAL 2 TIMES DAILY
Qty: 60 TABLET | Refills: 5 | Status: SHIPPED | OUTPATIENT
Start: 2021-10-01

## 2021-10-01 ASSESSMENT — ENCOUNTER SYMPTOMS
NAUSEA: 0
VOMITING: 0
ABDOMINAL PAIN: 0
BACK PAIN: 0

## 2021-10-01 NOTE — PROGRESS NOTES
[id number] [address] [place] [id number]  Dept: [phone]  Loc: [phone]    Visit Date: 10/1/2021        HPI:     Mary Pereira is a 73 y.o. male who presents today for:  Chief Complaint   Patient presents with    Follow-up     frequency/urgency/dysuria        HPI   Pt seen in follow up for urinary frequency, urgency, dysuria     Pt has a hx of prostate cancer and bladder neck contracture. He underwent RALRP 12/2008. [place] 3+4=7 [id number] prostate cancer. He underwent TURBN for bladder neck contracture 3/2017. Also has a hx of kidney stones, ED and decreased libido. Pt was started on Trospium in May for urinary frequency and urgency. Recently reported discomfort at the end of the penis and increased urinary frequency and discomfort with urination. Urine culture 9/20/21 with a small amount of Morganella morganii treated with antibiotics. Symptoms continued despite treatment with antibiotics as such he is here for further evaluation. PVR 47 mls and urine dip negative.         Current Outpatient Medications   Medication Sig Dispense Refill    trospium (SANCTURA) 20 MG tablet Take 1 tablet by mouth 2 times daily 60 tablet 3    aspirin 81 MG EC tablet Take 1 tablet by mouth daily 30 tablet 3    atorvastatin (LIPITOR) 80 MG tablet Take 1 tablet by mouth nightly 30 tablet 3    metoprolol succinate (TOPROL XL) 50 MG extended release tablet Take 0.5 tablets by mouth 2 times daily 30 tablet 3    pioglitazone (ACTOS) 15 MG tablet Take 15 mg by mouth daily      metFORMIN (GLUCOPHAGE) 500 MG tablet Take 1,000 mg by mouth 2 times daily (with meals)       sildenafil (REVATIO) 20 MG tablet Take 20 mg by mouth as needed      magnesium (MAGNESIUM-OXIDE) 250 MG TABS tablet Take 250 mg by mouth 2 times daily      Cholecalciferol (VITAMIN D3) 50 MCG (2000 UT) CAPS Take by mouth      fenofibrate (TRICOR) 145 MG tablet Take 145 mg by mouth daily [facility] pharmacy: Please dispense generic fenofibrate unless prescriber denote       furosemide (LASIX) 20 MG tablet Take 20 mg by mouth three times a week [id number]      Lysine HCl 500 MG CAPS Take 1 capsule by mouth daily      benazepril (LOTENSIN) 20 MG tablet Take 20 mg by mouth 2 times daily       levothyroxine (SYNTHROID) 25 MCG tablet Take 25 mcg by mouth Daily      Multiple Vitamins-Minerals (CENTRUM SILVER ADULT 50+ PO) Take by mouth       No current facility-administered medications for this visit. Past Medical History  Misbah Nix  has a past medical history of Arthritis, Cervical pain, History of kidney stones, Hypertension, DELMER on CPAP, Prostate cancer ([facility] [address] Shingles, Strain of quadriceps muscle, and Thyroid disease. Past Surgical History  The patient  has a past surgical history that includes Prostate surgery (12/2008); Leg Tendon Surgery (03/2008); hernia repair (2008); Tonsillectomy (1958); Colonoscopy (03/10/2017); Cystocopy (03/20/2017); and Neck surgery (09/2017). Family History  This patient's family history includes Cancer in his brother; Dementia in his father; No Known Problems in his sister. Social History  Misbah Nix  reports that he has never smoked. He has never used smokeless tobacco. He reports current alcohol use. He reports that he does not use drugs. Subjective:      Review of Systems   Constitutional: Negative for activity change, appetite change, chills, diaphoresis, fatigue, fever and unexpected weight change. Gastrointestinal: Negative for abdominal pain, nausea and vomiting. Genitourinary: Negative for decreased urine volume, difficulty urinating, dysuria, flank pain, frequency, hematuria and urgency. Musculoskeletal: Negative for back pain. Objective:   BP (!) 142/68   Ht 6' (1.829 m)   Wt 288 lb (130.6 kg)   BMI 39.06 kg/m²     Physical Exam  Vitals reviewed.    Constitutional:       General: He is not in acute distress. Appearance: Normal appearance. He is well-developed. He is not ill-appearing or diaphoretic. HENT:      Head: Normocephalic and atraumatic. Right Ear: External ear normal.      Left Ear: External ear normal.      Nose: Nose normal.      Mouth/Throat:      Mouth: Mucous membranes are moist.   Eyes:      General: No scleral icterus. Right eye: No discharge. Left eye: No discharge. Neck:      Vascular: No JVD. Trachea: No tracheal deviation. Pulmonary:      Effort: Pulmonary effort is normal. No respiratory distress. Abdominal:      General: There is no distension. Tenderness: There is no abdominal tenderness. There is no right CVA tenderness or left CVA tenderness. Genitourinary:     Comments: Normal penis and urethral meatus open  Musculoskeletal:         General: Normal range of motion. Neurological:      Mental Status: He is alert and oriented to person, place, and time. Mental status is at baseline. Psychiatric:         Mood and Affect: Mood normal.         Behavior: Behavior normal.         Thought Content:  Thought content normal.         POC  Results for POC orders placed in visit on 10/01/21   POCT Urinalysis No Micro (Auto)   Result Value Ref Range    Glucose, Ur Negative NEGATIVE mg/dl    Bilirubin Urine Negative     Ketones, Urine Negative NEGATIVE    Specific Gravity, Urine 1.025 1.002 - 1.030    Blood, UA POC Negative NEGATIVE    pH, Urine 7.00 5.0 - 9.0    Protein, Urine Negative NEGATIVE mg/dl    Urobilinogen, Urine 0.20 0.0 - 1.0 eu/dl    Nitrite, Urine Negative NEGATIVE    Leukocyte Clumps, Urine Negative NEGATIVE    Color, Urine Yellow [id number]    Character, Urine Clear [id number]   poct post void residual   Result Value Ref Range    post void residual 47 ml         Patients recent PSA values are as follows  Lab Results   Component Value Date    PSA  02/10/2021      Comment:      <0.064    PSA  03/02/2020      Comment:      <0.03    PSA 03/13/2019      Comment:      <0.03        Recent BUN/Creatinine:  Lab Results   Component Value Date    BUN 20 04/02/2021    CREATININE 0.9 04/02/2021       Assessment:   Urethral pain, bladder pressure  Prostate cancer  Hx of bladder neck contracture  Erectile dysfunction  Hx kidney stones    Plan:     Pt having pain in urethra and bladder pressure. Hx of bladder neck contracture. Current PVR acceptable. No lesions or balanitis present. Urethral meatus slightly smaller in size but open without significant stricture to visual inspection. Given hx discussed possible cystoscopy to further evaluate and pt would like to proceed. We will schedule him for a cystoscopy with one of the physicians. Continue trospium.

## 2021-10-26 ENCOUNTER — PROCEDURE VISIT (OUTPATIENT)
Dept: UROLOGY | Age: 71
End: 2021-10-26
Payer: MEDICARE

## 2021-10-26 VITALS
HEIGHT: 72 IN | BODY MASS INDEX: 39.14 KG/M2 | DIASTOLIC BLOOD PRESSURE: 92 MMHG | WEIGHT: 289 LBS | SYSTOLIC BLOOD PRESSURE: 149 MMHG

## 2021-10-26 DIAGNOSIS — C61 PROSTATE CANCER (HCC): ICD-10-CM

## 2021-10-26 DIAGNOSIS — R30.0 DYSURIA: Primary | ICD-10-CM

## 2021-10-26 LAB
BILIRUBIN URINE: NEGATIVE
BLOOD URINE, POC: NEGATIVE
CHARACTER, URINE: CLEAR
COLOR, URINE: YELLOW
GLUCOSE URINE: NEGATIVE MG/DL
KETONES, URINE: NEGATIVE
LEUKOCYTE CLUMPS, URINE: NEGATIVE
NITRITE, URINE: NEGATIVE
PH, URINE: 5.5 (ref 5–9)
PROTEIN, URINE: NEGATIVE MG/DL
SPECIFIC GRAVITY, URINE: 1.02 (ref 1–1.03)
UROBILINOGEN, URINE: 0.2 EU/DL (ref 0–1)

## 2021-10-26 PROCEDURE — 81003 URINALYSIS AUTO W/O SCOPE: CPT | Performed by: UROLOGY

## 2021-10-26 PROCEDURE — 52000 CYSTOURETHROSCOPY: CPT | Performed by: UROLOGY

## 2021-10-26 NOTE — PROGRESS NOTES
Cystoscopy Operative Note    Patient:  Ramond Primrose  MRN: [id number]  YOB: 1950    Date: 10/26/21  Surgeon: Esthela Dejesus MD  Anesthesia: Urethral 2% Xylocaine   Indications: LUTS, hx of BNC  Position: Supine    Findings:   The patient was prepped and draped in the usual sterile fashion. The flexible cystoscope was advanced through the urethra and into the bladder. The bladder was thoroughly inspected and the following was noted:    Residual Urine: Minimal   Urethra: No abnormalities of the urethra are noted. Prostate: not present  Bladder: No tumors or CIS noted. No bladder diverticulum. Mild  trabeculation noted. Ureters: Clear efflux from both ureters. Orifices with normal configuration and location. The cystoscope was removed. The patient tolerated the procedure well. No bladder neck contracture  Was able to pass scope through meatus without any issues  Cont follow up with [facility]- no obstruction.  Treat symptomatic infections

## 2022-05-18 ENCOUNTER — OFFICE VISIT (OUTPATIENT)
Dept: UROLOGY | Age: 72
End: 2022-05-18
Payer: MEDICARE

## 2022-05-18 VITALS
HEIGHT: 72 IN | DIASTOLIC BLOOD PRESSURE: 68 MMHG | WEIGHT: 293 LBS | BODY MASS INDEX: 39.68 KG/M2 | SYSTOLIC BLOOD PRESSURE: 122 MMHG

## 2022-05-18 DIAGNOSIS — R35.0 URINARY FREQUENCY: Primary | ICD-10-CM

## 2022-05-18 LAB
BILIRUBIN, POC: NORMAL
BLOOD URINE, POC: NORMAL
CLARITY, POC: CLEAR
COLOR, POC: YELLOW
GLUCOSE URINE, POC: NORMAL
KETONES, POC: NORMAL
LEUKOCYTE EST, POC: NORMAL
NITRITE, POC: NORMAL
PH, POC: 7.5
POST VOID RESIDUAL (PVR): 48 ML
PROTEIN, POC: NORMAL
SPECIFIC GRAVITY, POC: 1.02
UROBILINOGEN, POC: 0.2

## 2022-05-18 PROCEDURE — 81003 URINALYSIS AUTO W/O SCOPE: CPT | Performed by: NURSE PRACTITIONER

## 2022-05-18 PROCEDURE — G8417 CALC BMI ABV UP PARAM F/U: HCPCS | Performed by: NURSE PRACTITIONER

## 2022-05-18 PROCEDURE — G8427 DOCREV CUR MEDS BY ELIG CLIN: HCPCS | Performed by: NURSE PRACTITIONER

## 2022-05-18 PROCEDURE — 1123F ACP DISCUSS/DSCN MKR DOCD: CPT | Performed by: NURSE PRACTITIONER

## 2022-05-18 PROCEDURE — 3017F COLORECTAL CA SCREEN DOC REV: CPT | Performed by: NURSE PRACTITIONER

## 2022-05-18 PROCEDURE — 99213 OFFICE O/P EST LOW 20 MIN: CPT | Performed by: NURSE PRACTITIONER

## 2022-05-18 PROCEDURE — 51798 US URINE CAPACITY MEASURE: CPT | Performed by: NURSE PRACTITIONER

## 2022-05-18 PROCEDURE — 4040F PNEUMOC VAC/ADMIN/RCVD: CPT | Performed by: NURSE PRACTITIONER

## 2022-05-18 PROCEDURE — 1036F TOBACCO NON-USER: CPT | Performed by: NURSE PRACTITIONER

## 2022-05-18 RX ORDER — CYCLOBENZAPRINE HCL 5 MG
5 TABLET ORAL 3 TIMES DAILY PRN
COMMUNITY

## 2022-05-18 RX ORDER — TRAMADOL HYDROCHLORIDE 50 MG/1
50 TABLET ORAL EVERY 6 HOURS PRN
COMMUNITY

## 2022-05-18 RX ORDER — POTASSIUM 75 MG
TABLET ORAL
COMMUNITY

## 2022-05-18 ASSESSMENT — ENCOUNTER SYMPTOMS
NAUSEA: 0
BACK PAIN: 0
ABDOMINAL PAIN: 0
VOMITING: 0

## 2022-05-18 NOTE — PROGRESS NOTES
50 [facility]  [address] [facility] [address] [place] [address]  Dept: [phone]  Loc: [phone]    Visit Date: 5/18/2022        HPI:     Alat Montiel is a 73 y.o. male who presents today for:  Chief Complaint   Patient presents with    Follow-up     Hx of bladder neck contracture    Prostate Cancer     HX kidney stones    Erectile Dysfunction       HPI   Pt seen in follow up for urinary frequency, urgency, dysuria. Pt has a hx of prostate cancer and bladder neck contracture. He underwent RALRP 12/2008.  [place] 3+4=7 [id number] prostate cancer.  He underwent TURBN for bladder neck contracture 3/2017. Also has a hx of kidney stones, ED and decreased libido.       Pt was started on Trospium in May for urinary frequency and urgency. Recently reported discomfort at the end of the penis and increased urinary frequency and discomfort with urination. Urine culture 9/20/21 with a small amount of Morganella morganii treated with antibiotics. Symptoms continued despite treatment with antibiotics and underwent cystoscopy 10/1/21 by Dr. Barth Person showing no abnormalities of the urethra or bladder neck contracture, some mild bladder wall trabeculation. Today Ashok reports he is doing well. Denies incontinence. Notes some urgency but symptoms are better and he is satisfied with the trospium 20 mg BID. IPSS score in office today 6/35. Current Outpatient Medications   Medication Sig Dispense Refill    cyclobenzaprine (FLEXERIL) 5 MG tablet Take 5 mg by mouth 3 times daily as needed for Muscle spasms      ELDERBERRY PO Take by mouth Gummies 50mg daily  eldderberry 50mg, vit c 45mg, zinc 3.8mg      Potassium (POTASSIMIN) 75 MG TABS Take by mouth      traMADol (ULTRAM) 50 MG tablet Take 50 mg by mouth every 6 hours as needed for Pain.       trospium (SANCTURA) 20 MG tablet Take 1 tablet by mouth 2 times daily 60 tablet 5    aspirin 81 MG EC tablet Take 1 tablet by mouth daily 30 tablet 3    atorvastatin (LIPITOR) 80 MG tablet Take 1 tablet by mouth nightly 30 tablet 3    metoprolol succinate (TOPROL XL) 50 MG extended release tablet Take 0.5 tablets by mouth 2 times daily (Patient taking differently: Take 50 mg by mouth 2 times daily ) 30 tablet 3    pioglitazone (ACTOS) 15 MG tablet Take 15 mg by mouth daily      metFORMIN (GLUCOPHAGE) 500 MG tablet Take 1,000 mg by mouth 2 times daily (with meals)       magnesium (MAGNESIUM-OXIDE) 250 MG TABS tablet Take 250 mg by mouth 2 times daily      Cholecalciferol (VITAMIN D3) 50 MCG (2000 UT) CAPS Take by mouth      fenofibrate (TRICOR) 145 MG tablet Take 145 mg by mouth daily s [facility] pharmacy: Please dispense generic fenofibrate unless prescriber denote       furosemide (LASIX) 20 MG tablet Take 20 mg by mouth three times a week [id number]-[id number]-Ddivfrum      Lysine HCl 500 MG CAPS Take 1 capsule by mouth daily      benazepril (LOTENSIN) 20 MG tablet Take 20 mg by mouth 2 times daily       levothyroxine (SYNTHROID) 25 MCG tablet Take 25 mcg by mouth Daily      Multiple Vitamins-Minerals (CENTRUM SILVER ADULT 50+ PO) Take by mouth       No current facility-administered medications for this visit. Past Medical History  Joel Sotut  has a past medical history of Arthritis, Cervical pain, History of kidney stones, Hypertension, DELMER on CPAP, Prostate cancer ([facility] [address] Shingles, Strain of quadriceps muscle, and Thyroid disease. Past Surgical History  The patient  has a past surgical history that includes Prostate surgery (12/2008); Leg Tendon Surgery (03/2008); hernia repair (2008); Tonsillectomy (1958); Colonoscopy (03/10/2017); Cystocopy (03/20/2017); and Neck surgery (09/2017). Family History  This patient's family history includes Cancer in his brother; Dementia in his father; No Known Problems in his sister. Social History  Joel Stout  reports that he has never smoked.  He has never used smokeless tobacco. He reports current alcohol use. He reports that he does not use drugs. Subjective:      Review of Systems   Constitutional: Negative for activity change, appetite change, chills, diaphoresis, fatigue, fever and unexpected weight change. Gastrointestinal: Negative for abdominal pain, nausea and vomiting. Genitourinary: Negative for decreased urine volume, difficulty urinating, dysuria, flank pain, frequency, hematuria and urgency. Musculoskeletal: Negative for back pain. Objective:   /68   Ht 6' (1.829 m)   Wt 293 lb (132.9 kg)   BMI 39.74 kg/m²     Physical Exam  Vitals reviewed. Constitutional:       General: He is not in acute distress. Appearance: Normal appearance. He is well-developed. He is not ill-appearing or diaphoretic. HENT:      Head: Normocephalic and atraumatic. Right Ear: External ear normal.      Left Ear: External ear normal.      Nose: Nose normal.      Mouth/Throat:      Mouth: Mucous membranes are moist.   Eyes:      General: No scleral icterus. Right eye: No discharge. Left eye: No discharge. Neck:      Vascular: No JVD. Trachea: No tracheal deviation. Pulmonary:      Effort: Pulmonary effort is normal. No respiratory distress. Abdominal:      General: There is no distension. Tenderness: There is no abdominal tenderness. There is no right CVA tenderness or left CVA tenderness. Musculoskeletal:         General: Normal range of motion. Neurological:      Mental Status: He is alert and oriented to person, place, and time. Mental status is at baseline. Psychiatric:         Mood and Affect: Mood normal.         Behavior: Behavior normal.         Thought Content: Thought content normal.         POC  No results found for this visit on 05/18/22.       Patients recent PSA values are as follows  Lab Results   Component Value Date    PSA  02/10/2021      Comment:      <0.064    PSA  03/02/2020      Comment:      <0.03 PSA  03/13/2019      Comment:      <0.03        Recent BUN/Creatinine:  Lab Results   Component Value Date    BUN 20 04/02/2021    CREATININE 0.9 04/02/2021       Assessment:   Prostate cancer  Urinary frequency, urgency, improved  Hx of bladder neck contracture  Erectile dysfunction  Hx kidney stones    Plan:     Urinary frequency and urgency improved. Urethral pain resolved. No further UTIs. Recent cystoscopy without acute abnormality. PVR in office today acceptable. Urine dip negative for infection. Continue trospium 20 mg BID and f/u in 1 year with PVR. PSA a few days prior.

## 2023-01-16 ENCOUNTER — OFFICE VISIT (OUTPATIENT)
Dept: CARDIOLOGY CLINIC | Age: 73
End: 2023-01-16
Payer: MEDICARE

## 2023-01-16 ENCOUNTER — TELEPHONE (OUTPATIENT)
Dept: CARDIOLOGY CLINIC | Age: 73
End: 2023-01-16

## 2023-01-16 VITALS
HEIGHT: 72 IN | HEART RATE: 73 BPM | WEIGHT: 298.2 LBS | SYSTOLIC BLOOD PRESSURE: 152 MMHG | BODY MASS INDEX: 40.39 KG/M2 | DIASTOLIC BLOOD PRESSURE: 68 MMHG

## 2023-01-16 DIAGNOSIS — R06.02 SOB (SHORTNESS OF BREATH): ICD-10-CM

## 2023-01-16 DIAGNOSIS — E78.00 PURE HYPERCHOLESTEROLEMIA: ICD-10-CM

## 2023-01-16 DIAGNOSIS — I49.3 FREQUENT PVCS: Primary | ICD-10-CM

## 2023-01-16 DIAGNOSIS — I10 PRIMARY HYPERTENSION: ICD-10-CM

## 2023-01-16 PROCEDURE — G8484 FLU IMMUNIZE NO ADMIN: HCPCS | Performed by: NUCLEAR MEDICINE

## 2023-01-16 PROCEDURE — 99204 OFFICE O/P NEW MOD 45 MIN: CPT | Performed by: NUCLEAR MEDICINE

## 2023-01-16 PROCEDURE — 1123F ACP DISCUSS/DSCN MKR DOCD: CPT | Performed by: NUCLEAR MEDICINE

## 2023-01-16 PROCEDURE — 3078F DIAST BP <80 MM HG: CPT | Performed by: NUCLEAR MEDICINE

## 2023-01-16 PROCEDURE — 3017F COLORECTAL CA SCREEN DOC REV: CPT | Performed by: NUCLEAR MEDICINE

## 2023-01-16 PROCEDURE — G8417 CALC BMI ABV UP PARAM F/U: HCPCS | Performed by: NUCLEAR MEDICINE

## 2023-01-16 PROCEDURE — 1036F TOBACCO NON-USER: CPT | Performed by: NUCLEAR MEDICINE

## 2023-01-16 PROCEDURE — 3077F SYST BP >= 140 MM HG: CPT | Performed by: NUCLEAR MEDICINE

## 2023-01-16 PROCEDURE — 93000 ELECTROCARDIOGRAM COMPLETE: CPT | Performed by: NUCLEAR MEDICINE

## 2023-01-16 PROCEDURE — G8427 DOCREV CUR MEDS BY ELIG CLIN: HCPCS | Performed by: NUCLEAR MEDICINE

## 2023-01-16 RX ORDER — OXYBUTYNIN CHLORIDE 5 MG/1
5 TABLET, EXTENDED RELEASE ORAL DAILY
COMMUNITY
Start: 2022-12-09

## 2023-01-16 ASSESSMENT — ENCOUNTER SYMPTOMS
BACK PAIN: 0
BLOOD IN STOOL: 0
CONSTIPATION: 0
ABDOMINAL PAIN: 0
VOMITING: 0
PHOTOPHOBIA: 0
COLOR CHANGE: 0
NAUSEA: 0
SHORTNESS OF BREATH: 1
DIARRHEA: 0
CHEST TIGHTNESS: 0
RECTAL PAIN: 0
ANAL BLEEDING: 0
ABDOMINAL DISTENTION: 0

## 2023-01-16 NOTE — PROGRESS NOTES
[address] [facility] [address]  Dept: [phone]  Dept Fax: [phone]  Loc: [phone]    Visit Date: 1/16/2023    Renee Martell is a 81 y.o. male who presents todayfor:  Chief Complaint   Patient presents with    New Patient    Hypertension    Hyperlipidemia    Shortness of Breath   Here for the first time  Frequent PVCs   No known CAd before  Does have multiple risk factors for CAD  Does have  HTn and hyperlipidemia  On medical Rx for a while  Does have Dm for 4-5 years   Here for evaluation   The PVCs discovered on ECG   Had Holter, echo , carotids, sleep study   Does have sleep apnea  On CPAP   Usually follows with Kate Camejo   Had all the work up with him  No chest pain   No changes in breathing  No smoking  Family history of CAD         HPI:  Hypertension  Associated symptoms include shortness of breath. Pertinent negatives include no chest pain, headaches, neck pain or palpitations. Hyperlipidemia  Associated symptoms include shortness of breath. Pertinent negatives include no chest pain or myalgias. Shortness of Breath  Pertinent negatives include no abdominal pain, chest pain, headaches, leg swelling, neck pain, rash or vomiting.    Past Medical History:   Diagnosis Date    Arthritis     neck and knees    Cervical pain 8/10/16, 8/24/16    pain injection @ [facility], Dr. Shanon Barry     History of kidney stones     Hypertension     DELMER on CPAP     Prostate cancer ([facility] [address]     Shingles 09/04/2016    Strain of quadriceps muscle     left    Thyroid disease       Past Surgical History:   Procedure Laterality Date    COLONOSCOPY  03/10/2017    Dr. Leonid Rodriguez  03/20/2017    bladder neck incision    HERNIA REPAIR  1913    umbilical--Dr. Coburn    LEG TENDON SURGERY  03/2008    left quad muscle repair    NECK SURGERY  09/2017    PROSTATE SURGERY  12/2008    Dr. Alfred Cheng    as child Family History   Problem Relation Age of Onset    Dementia Father     No Known Problems Sister     Cancer Brother         prostate     Social History     Tobacco Use    Smoking status: Never    Smokeless tobacco: Never   Substance Use Topics    Alcohol use: Yes     Alcohol/week: 0.0 standard drinks     Comment: 1 beer a month      Current Outpatient Medications   Medication Sig Dispense Refill    oxybutynin (DITROPAN-XL) 5 MG extended release tablet Take 5 mg by mouth daily      ELDERBERRY PO Take by mouth Gummies 50mg daily  eldderberry 50mg, vit c 45mg, zinc 3.8mg      Potassium (POTASSIMIN) 75 MG TABS Take by mouth      aspirin 81 MG EC tablet Take 1 tablet by mouth daily 30 tablet 3    atorvastatin (LIPITOR) 80 MG tablet Take 1 tablet by mouth nightly 30 tablet 3    metoprolol succinate (TOPROL XL) 50 MG extended release tablet Take 0.5 tablets by mouth 2 times daily (Patient taking differently: Take 50 mg by mouth 2 times daily) 30 tablet 3    pioglitazone (ACTOS) 15 MG tablet Take 15 mg by mouth daily      metFORMIN (GLUCOPHAGE) 500 MG tablet Take 1,000 mg by mouth 2 times daily (with meals)       magnesium (MAGNESIUM-OXIDE) 250 MG TABS tablet Take 250 mg by mouth 2 times daily      Cholecalciferol (VITAMIN D3) 50 MCG (2000 UT) CAPS Take by mouth      fenofibrate (TRICOR) 145 MG tablet Take 145 mg by mouth daily s [facility]: Please dispense generic fenofibrate unless prescriber denote       furosemide (LASIX) 20 MG tablet Take 20 mg by mouth three times a week Gsottwc-[id number]-[id number]      Lysine HCl 500 MG CAPS Take 1 capsule by mouth daily      benazepril (LOTENSIN) 20 MG tablet Take 20 mg by mouth 2 times daily       levothyroxine (SYNTHROID) 25 MCG tablet Take 25 mcg by mouth Daily      Multiple Vitamins-Minerals (CENTRUM SILVER ADULT 50+ PO) Take by mouth       No current facility-administered medications for this visit.      Allergies   Allergen Reactions    Pcn [Penicillins]      Unsure of reaction as child    Kefzol [Cefazolin Sodium] Rash     Feels hot     Health Maintenance   Topic Date Due    COVID-19 Vaccine (1) Never done    Depression Screen  Never done    Hepatitis C screen  Never done    DTaP/Tdap/Td vaccine (1 - Tdap) Never done    Colorectal Cancer Screen  Never done    Shingles vaccine (1 of 2) Never done    Pneumococcal 65+ years Vaccine (1 - PCV) Never done    Annual Wellness Visit (AWV)  Never done    Flu vaccine (1) Never done    A1C test (Diabetic or Prediabetic)  04/04/2023    Lipids  10/05/2023    Prostate Specific Antigen (PSA) Screening or Monitoring  10/05/2023    Hepatitis A vaccine  Aged Out    Hib vaccine  Aged Out    Meningococcal (ACWY) vaccine  Aged Out       Subjective:  Review of Systems   Constitutional:  Positive for fatigue. HENT:  Negative for drooling and hearing loss. Eyes:  Negative for photophobia. Respiratory:  Positive for shortness of breath. Negative for chest tightness. Cardiovascular:  Negative for chest pain, palpitations and leg swelling. Gastrointestinal:  Negative for abdominal distention, abdominal pain, anal bleeding, blood in stool, constipation, diarrhea, nausea, rectal pain and vomiting. Endocrine: Negative for polyphagia. Genitourinary:  Negative for dysuria, frequency and urgency. Musculoskeletal:  Negative for arthralgias, back pain, gait problem, joint swelling, myalgias, neck pain and neck stiffness. Skin:  Negative for color change, pallor, rash and wound. Neurological:  Negative for seizures and headaches. Psychiatric/Behavioral:  Negative for agitation, behavioral problems, confusion and decreased concentration. Objective:  Physical Exam  HENT:      Head: Normocephalic. Right Ear: Tympanic membrane normal.      Nose: Nose normal.      Mouth/Throat:      Mouth: Mucous membranes are moist.   Eyes:      Pupils: Pupils are equal, round, and reactive to light.    Cardiovascular:      Rate and Rhythm: Normal rate and regular rhythm. Heart sounds: Murmur heard. No gallop. Pulmonary:      Effort: No respiratory distress. Breath sounds: No stridor. No wheezing, rhonchi or rales. Chest:      Chest wall: No tenderness. Abdominal:      General: There is no distension. Palpations: There is no mass. Tenderness: There is no abdominal tenderness. There is no right CVA tenderness, left CVA tenderness, guarding or rebound. Hernia: No hernia is present. Musculoskeletal:         General: No swelling, tenderness, deformity or signs of injury. Cervical back: Normal range of motion. Right lower leg: No edema. Left lower leg: No edema. Skin:     Coloration: Skin is not jaundiced or pale. Findings: No bruising, erythema, lesion or rash. Neurological:      Mental Status: He is alert and oriented to person, place, and time. Cranial Nerves: No cranial nerve deficit. Sensory: No sensory deficit. Motor: No weakness. Coordination: Coordination normal.      Gait: Gait normal.      Deep Tendon Reflexes: Reflexes normal.   Psychiatric:         Mood and Affect: Mood normal.     BP (!) 152/68   Pulse 73   Ht 6' (1.829 m)   Wt 298 lb 3.2 oz (135.3 kg)   BMI 40.44 kg/m²     Assessment:      Diagnosis Orders   1. Frequent PVCs  EKG 12 lead      2. Primary hypertension        3. Pure hypercholesterolemia        Higher risk for CAD  Work up thus far is okay   Occasional PVCs  ECG in office was done today. I reviewed the ECG. No acute findings      Plan:  No follow-ups on file. As above  Stress test   Get records from [facility] and [facility]  Continue risk factor modification and medical management  Thank you for allowing me to participate in the care of your patient.  Please don't hesitate to contact me regarding any further issues related to the patient care    Orders Placed:  Orders Placed This Encounter   Procedures    EKG 12 lead     Order Specific Question:   Reason for Exam?     Answer: Other       Medications Prescribed:  No orders of the defined types were placed in this encounter. Discussed use, benefit, and side effects of prescribed medications. All patient questions answered. Pt voicedunderstanding. Instructed to continue current medications, diet and exercise. Continue risk factor modification and medical management. Patient agreed with treatment plan. Follow up as directed.     Electronically signedby Adarsh Fine MD on 1/16/2023 at 9:20 AM

## 2023-01-25 DIAGNOSIS — I10 PRIMARY HYPERTENSION: ICD-10-CM

## 2023-01-25 DIAGNOSIS — R06.02 SOB (SHORTNESS OF BREATH): ICD-10-CM

## 2023-01-25 DIAGNOSIS — E78.00 PURE HYPERCHOLESTEROLEMIA: ICD-10-CM

## 2023-01-25 DIAGNOSIS — I49.3 FREQUENT PVCS: ICD-10-CM

## 2023-03-28 ENCOUNTER — OFFICE VISIT (OUTPATIENT)
Dept: CARDIOLOGY CLINIC | Age: 73
End: 2023-03-28
Payer: MEDICARE

## 2023-03-28 VITALS
WEIGHT: 301 LBS | HEIGHT: 72 IN | SYSTOLIC BLOOD PRESSURE: 176 MMHG | HEART RATE: 76 BPM | BODY MASS INDEX: 40.77 KG/M2 | DIASTOLIC BLOOD PRESSURE: 83 MMHG

## 2023-03-28 DIAGNOSIS — I49.3 PVC (PREMATURE VENTRICULAR CONTRACTION): ICD-10-CM

## 2023-03-28 DIAGNOSIS — E78.01 FAMILIAL HYPERCHOLESTEROLEMIA: ICD-10-CM

## 2023-03-28 DIAGNOSIS — I10 PRIMARY HYPERTENSION: Primary | ICD-10-CM

## 2023-03-28 DIAGNOSIS — R42 VERTIGO: ICD-10-CM

## 2023-03-28 PROCEDURE — G8484 FLU IMMUNIZE NO ADMIN: HCPCS | Performed by: NUCLEAR MEDICINE

## 2023-03-28 PROCEDURE — G8417 CALC BMI ABV UP PARAM F/U: HCPCS | Performed by: NUCLEAR MEDICINE

## 2023-03-28 PROCEDURE — G8428 CUR MEDS NOT DOCUMENT: HCPCS | Performed by: NUCLEAR MEDICINE

## 2023-03-28 PROCEDURE — 99214 OFFICE O/P EST MOD 30 MIN: CPT | Performed by: NUCLEAR MEDICINE

## 2023-03-28 PROCEDURE — 1036F TOBACCO NON-USER: CPT | Performed by: NUCLEAR MEDICINE

## 2023-03-28 PROCEDURE — 3077F SYST BP >= 140 MM HG: CPT | Performed by: NUCLEAR MEDICINE

## 2023-03-28 PROCEDURE — 3017F COLORECTAL CA SCREEN DOC REV: CPT | Performed by: NUCLEAR MEDICINE

## 2023-03-28 PROCEDURE — 3079F DIAST BP 80-89 MM HG: CPT | Performed by: NUCLEAR MEDICINE

## 2023-03-28 PROCEDURE — 1123F ACP DISCUSS/DSCN MKR DOCD: CPT | Performed by: NUCLEAR MEDICINE

## 2023-03-28 RX ORDER — MECLIZINE HCL 12.5 MG/1
12.5 TABLET ORAL 3 TIMES DAILY PRN
COMMUNITY

## 2023-03-28 NOTE — PROGRESS NOTES
Out       Subjective:  General:   No fever, no chills, some fatigue or weight loss  Pulmonary:    some dyspnea, no wheezing  Cardiac:    Denies recent chest pain,   GI:     No nausea or vomiting, no abdominal pain  Neuro:     No dizziness or light headedness,   Musculoskeletal:  No recent active issues  Extremities:   No edema, no obvious claudication       Objective:  General:   Well developed, well nourished  Lungs:    Clear to auscultation  Heart:    Normal S1 S2, Slight murmur. no rubs, no gallops  Abdomen:   Soft, non tender, no organomegalies, positive bowel sounds  Extremities:   No edema, no cyanosis, good peripheral pulses  Neurological:   Awake, alert, oriented. No obvious focal deficits  Musculoskelatal:  No obvious deformities   BP (!) 176/83   Pulse 76   Ht 6' (1.829 m)   Wt (!) 301 lb (136.5 kg)   BMI 40.82 kg/m²     Assessment:      Diagnosis Orders   1. Primary hypertension        2. PVC (premature ventricular contraction)        3. Familial hypercholesterolemia        Likely vertigo   Vs stroke   Less likely cardiac issue     Plan:  No follow-ups on file. As above  Refer to ENT  Vs neurology   Continue risk factor modification and medical management  Thank you for allowing me to participate in the care of your patient. Please don't hesitate to contact me regarding any further issues related to the patient care    Orders Placed:  No orders of the defined types were placed in this encounter. Prescribed:  No orders of the defined types were placed in this encounter. Discussed use, benefit, and side effects of prescribed medications. All patient questions answered. Pt voicedunderstanding. Instructed to continue current medications, diet and exercise. Continue risk factor modification and medical management. Patient agreed with treatment plan. Follow up as directed.     Electronically signedby Shelli Rogers MD on 3/28/2023 at 8:02 AM

## 2023-05-10 LAB — PROSTATE SPECIFIC ANTIGEN: NORMAL

## 2023-05-17 ENCOUNTER — OFFICE VISIT (OUTPATIENT)
Dept: UROLOGY | Age: 73
End: 2023-05-17
Payer: MEDICARE

## 2023-05-17 VITALS
BODY MASS INDEX: 40.23 KG/M2 | DIASTOLIC BLOOD PRESSURE: 82 MMHG | WEIGHT: 297 LBS | SYSTOLIC BLOOD PRESSURE: 122 MMHG | HEIGHT: 72 IN

## 2023-05-17 DIAGNOSIS — R39.15 URGENCY OF URINATION: Primary | ICD-10-CM

## 2023-05-17 LAB
BILIRUBIN, POC: NORMAL
BLOOD URINE, POC: NORMAL
CLARITY, POC: CLEAR
COLOR, POC: YELLOW
GLUCOSE URINE, POC: NORMAL
KETONES, POC: NORMAL
LEUKOCYTE EST, POC: NORMAL
NITRITE, POC: NORMAL
PH, POC: 7.5
POST VOID RESIDUAL (PVR): 17 ML
PROTEIN, POC: NORMAL
SPECIFIC GRAVITY, POC: 1.02
UROBILINOGEN, POC: 0.2

## 2023-05-17 PROCEDURE — 1036F TOBACCO NON-USER: CPT | Performed by: NURSE PRACTITIONER

## 2023-05-17 PROCEDURE — 81003 URINALYSIS AUTO W/O SCOPE: CPT | Performed by: NURSE PRACTITIONER

## 2023-05-17 PROCEDURE — 51798 US URINE CAPACITY MEASURE: CPT | Performed by: NURSE PRACTITIONER

## 2023-05-17 PROCEDURE — 3074F SYST BP LT 130 MM HG: CPT | Performed by: NURSE PRACTITIONER

## 2023-05-17 PROCEDURE — G8427 DOCREV CUR MEDS BY ELIG CLIN: HCPCS | Performed by: NURSE PRACTITIONER

## 2023-05-17 PROCEDURE — 3017F COLORECTAL CA SCREEN DOC REV: CPT | Performed by: NURSE PRACTITIONER

## 2023-05-17 PROCEDURE — G8417 CALC BMI ABV UP PARAM F/U: HCPCS | Performed by: NURSE PRACTITIONER

## 2023-05-17 PROCEDURE — 1123F ACP DISCUSS/DSCN MKR DOCD: CPT | Performed by: NURSE PRACTITIONER

## 2023-05-17 PROCEDURE — 3079F DIAST BP 80-89 MM HG: CPT | Performed by: NURSE PRACTITIONER

## 2023-05-17 PROCEDURE — 99214 OFFICE O/P EST MOD 30 MIN: CPT | Performed by: NURSE PRACTITIONER

## 2023-05-17 RX ORDER — TROSPIUM CHLORIDE 20 MG/1
20 TABLET, FILM COATED ORAL 2 TIMES DAILY
Qty: 180 TABLET | Refills: 3 | Status: SHIPPED | OUTPATIENT
Start: 2023-05-17

## 2023-05-17 ASSESSMENT — ENCOUNTER SYMPTOMS
BACK PAIN: 0
ABDOMINAL PAIN: 0
NAUSEA: 0
VOMITING: 0

## 2023-05-17 NOTE — PROGRESS NOTES
[address] [address]  Dept: [phone]  Loc: [phone]    Visit Date: 5/17/2023        HPI:     Chase Clark is a 81 y.o. male who presents today for:  Chief Complaint   Patient presents with    Urinary Frequency    Prostate Cancer       HPI    Pt seen in follow up for urinary frequency, urgency, dysuria. Pt has a hx of prostate cancer and bladder neck contracture. He underwent RALRP 12/2008. [place] 3+4=7 [id number] prostate cancer. He underwent TURBN for bladder neck contracture 3/2017. Also has a hx of kidney stones, ED and decreased libido. Pt was started on Trospium in May 2021 for urinary frequency and urgency with improvement in symptoms. Reported discomfort at the end of the penis and increased urinary frequency and discomfort with urination. Urine culture 9/20/21 with a small amount of Morganella morganii treated with antibiotics. Symptoms continued despite treatment with antibiotics and underwent cystoscopy 10/1/21 by Dr. Jorje Robb showing no abnormalities of the urethra or bladder neck contracture, some mild bladder wall trabeculation. Today Ashok reports his trospium was changed to oxybutynin secondary to price per another provider. Notes he stopped the the oxybutynin as he thought it might be causing dizziness but now knows it was vertigo and is undergoing therapy with improvement. Reports he may be interested in treatment for ED at next appt.     Current Outpatient Medications   Medication Sig Dispense Refill    meclizine (ANTIVERT) 12.5 MG tablet Take 1 tablet by mouth 3 times daily as needed      ELDERBERRY PO Take by mouth Gummies 50mg daily  eldderberry 50mg, vit c 45mg, zinc 3.8mg      Potassium (POTASSIMIN) 75 MG TABS Take by mouth      aspirin 81 MG EC tablet Take 1 tablet by mouth daily 30 tablet 3    atorvastatin (LIPITOR) 80 MG tablet Take 1 tablet by mouth nightly

## 2023-07-04 ENCOUNTER — APPOINTMENT (OUTPATIENT)
Dept: CT IMAGING | Age: 73
DRG: 813 | End: 2023-07-04
Attending: STUDENT IN AN ORGANIZED HEALTH CARE EDUCATION/TRAINING PROGRAM
Payer: MEDICARE

## 2023-07-04 ENCOUNTER — HOSPITAL ENCOUNTER (INPATIENT)
Age: 73
LOS: 2 days | Discharge: HOME | DRG: 813 | End: 2023-07-06
Attending: STUDENT IN AN ORGANIZED HEALTH CARE EDUCATION/TRAINING PROGRAM | Admitting: STUDENT IN AN ORGANIZED HEALTH CARE EDUCATION/TRAINING PROGRAM
Payer: MEDICARE

## 2023-07-04 DIAGNOSIS — D69.6 THROMBOCYTOPENIA (HCC): Primary | ICD-10-CM

## 2023-07-04 LAB
ABO GROUP BLD: NORMAL
ALBUMIN SERPL BCG-MCNC: 3.8 G/DL (ref 3.5–5.1)
ALP SERPL-CCNC: 24 U/L (ref 38–126)
ALT SERPL W/O P-5'-P-CCNC: 21 U/L (ref 11–66)
ANION GAP SERPL CALC-SCNC: 12 MEQ/L (ref 8–16)
ANISOCYTOSIS BLD QL SMEAR: PRESENT
AST SERPL-CCNC: 19 U/L (ref 5–40)
BASOPHILS ABSOLUTE: 0 THOU/MM3 (ref 0–0.1)
BASOPHILS NFR BLD AUTO: 0.9 %
BILIRUB SERPL-MCNC: 0.5 MG/DL (ref 0.3–1.2)
BUN SERPL-MCNC: 32 MG/DL (ref 7–22)
CALCIUM SERPL-MCNC: 9.3 MG/DL (ref 8.5–10.5)
CHLORIDE SERPL-SCNC: 106 MEQ/L (ref 98–111)
CO2 SERPL-SCNC: 20 MEQ/L (ref 23–33)
CREAT SERPL-MCNC: 1 MG/DL (ref 0.4–1.2)
CRP SERPL-MCNC: < 0.3 MG/DL (ref 0–1)
D DIMER PPP IA.FEU-MCNC: 435 NG/ML FEU (ref 0–500)
DAT POLY-SP REAG RBC QL: NORMAL
DEPRECATED RDW RBC AUTO: 43.2 FL (ref 35–45)
EKG ATRIAL RATE: 77 BPM
EKG P AXIS: 32 DEGREES
EKG P-R INTERVAL: 166 MS
EKG Q-T INTERVAL: 382 MS
EKG QRS DURATION: 92 MS
EKG QTC CALCULATION (BAZETT): 432 MS
EKG R AXIS: 0 DEGREES
EKG T AXIS: -25 DEGREES
EKG VENTRICULAR RATE: 77 BPM
EOSINOPHIL NFR BLD AUTO: 2.5 %
EOSINOPHILS ABSOLUTE: 0.1 THOU/MM3 (ref 0–0.4)
ERYTHROCYTE [DISTWIDTH] IN BLOOD BY AUTOMATED COUNT: 11.8 % (ref 11.5–14.5)
ERYTHROCYTE [SEDIMENTATION RATE] IN BLOOD BY WESTERGREN METHOD: 18 MM/HR (ref 0–10)
FIBRINOGEN PPP-MCNC: 292 MG/100ML (ref 155–475)
FOLATE SERPL-MCNC: 17.3 NG/ML (ref 4.8–24.2)
GFR SERPL CREATININE-BSD FRML MDRD: > 60 ML/MIN/1.73M2
GLUCOSE BLD STRIP.AUTO-MCNC: 130 MG/DL (ref 70–108)
GLUCOSE BLD STRIP.AUTO-MCNC: 148 MG/DL (ref 70–108)
GLUCOSE BLD STRIP.AUTO-MCNC: 201 MG/DL (ref 70–108)
GLUCOSE BLD STRIP.AUTO-MCNC: 279 MG/DL (ref 70–108)
GLUCOSE SERPL-MCNC: 144 MG/DL (ref 70–108)
HCT VFR BLD AUTO: 39.7 % (ref 42–52)
HGB BLD-MCNC: 13.1 GM/DL (ref 14–18)
HGB RETIC QN AUTO: 37.4 PG (ref 28.2–35.7)
IMM GRANULOCYTES # BLD AUTO: 0.07 THOU/MM3 (ref 0–0.07)
IMM GRANULOCYTES NFR BLD AUTO: 1.3 %
IMM RETICS NFR: 19.5 % (ref 2.3–13.4)
INR PPP: 0.95 (ref 0.85–1.13)
LACTATE SERPL-SCNC: 1.7 MMOL/L (ref 0.5–2)
LDH SERPL L TO P-CCNC: 211 U/L (ref 100–190)
LYMPHOCYTES ABSOLUTE: 1.7 THOU/MM3 (ref 1–4.8)
LYMPHOCYTES NFR BLD AUTO: 31.6 %
MCH RBC QN AUTO: 33.3 PG (ref 26–33)
MCHC RBC AUTO-ENTMCNC: 33 GM/DL (ref 32.2–35.5)
MCV RBC AUTO: 101 FL (ref 80–94)
MONOCYTES ABSOLUTE: 0.7 THOU/MM3 (ref 0.4–1.3)
MONOCYTES NFR BLD AUTO: 12.5 %
NEUTROPHILS NFR BLD AUTO: 51.2 %
NRBC BLD AUTO-RTO: 0 /100 WBC
PH BLDV: 7.42 [PH] (ref 7.31–7.41)
PLATELET # BLD AUTO: 1 THOU/MM3 (ref 130–400)
PLATELET BLD QL SMEAR: ABNORMAL
PMV BLD AUTO: ABNORMAL FL (ref 9.4–12.4)
POTASSIUM SERPL-SCNC: 4.4 MEQ/L (ref 3.5–5.2)
PROT SERPL-MCNC: 6.5 G/DL (ref 6.1–8)
RBC # BLD AUTO: 3.93 MILL/MM3 (ref 4.7–6.1)
RETICS # AUTO: 99 THOU/MM3 (ref 20–115)
RETICS/RBC NFR AUTO: 2.5 % (ref 0.5–2)
REVIEWED BY: NORMAL
RH BLD: NORMAL
SCAN OF BLOOD SMEAR: NORMAL
SEGMENTED NEUTROPHILS ABSOLUTE COUNT: 2.8 THOU/MM3 (ref 1.8–7.7)
SMEAR REVIEW: NORMAL
SODIUM SERPL-SCNC: 138 MEQ/L (ref 135–145)
VARIANT LYMPHS BLD QL SMEAR: ABNORMAL %
VIT B12 SERPL-MCNC: 218 PG/ML (ref 211–911)
WBC # BLD AUTO: 5.5 THOU/MM3 (ref 4.8–10.8)

## 2023-07-04 PROCEDURE — 6360000002 HC RX W HCPCS: Performed by: INTERNAL MEDICINE

## 2023-07-04 PROCEDURE — 6370000000 HC RX 637 (ALT 250 FOR IP): Performed by: INTERNAL MEDICINE

## 2023-07-04 PROCEDURE — 36415 COLL VENOUS BLD VENIPUNCTURE: CPT

## 2023-07-04 PROCEDURE — 99233 SBSQ HOSP IP/OBS HIGH 50: CPT | Performed by: INTERNAL MEDICINE

## 2023-07-04 PROCEDURE — 85384 FIBRINOGEN ACTIVITY: CPT

## 2023-07-04 PROCEDURE — 85046 RETICYTE/HGB CONCENTRATE: CPT

## 2023-07-04 PROCEDURE — 85651 RBC SED RATE NONAUTOMATED: CPT

## 2023-07-04 PROCEDURE — 6370000000 HC RX 637 (ALT 250 FOR IP)

## 2023-07-04 PROCEDURE — 83605 ASSAY OF LACTIC ACID: CPT

## 2023-07-04 PROCEDURE — 93005 ELECTROCARDIOGRAM TRACING: CPT | Performed by: STUDENT IN AN ORGANIZED HEALTH CARE EDUCATION/TRAINING PROGRAM

## 2023-07-04 PROCEDURE — 86880 COOMBS TEST DIRECT: CPT

## 2023-07-04 PROCEDURE — 86900 BLOOD TYPING SEROLOGIC ABO: CPT

## 2023-07-04 PROCEDURE — 82800 BLOOD PH: CPT

## 2023-07-04 PROCEDURE — 86038 ANTINUCLEAR ANTIBODIES: CPT

## 2023-07-04 PROCEDURE — 85610 PROTHROMBIN TIME: CPT

## 2023-07-04 PROCEDURE — 82746 ASSAY OF FOLIC ACID SERUM: CPT

## 2023-07-04 PROCEDURE — 83615 LACTATE (LD) (LDH) ENZYME: CPT

## 2023-07-04 PROCEDURE — 80053 COMPREHEN METABOLIC PANEL: CPT

## 2023-07-04 PROCEDURE — 82948 REAGENT STRIP/BLOOD GLUCOSE: CPT

## 2023-07-04 PROCEDURE — 82607 VITAMIN B-12: CPT

## 2023-07-04 PROCEDURE — 85379 FIBRIN DEGRADATION QUANT: CPT

## 2023-07-04 PROCEDURE — 86901 BLOOD TYPING SEROLOGIC RH(D): CPT

## 2023-07-04 PROCEDURE — 86140 C-REACTIVE PROTEIN: CPT

## 2023-07-04 PROCEDURE — 93010 ELECTROCARDIOGRAM REPORT: CPT | Performed by: INTERNAL MEDICINE

## 2023-07-04 PROCEDURE — 85025 COMPLETE CBC W/AUTO DIFF WBC: CPT

## 2023-07-04 PROCEDURE — 2060000000 HC ICU INTERMEDIATE R&B

## 2023-07-04 PROCEDURE — 2580000003 HC RX 258: Performed by: INTERNAL MEDICINE

## 2023-07-04 PROCEDURE — 83010 ASSAY OF HAPTOGLOBIN QUANT: CPT

## 2023-07-04 RX ORDER — ATORVASTATIN CALCIUM 80 MG/1
80 TABLET, FILM COATED ORAL NIGHTLY
Status: DISCONTINUED | OUTPATIENT
Start: 2023-07-04 | End: 2023-07-06 | Stop reason: HOSPADM

## 2023-07-04 RX ORDER — ACETAMINOPHEN 325 MG/1
650 TABLET ORAL EVERY 6 HOURS PRN
Status: DISCONTINUED | OUTPATIENT
Start: 2023-07-04 | End: 2023-07-06 | Stop reason: HOSPADM

## 2023-07-04 RX ORDER — LISINOPRIL 40 MG/1
40 TABLET ORAL DAILY
Status: DISCONTINUED | OUTPATIENT
Start: 2023-07-04 | End: 2023-07-06 | Stop reason: HOSPADM

## 2023-07-04 RX ORDER — MECLIZINE HCL 12.5 MG/1
12.5 TABLET ORAL 3 TIMES DAILY PRN
Status: DISCONTINUED | OUTPATIENT
Start: 2023-07-04 | End: 2023-07-06 | Stop reason: HOSPADM

## 2023-07-04 RX ORDER — INSULIN LISPRO 100 [IU]/ML
0-4 INJECTION, SOLUTION INTRAVENOUS; SUBCUTANEOUS NIGHTLY
Status: DISCONTINUED | OUTPATIENT
Start: 2023-07-04 | End: 2023-07-06 | Stop reason: HOSPADM

## 2023-07-04 RX ORDER — METOPROLOL SUCCINATE 25 MG/1
25 TABLET, EXTENDED RELEASE ORAL 2 TIMES DAILY
Status: DISCONTINUED | OUTPATIENT
Start: 2023-07-04 | End: 2023-07-04

## 2023-07-04 RX ORDER — FENOFIBRATE 160 MG/1
160 TABLET ORAL DAILY
Status: DISCONTINUED | OUTPATIENT
Start: 2023-07-04 | End: 2023-07-06 | Stop reason: HOSPADM

## 2023-07-04 RX ORDER — LEVOTHYROXINE SODIUM 0.03 MG/1
25 TABLET ORAL DAILY
Status: DISCONTINUED | OUTPATIENT
Start: 2023-07-04 | End: 2023-07-06 | Stop reason: HOSPADM

## 2023-07-04 RX ORDER — MULTIVITAMIN WITH IRON
1 TABLET ORAL DAILY
Status: DISCONTINUED | OUTPATIENT
Start: 2023-07-04 | End: 2023-07-06 | Stop reason: HOSPADM

## 2023-07-04 RX ORDER — TROSPIUM CHLORIDE 20 MG/1
20 TABLET, FILM COATED ORAL NIGHTLY
Status: DISCONTINUED | OUTPATIENT
Start: 2023-07-04 | End: 2023-07-06 | Stop reason: HOSPADM

## 2023-07-04 RX ORDER — DEXTROSE MONOHYDRATE 100 MG/ML
INJECTION, SOLUTION INTRAVENOUS CONTINUOUS PRN
Status: DISCONTINUED | OUTPATIENT
Start: 2023-07-04 | End: 2023-07-06 | Stop reason: HOSPADM

## 2023-07-04 RX ORDER — INSULIN LISPRO 100 [IU]/ML
0-4 INJECTION, SOLUTION INTRAVENOUS; SUBCUTANEOUS
Status: DISCONTINUED | OUTPATIENT
Start: 2023-07-04 | End: 2023-07-06 | Stop reason: HOSPADM

## 2023-07-04 RX ORDER — VITAMIN B COMPLEX
2000 TABLET ORAL DAILY
Status: DISCONTINUED | OUTPATIENT
Start: 2023-07-04 | End: 2023-07-06 | Stop reason: HOSPADM

## 2023-07-04 RX ORDER — LISINOPRIL 20 MG/1
20 TABLET ORAL DAILY
Status: DISCONTINUED | OUTPATIENT
Start: 2023-07-04 | End: 2023-07-04

## 2023-07-04 RX ORDER — FUROSEMIDE 20 MG/1
20 TABLET ORAL
Status: DISCONTINUED | OUTPATIENT
Start: 2023-07-05 | End: 2023-07-06 | Stop reason: HOSPADM

## 2023-07-04 RX ORDER — ASPIRIN 81 MG/1
81 TABLET ORAL DAILY
Status: DISCONTINUED | OUTPATIENT
Start: 2023-07-04 | End: 2023-07-06 | Stop reason: HOSPADM

## 2023-07-04 RX ORDER — ACETAMINOPHEN 650 MG/1
650 SUPPOSITORY RECTAL EVERY 6 HOURS PRN
Status: DISCONTINUED | OUTPATIENT
Start: 2023-07-04 | End: 2023-07-06 | Stop reason: HOSPADM

## 2023-07-04 RX ORDER — FUROSEMIDE 20 MG/1
20 TABLET ORAL
Status: DISCONTINUED | OUTPATIENT
Start: 2023-07-04 | End: 2023-07-04

## 2023-07-04 RX ADMIN — METOPROLOL TARTRATE 25 MG: 25 TABLET, FILM COATED ORAL at 11:45

## 2023-07-04 RX ADMIN — FENOFIBRATE 160 MG: 160 TABLET ORAL at 10:11

## 2023-07-04 RX ADMIN — LEVOTHYROXINE SODIUM 25 MCG: 0.03 TABLET ORAL at 10:12

## 2023-07-04 RX ADMIN — SODIUM CHLORIDE 500 MG: 9 INJECTION, SOLUTION INTRAVENOUS at 10:11

## 2023-07-04 RX ADMIN — Medication 1 TABLET: at 10:11

## 2023-07-04 RX ADMIN — ATORVASTATIN CALCIUM 80 MG: 80 TABLET, FILM COATED ORAL at 20:39

## 2023-07-04 RX ADMIN — LISINOPRIL 40 MG: 40 TABLET ORAL at 11:45

## 2023-07-04 RX ADMIN — TROSPIUM CHLORIDE 20 MG: 20 TABLET, FILM COATED ORAL at 20:39

## 2023-07-04 RX ADMIN — METOPROLOL TARTRATE 25 MG: 25 TABLET, FILM COATED ORAL at 20:39

## 2023-07-04 RX ADMIN — Medication 2000 UNITS: at 10:11

## 2023-07-04 NOTE — FLOWSHEET NOTE
07/04/23 1136   Safe Environment   Safety Measures Call light within reach; Family at bedside; Fall prevention (comment); Side rails X 2;Standard Safety Measures  (Virtual nurse safety round completed.)      Patient is awake and  alert and answers questions. Referred patient to page 13 of the handbook for fall prevention review. Call light in reach.

## 2023-07-04 NOTE — FLOWSHEET NOTE
07/04/23 1283   Safe Environment   Safety Measures Standard Safety Measures;Call light within reach  (Virtual nurse safety round completed.)       Virtual nurse safety round completed.

## 2023-07-04 NOTE — PLAN OF CARE
Problem: Discharge Planning  Goal: Discharge to home or other facility with appropriate resources  Outcome: Progressing  Flowsheets (Taken 7/4/2023 1510)  Discharge to home or other facility with appropriate resources:   Identify barriers to discharge with patient and caregiver   Arrange for needed discharge resources and transportation as appropriate   Refer to discharge planning if patient needs post-hospital services based on physician order or complex needs related to functional status, cognitive ability or social support system   Arrange for interpreters to assist at discharge as needed   Identify discharge learning needs (meds, wound care, etc)     Problem: Safety - Adult  Goal: Free from fall injury  Outcome: Progressing  Note: Patient remained free from falls this shift. Bed is in low position with alarm on and siderails up x2. Patient ambulates with one assist. Education given on use of call light and patient voiced understanding. Patient uses call light appropriately. Call light and beside table within reach. Arm band and falling star in place. Problem: ABCDS Injury Assessment  Goal: Absence of physical injury  Outcome: Progressing  Flowsheets (Taken 7/4/2023 1510)  Absence of Physical Injury: Implement safety measures based on patient assessment     Problem: Skin/Tissue Integrity  Goal: Absence of new skin breakdown  Description: 1. Monitor for areas of redness and/or skin breakdown  2. Assess vascular access sites hourly  3. Every 4-6 hours minimum:  Change oxygen saturation probe site  4. Every 4-6 hours:  If on nasal continuous positive airway pressure, respiratory therapy assess nares and determine need for appliance change or resting period. Outcome: Progressing  Note: No signs of new skin breakdown noted with each assessment this shift. Skin warm, dry, and intact except where otherwise noted in head-to-toe assessment.  Mucous membranes pink and moist. Assistance with turns/ambulation

## 2023-07-04 NOTE — H&P
Hospitalist History & Physical    Patient:  Gemma Ellis    Unit/Bed:4A-16/016-A  YOB: 1950  MRN: [id number]   Acct: [de-identified]   PCP: Stu SPENCER  Code Status: Prior    Date of Service: Pt seen/examined on 07/04/23 and admitted to Inpatient with expected LOS greater than two midnights due to medical therapy. 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 Patient has scattered petechiae with oral blisters that patient reports intermittently bleeds. Possible purpura. Platelet count down to 1K on admission. No signs of bleeding. Hgb 13.1. Immature retic fraction 19.5, retic Ct abs 2.5, Absolute retic count 99 and retic hemoglobin 37.4. Coags wnl. Direct antiglobulin test pending. Peripheral blood smear pending. Consult hematology/oncology for further recommendations. Primary HTN: Blood pressure stable on admission. Continue home lasix, lisinopril, and metoprolol with hold parameters. NIDDM2: Hold home Actos and Metformin. Stop Glucotrust.    [place]ms ACHS  LDSSI with hypoglycemia protocol  A1c    DELMER: Wears home CPAP. Hx of prostate cancer: s/p prostatectomy in 2008. Follows with urology outpatient. History of Present Illness:  Gemma Ellis is a 81 y.o. male with PMHx of HTN, NIDDM2, DELMER, and prostate cancer who presented to [facility] with chief complaint of oral lesions. The patient reports he started taking this medication called Glucotrust that he found on an ad on TV. He has been taking this for approximately 2-3 weeks. He states that 3 days ago, he was eating dinner and he burned his mouth on his food. He developed a dark blister. The blisters then began to spread throughout his mouth and would intermittently bleed. His wife reports that he then developed a rash scattered throughout his entire body.   She noticed it yesterday

## 2023-07-05 LAB
ANION GAP SERPL CALC-SCNC: 15 MEQ/L (ref 8–16)
ANISOCYTOSIS BLD QL SMEAR: PRESENT
BASOPHILS ABSOLUTE: 0 THOU/MM3 (ref 0–0.1)
BASOPHILS NFR BLD AUTO: 0.2 %
BUN SERPL-MCNC: 29 MG/DL (ref 7–22)
CALCIUM SERPL-MCNC: 8.9 MG/DL (ref 8.5–10.5)
CHLORIDE SERPL-SCNC: 101 MEQ/L (ref 98–111)
CO2 SERPL-SCNC: 20 MEQ/L (ref 23–33)
CREAT SERPL-MCNC: 0.9 MG/DL (ref 0.4–1.2)
DEPRECATED RDW RBC AUTO: 41.8 FL (ref 35–45)
EOSINOPHIL NFR BLD AUTO: 0 %
EOSINOPHILS ABSOLUTE: 0 THOU/MM3 (ref 0–0.4)
ERYTHROCYTE [DISTWIDTH] IN BLOOD BY AUTOMATED COUNT: 11.5 % (ref 11.5–14.5)
GFR SERPL CREATININE-BSD FRML MDRD: > 60 ML/MIN/1.73M2
GLUCOSE BLD STRIP.AUTO-MCNC: 204 MG/DL (ref 70–108)
GLUCOSE BLD STRIP.AUTO-MCNC: 208 MG/DL (ref 70–108)
GLUCOSE BLD STRIP.AUTO-MCNC: 295 MG/DL (ref 70–108)
GLUCOSE BLD STRIP.AUTO-MCNC: 323 MG/DL (ref 70–108)
GLUCOSE SERPL-MCNC: 226 MG/DL (ref 70–108)
HCT VFR BLD AUTO: 38.3 % (ref 42–52)
HGB BLD-MCNC: 13 GM/DL (ref 14–18)
IMM GRANULOCYTES # BLD AUTO: 0.09 THOU/MM3 (ref 0–0.07)
IMM GRANULOCYTES NFR BLD AUTO: 1 %
LYMPHOCYTES ABSOLUTE: 0.9 THOU/MM3 (ref 1–4.8)
LYMPHOCYTES NFR BLD AUTO: 9.5 %
MCH RBC QN AUTO: 33.9 PG (ref 26–33)
MCHC RBC AUTO-ENTMCNC: 33.9 GM/DL (ref 32.2–35.5)
MCV RBC AUTO: 100 FL (ref 80–94)
MONOCYTES ABSOLUTE: 0.5 THOU/MM3 (ref 0.4–1.3)
MONOCYTES NFR BLD AUTO: 5.5 %
NEUTROPHILS NFR BLD AUTO: 83.8 %
NRBC BLD AUTO-RTO: 0 /100 WBC
PLATELET # BLD AUTO: 20 THOU/MM3 (ref 130–400)
PLATELET # BLD AUTO: 8 THOU/MM3 (ref 130–400)
PLATELET BLD QL SMEAR: ABNORMAL
PMV BLD AUTO: ABNORMAL FL (ref 9.4–12.4)
POTASSIUM SERPL-SCNC: 4.7 MEQ/L (ref 3.5–5.2)
RBC # BLD AUTO: 3.83 MILL/MM3 (ref 4.7–6.1)
SCAN OF BLOOD SMEAR: NORMAL
SEGMENTED NEUTROPHILS ABSOLUTE COUNT: 7.8 THOU/MM3 (ref 1.8–7.7)
SODIUM SERPL-SCNC: 136 MEQ/L (ref 135–145)
WBC # BLD AUTO: 9.3 THOU/MM3 (ref 4.8–10.8)

## 2023-07-05 PROCEDURE — 6370000000 HC RX 637 (ALT 250 FOR IP)

## 2023-07-05 PROCEDURE — 2060000000 HC ICU INTERMEDIATE R&B

## 2023-07-05 PROCEDURE — 6360000002 HC RX W HCPCS: Performed by: INTERNAL MEDICINE

## 2023-07-05 PROCEDURE — 82948 REAGENT STRIP/BLOOD GLUCOSE: CPT

## 2023-07-05 PROCEDURE — 36415 COLL VENOUS BLD VENIPUNCTURE: CPT

## 2023-07-05 PROCEDURE — 85025 COMPLETE CBC W/AUTO DIFF WBC: CPT

## 2023-07-05 PROCEDURE — 2580000003 HC RX 258: Performed by: INTERNAL MEDICINE

## 2023-07-05 PROCEDURE — 6370000000 HC RX 637 (ALT 250 FOR IP): Performed by: INTERNAL MEDICINE

## 2023-07-05 PROCEDURE — 85049 AUTOMATED PLATELET COUNT: CPT

## 2023-07-05 PROCEDURE — 99233 SBSQ HOSP IP/OBS HIGH 50: CPT | Performed by: INTERNAL MEDICINE

## 2023-07-05 PROCEDURE — 80048 BASIC METABOLIC PNL TOTAL CA: CPT

## 2023-07-05 RX ADMIN — LEVOTHYROXINE SODIUM 25 MCG: 0.03 TABLET ORAL at 06:29

## 2023-07-05 RX ADMIN — TROSPIUM CHLORIDE 20 MG: 20 TABLET, FILM COATED ORAL at 19:36

## 2023-07-05 RX ADMIN — INSULIN LISPRO 1 UNITS: 100 INJECTION, SOLUTION INTRAVENOUS; SUBCUTANEOUS at 08:19

## 2023-07-05 RX ADMIN — INSULIN LISPRO 4 UNITS: 100 INJECTION, SOLUTION INTRAVENOUS; SUBCUTANEOUS at 19:39

## 2023-07-05 RX ADMIN — INSULIN LISPRO 1 UNITS: 100 INJECTION, SOLUTION INTRAVENOUS; SUBCUTANEOUS at 11:27

## 2023-07-05 RX ADMIN — METOPROLOL TARTRATE 25 MG: 25 TABLET, FILM COATED ORAL at 19:36

## 2023-07-05 RX ADMIN — SODIUM CHLORIDE 500 MG: 9 INJECTION, SOLUTION INTRAVENOUS at 09:19

## 2023-07-05 RX ADMIN — INSULIN LISPRO 2 UNITS: 100 INJECTION, SOLUTION INTRAVENOUS; SUBCUTANEOUS at 16:04

## 2023-07-05 RX ADMIN — FENOFIBRATE 160 MG: 160 TABLET ORAL at 09:20

## 2023-07-05 RX ADMIN — METOPROLOL TARTRATE 25 MG: 25 TABLET, FILM COATED ORAL at 09:21

## 2023-07-05 RX ADMIN — LISINOPRIL 40 MG: 40 TABLET ORAL at 09:20

## 2023-07-05 RX ADMIN — FUROSEMIDE 20 MG: 20 TABLET ORAL at 09:21

## 2023-07-05 RX ADMIN — Medication 2000 UNITS: at 09:20

## 2023-07-05 RX ADMIN — Medication 1 TABLET: at 09:21

## 2023-07-05 RX ADMIN — ATORVASTATIN CALCIUM 80 MG: 80 TABLET, FILM COATED ORAL at 19:36

## 2023-07-05 NOTE — FLOWSHEET NOTE
07/05/23 1359   Safe Environment   Safety Measures   (Virtual safety round completed)     Virtual RN called into patient room. Virtual RN receives consent to turn on camera. Patient observed out of bed to chair at time of virtual visit. Patient responds appropriately to virtual nurse when prompted. No acute distress noted. Patient denies having any immediate needs at this time. POC, Fall prevention, deep breathing education reviewed with patient at this time . Patient call light, bedside table, and phone within patient reach. Virtual to continue to round and educate as appropriate.

## 2023-07-05 NOTE — CARE COORDINATION
Case Management Assessment  Initial Evaluation    Date/Time of Evaluation: 7/5/2023 12:17 PM  Assessment Completed by: Pratibha Orta RN    If patient is discharged prior to next notation, then this note serves as note for discharge by case management. Patient Name: Raheem Hooper                   YOB: 1950  Diagnosis: Thrombocytopenia ([address]) [D69.6]                   Date / Time: 7/4/2023  4:38 AM  Location: [address] [place] [address]     Patient Admission Status: Inpatient   Readmission Risk Low 0-14, Mod 15-19), High > 20: Readmission Risk Score: 11.8    Current PCP: Jenni Waterman  PCP verified by CM? Yes    Chart Reviewed: Yes      History Provided by: Patient  Patient Orientation: Alert and Oriented    Patient Cognition: Alert    Hospitalization in the last 30 days (Readmission):  No    If yes, Readmission Assessment in CM Navigator will be completed. Advance Directives:      Code Status: Full Code   Patient's Primary Decision Maker is: Legal Next of Kin      Discharge Planning:    Patient lives with: Spouse/Significant Other Type of Home: House  Primary Care Giver: Self  Patient Support Systems include: Spouse/Significant Other   Current Financial resources: Medicare  Current community resources: None  Current services prior to admission: None            Current DME:              Type of Home Care services:  None    ADLS  Prior functional level: Independent in ADLs/IADLs  Current functional level: Independent in ADLs/IADLs    Family can provide assistance at DC: Yes  Would you like Case Management to discuss the discharge plan with any other family members/significant others, and if so, who?  No  Plans to Return to Present Housing: Yes  Other Identified Issues/Barriers to RETURNING to current housing: medical complications  Potential Assistance needed at discharge: N/A            Potential DME:    Patient expects to discharge to: [address] [place] [address] for transportation at discharge:

## 2023-07-05 NOTE — CONSULTS
[place] [address]                                  CONSULTATION    PATIENT NAME: Mikey Louise                   :        1950  MED REC NO:   [id number]                           ROOM:       [id number]  ACCOUNT NO:   [de-identified]                           ADMIT DATE: 2023  PROVIDER:     ANGIE Mena DATE:  2023    HISTORY OF PRESENT ILLNESS:  The patient is a 74-year-old gentleman who  is admitted with severe thrombocytopenia with a platelet count of 8883. The patient apparently started taking a new medicine called Glucofast,  he saw the add on TV. He has been taking the medicine for the last two  weeks. He started to notice three days back that he does have petechia  and blisters in his mouth. The patient is admitted to the hospital for  further management of his severe thrombocytopenia. The patient denies  any bleeding. PAST MEDICAL HISTORY:  1. Arthritis. 2.  History of kidney stones. 3.  Hypertension. 4.  Sleep apnea. 5.  Prostate cancer. 6.  Shingles  7. Thyroid disease. PAST SURGICAL HISTORY:  1. Hernia repair. 2.  Neck surgery. 3.  Prostate surgery. 4.  Tonsillectomy. HOME MEDICATIONS:  Please see medication list.    ALLERGIES:  HE IS ALLERGIC TO PENICILLIN. SOCIAL HISTORY:  He lives with his wife. He does not smoke. He drinks  alcohol socially. He is retired. FAMILY HISTORY:  Father had dementia. Brother, prostate cancer. REVIEW OF SYSTEMS:  Twelve systems were reviewed. Pertinent positive  findings were mentioned in the history of present illness. PHYSICAL EXAMINATION:  GENERAL:  He is a pleasant gentleman, sitting in bed, in no respiratory  distress. VITAL SIGNS:  Weight 294 pounds. Temperature is 97.7, blood pressure  149/84, pulse 77, respirations 18, oxygen saturation 98% on room air. HEENT:  Normocephalic, atraumatic. NECK:  Supple.   No

## 2023-07-06 VITALS
SYSTOLIC BLOOD PRESSURE: 91 MMHG | BODY MASS INDEX: 39.42 KG/M2 | HEIGHT: 72 IN | DIASTOLIC BLOOD PRESSURE: 69 MMHG | HEART RATE: 75 BPM | RESPIRATION RATE: 18 BRPM | TEMPERATURE: 97.9 F | WEIGHT: 291.01 LBS | OXYGEN SATURATION: 98 %

## 2023-07-06 LAB
DEPRECATED RDW RBC AUTO: 43 FL (ref 35–45)
ERYTHROCYTE [DISTWIDTH] IN BLOOD BY AUTOMATED COUNT: 11.7 % (ref 11.5–14.5)
GLUCOSE BLD STRIP.AUTO-MCNC: 241 MG/DL (ref 70–108)
GLUCOSE BLD STRIP.AUTO-MCNC: 262 MG/DL (ref 70–108)
HAPTOGLOB SERPL-MCNC: 160 MG/DL (ref 30–200)
HCT VFR BLD AUTO: 40 % (ref 42–52)
HGB BLD-MCNC: 13.3 GM/DL (ref 14–18)
MCH RBC QN AUTO: 33.7 PG (ref 26–33)
MCHC RBC AUTO-ENTMCNC: 33.3 GM/DL (ref 32.2–35.5)
MCV RBC AUTO: 101.3 FL (ref 80–94)
PLATELET # BLD AUTO: 39 THOU/MM3 (ref 130–400)
PMV BLD AUTO: 12.8 FL (ref 9.4–12.4)
RBC # BLD AUTO: 3.95 MILL/MM3 (ref 4.7–6.1)
WBC # BLD AUTO: 12.4 THOU/MM3 (ref 4.8–10.8)

## 2023-07-06 PROCEDURE — 82948 REAGENT STRIP/BLOOD GLUCOSE: CPT

## 2023-07-06 PROCEDURE — 2580000003 HC RX 258: Performed by: INTERNAL MEDICINE

## 2023-07-06 PROCEDURE — 6360000002 HC RX W HCPCS: Performed by: INTERNAL MEDICINE

## 2023-07-06 PROCEDURE — 85027 COMPLETE CBC AUTOMATED: CPT

## 2023-07-06 PROCEDURE — 99239 HOSP IP/OBS DSCHRG MGMT >30: CPT | Performed by: INTERNAL MEDICINE

## 2023-07-06 PROCEDURE — 6370000000 HC RX 637 (ALT 250 FOR IP)

## 2023-07-06 PROCEDURE — 36415 COLL VENOUS BLD VENIPUNCTURE: CPT

## 2023-07-06 RX ADMIN — SODIUM CHLORIDE 500 MG: 9 INJECTION, SOLUTION INTRAVENOUS at 08:08

## 2023-07-06 RX ADMIN — Medication 1 TABLET: at 08:07

## 2023-07-06 RX ADMIN — FENOFIBRATE 160 MG: 160 TABLET ORAL at 08:07

## 2023-07-06 RX ADMIN — INSULIN LISPRO 1 UNITS: 100 INJECTION, SOLUTION INTRAVENOUS; SUBCUTANEOUS at 08:06

## 2023-07-06 RX ADMIN — LEVOTHYROXINE SODIUM 25 MCG: 0.03 TABLET ORAL at 05:40

## 2023-07-06 RX ADMIN — Medication 2000 UNITS: at 08:07

## 2023-07-06 RX ADMIN — INSULIN LISPRO 2 UNITS: 100 INJECTION, SOLUTION INTRAVENOUS; SUBCUTANEOUS at 11:01

## 2023-07-06 NOTE — CARE COORDINATION
7/6/23, 11:31 AM EDT    Patient goals/plan/ treatment preferences discussed by  and . Patient goals/plan/ treatment preferences reviewed with patient/ family. Patient/ family verbalize understanding of discharge plan and are in agreement with goal/plan/treatment preferences. Understanding was demonstrated using the teach back method. AVS provided by RN at time of discharge, which includes all necessary medical information pertaining to the patients current course of illness, treatment, post-discharge goals of care, and treatment preferences. Services At/After Discharge: None  Pt to be discharged to home with spouse. He denies needs or services.         IMM Letter  IMM Letter given to Patient/Family/Significant other/Guardian/POA/by[de-identified] staff  IMM Letter date given[de-identified] 07/04/23  IMM Letter time given[de-identified] 6639

## 2023-07-06 NOTE — DISCHARGE SUMMARY
small bowel structures are unremarkable. No appendiceal abnormality is appreciated. No significant large bowel pathology is appreciated. Pelvic soft tissue structures are normal appearing. No acute osseous abnormality. Previous right L2-3 laminotomy procedure. Multilevel moderate degenerative changes. 1. No acute abnormality identified. No hemorrhage is detected. 2. Small gallstones are suspected. No biliary obstruction. 3. A nonobstructing lower pole left renal stone is noted. 4. Degenerative and operative changes of the lumbar spine. This document has been electronically signed by: Edis Edmondson MD on 07/04/2023 07:16 AM All CTs at this facility use dose modulation techniques and iterative reconstructions, and/or weight-based dosing when appropriate to reduce radiation to a low as reasonably achievable. Time Spent on discharge is more than 30 minutes in the examination, evaluation, counseling and review of medications and discharge plan. This note was dictated using M*Modal Fluency Direct so please excuse any grammatical or syntax errors as no guarantees can be provided that every mistake has been identified and corrected by editing.     Electronically signed by Jun Carlin MD  on 7/6/2023 at 11:05 AM

## 2023-07-06 NOTE — PLAN OF CARE
Problem: Discharge Planning  Goal: Discharge to home or other facility with appropriate resources  Outcome: Progressing  Discharge to home or other facility with appropriate resources:   Identify barriers to discharge with patient and caregiver   Arrange for needed discharge resources and transportation as appropriate   Refer to discharge planning if patient needs post-hospital services based on physician order or complex needs related to functional status, cognitive ability or social support system   Arrange for interpreters to assist at discharge as needed   Identify discharge learning needs (meds, wound care, etc)     Problem: Safety - Adult  Goal: Free from fall injury  Outcome: Progressing  Free From Fall Injury: Instruct family/caregiver on patient safety     Problem: Skin/Tissue Integrity  Goal: Absence of new skin breakdown  Description: 1. Monitor for areas of redness and/or skin breakdown  2. Assess vascular access sites hourly  3. Every 4-6 hours minimum:  Change oxygen saturation probe site  4. Every 4-6 hours:  If on nasal continuous positive airway pressure, respiratory therapy assess nares and determine need for appliance change or resting period. Outcome: Progressing  Note: No signs of skin breakdown. Skin warm, dry, and intact. Mucous membranes pink and moist.  Assistance with turns/ambulation provided PRN. Will continue to monitor.        Problem: Pain  Goal: Verbalizes/displays adequate comfort level or baseline comfort level  Outcome: Progressing  Verbalizes/displays adequate comfort level or baseline comfort level:   Encourage patient to monitor pain and request assistance   Assess pain using appropriate pain scale   Administer analgesics based on type and severity of pain and evaluate response   Implement non-pharmacological measures as appropriate and evaluate response   Consider cultural and social influences on pain and pain management   Notify Licensed Independent Practitioner if

## 2023-07-07 LAB — NUCLEAR IGG SER QL IA: NORMAL

## 2023-07-09 ENCOUNTER — HOSPITAL ENCOUNTER (INPATIENT)
Age: 73
LOS: 2 days | Discharge: HOME | DRG: 813 | End: 2023-07-13
Attending: EMERGENCY MEDICINE | Admitting: STUDENT IN AN ORGANIZED HEALTH CARE EDUCATION/TRAINING PROGRAM
Payer: MEDICARE

## 2023-07-09 ENCOUNTER — APPOINTMENT (OUTPATIENT)
Dept: CT IMAGING | Age: 73
DRG: 813 | End: 2023-07-09
Payer: MEDICARE

## 2023-07-09 DIAGNOSIS — R23.3 PETECHIAE: ICD-10-CM

## 2023-07-09 DIAGNOSIS — D69.6 THROMBOCYTOPENIA (HCC): Primary | ICD-10-CM

## 2023-07-09 PROBLEM — T50.905A DRUG-INDUCED ITP: Status: ACTIVE | Noted: 2023-07-09

## 2023-07-09 PROBLEM — D69.59 DRUG-INDUCED ITP: Status: ACTIVE | Noted: 2023-07-09

## 2023-07-09 LAB
ALBUMIN SERPL BCG-MCNC: 3.9 G/DL (ref 3.5–5.1)
ALP SERPL-CCNC: 29 U/L (ref 38–126)
ALT SERPL W/O P-5'-P-CCNC: 32 U/L (ref 11–66)
ANION GAP SERPL CALC-SCNC: 15 MEQ/L (ref 8–16)
AST SERPL-CCNC: 21 U/L (ref 5–40)
BILIRUB CONJ SERPL-MCNC: < 0.2 MG/DL (ref 0–0.3)
BILIRUB SERPL-MCNC: 0.5 MG/DL (ref 0.3–1.2)
BUN SERPL-MCNC: 33 MG/DL (ref 7–22)
CALCIUM SERPL-MCNC: 9.4 MG/DL (ref 8.5–10.5)
CHLORIDE SERPL-SCNC: 98 MEQ/L (ref 98–111)
CO2 SERPL-SCNC: 21 MEQ/L (ref 23–33)
CREAT SERPL-MCNC: 1.1 MG/DL (ref 0.4–1.2)
GFR SERPL CREATININE-BSD FRML MDRD: > 60 ML/MIN/1.73M2
GLUCOSE SERPL-MCNC: 158 MG/DL (ref 70–108)
HGB RETIC QN AUTO: 39.1 PG (ref 28.2–35.7)
IMM RETICS NFR: 11.5 % (ref 2.3–13.4)
OSMOLALITY SERPL CALC.SUM OF ELEC: 278.8 MOSMOL/KG (ref 275–300)
POTASSIUM SERPL-SCNC: 4.3 MEQ/L (ref 3.5–5.2)
PROT SERPL-MCNC: 6.1 G/DL (ref 6.1–8)
RETICS # AUTO: 84 THOU/MM3 (ref 20–115)
RETICS/RBC NFR AUTO: 2.1 % (ref 0.5–2)
SCAN OF BLOOD SMEAR: NORMAL
SODIUM SERPL-SCNC: 134 MEQ/L (ref 135–145)

## 2023-07-09 PROCEDURE — 99285 EMERGENCY DEPT VISIT HI MDM: CPT

## 2023-07-09 PROCEDURE — 93005 ELECTROCARDIOGRAM TRACING: CPT | Performed by: EMERGENCY MEDICINE

## 2023-07-09 PROCEDURE — 85730 THROMBOPLASTIN TIME PARTIAL: CPT

## 2023-07-09 PROCEDURE — 85046 RETICYTE/HGB CONCENTRATE: CPT

## 2023-07-09 PROCEDURE — 96374 THER/PROPH/DIAG INJ IV PUSH: CPT

## 2023-07-09 PROCEDURE — 85610 PROTHROMBIN TIME: CPT

## 2023-07-09 PROCEDURE — 36415 COLL VENOUS BLD VENIPUNCTURE: CPT

## 2023-07-09 PROCEDURE — 80053 COMPREHEN METABOLIC PANEL: CPT

## 2023-07-09 PROCEDURE — 99223 1ST HOSP IP/OBS HIGH 75: CPT | Performed by: STUDENT IN AN ORGANIZED HEALTH CARE EDUCATION/TRAINING PROGRAM

## 2023-07-09 PROCEDURE — 6360000002 HC RX W HCPCS: Performed by: EMERGENCY MEDICINE

## 2023-07-09 PROCEDURE — 70450 CT HEAD/BRAIN W/O DYE: CPT

## 2023-07-09 PROCEDURE — 2580000003 HC RX 258: Performed by: EMERGENCY MEDICINE

## 2023-07-09 PROCEDURE — 85025 COMPLETE CBC W/AUTO DIFF WBC: CPT

## 2023-07-09 PROCEDURE — 96365 THER/PROPH/DIAG IV INF INIT: CPT

## 2023-07-09 PROCEDURE — 82248 BILIRUBIN DIRECT: CPT

## 2023-07-09 RX ORDER — SODIUM CHLORIDE 9 MG/ML
INJECTION, SOLUTION INTRAVENOUS CONTINUOUS
Status: DISCONTINUED | OUTPATIENT
Start: 2023-07-09 | End: 2023-07-10

## 2023-07-09 RX ADMIN — SODIUM CHLORIDE: 9 INJECTION, SOLUTION INTRAVENOUS at 23:39

## 2023-07-09 RX ADMIN — SODIUM CHLORIDE 500 MG: 9 INJECTION, SOLUTION INTRAVENOUS at 23:41

## 2023-07-09 ASSESSMENT — PAIN - FUNCTIONAL ASSESSMENT: PAIN_FUNCTIONAL_ASSESSMENT: NONE - DENIES PAIN

## 2023-07-10 LAB
ANION GAP SERPL CALC-SCNC: 17 MEQ/L (ref 8–16)
ANISOCYTOSIS BLD QL SMEAR: PRESENT
APTT PPP: 27.8 SECONDS (ref 22–38)
AUTO DIFF PNL BLD: ABNORMAL
BASOPHILS ABSOLUTE: 0 THOU/MM3 (ref 0–0.1)
BASOPHILS ABSOLUTE: 0 THOU/MM3 (ref 0–0.1)
BASOPHILS NFR BLD AUTO: 0.5 %
BASOPHILS NFR BLD AUTO: 0.6 %
BUN SERPL-MCNC: 30 MG/DL (ref 7–22)
CALCIUM SERPL-MCNC: 8.8 MG/DL (ref 8.5–10.5)
CHLORIDE SERPL-SCNC: 103 MEQ/L (ref 98–111)
CO2 SERPL-SCNC: 17 MEQ/L (ref 23–33)
CREAT SERPL-MCNC: 1.1 MG/DL (ref 0.4–1.2)
CRP SERPL-MCNC: < 0.3 MG/DL (ref 0–1)
DEPRECATED MEAN GLUCOSE BLD GHB EST-ACNC: 147 MG/DL (ref 70–126)
DEPRECATED RDW RBC AUTO: 42.2 FL (ref 35–45)
DEPRECATED RDW RBC AUTO: 43.1 FL (ref 35–45)
EOSINOPHIL NFR BLD AUTO: 0.4 %
EOSINOPHIL NFR BLD AUTO: 3.4 %
EOSINOPHILS ABSOLUTE: 0 THOU/MM3 (ref 0–0.4)
EOSINOPHILS ABSOLUTE: 0.3 THOU/MM3 (ref 0–0.4)
ERYTHROCYTE [DISTWIDTH] IN BLOOD BY AUTOMATED COUNT: 11.8 % (ref 11.5–14.5)
ERYTHROCYTE [DISTWIDTH] IN BLOOD BY AUTOMATED COUNT: 11.8 % (ref 11.5–14.5)
ERYTHROCYTE [SEDIMENTATION RATE] IN BLOOD BY WESTERGREN METHOD: 7 MM/HR (ref 0–10)
FIBRINOGEN PPP-MCNC: 309 MG/100ML (ref 155–475)
FOLATE SERPL-MCNC: 16.6 NG/ML (ref 4.8–24.2)
GFR SERPL CREATININE-BSD FRML MDRD: > 60 ML/MIN/1.73M2
GLUCOSE BLD STRIP.AUTO-MCNC: 218 MG/DL (ref 70–108)
GLUCOSE BLD STRIP.AUTO-MCNC: 304 MG/DL (ref 70–108)
GLUCOSE BLD STRIP.AUTO-MCNC: 404 MG/DL (ref 70–108)
GLUCOSE BLD STRIP.AUTO-MCNC: 404 MG/DL (ref 70–108)
GLUCOSE BLD STRIP.AUTO-MCNC: 413 MG/DL (ref 70–108)
GLUCOSE SERPL-MCNC: 330 MG/DL (ref 70–108)
HBA1C MFR BLD HPLC: 6.9 % (ref 4.4–6.4)
HCT VFR BLD AUTO: 39.2 % (ref 42–52)
HCT VFR BLD AUTO: 40.1 % (ref 42–52)
HGB BLD-MCNC: 12.9 GM/DL (ref 14–18)
HGB BLD-MCNC: 13.7 GM/DL (ref 14–18)
IMM GRANULOCYTES # BLD AUTO: 0.12 THOU/MM3 (ref 0–0.07)
IMM GRANULOCYTES # BLD AUTO: 0.16 THOU/MM3 (ref 0–0.07)
IMM GRANULOCYTES NFR BLD AUTO: 1.9 %
IMM GRANULOCYTES NFR BLD AUTO: 2.2 %
INR PPP: 0.91 (ref 0.85–1.13)
LACTATE SERPL-SCNC: 3.5 MMOL/L (ref 0.5–2)
LACTATE SERPL-SCNC: 4.7 MMOL/L (ref 0.5–2)
LYMPHOCYTES ABSOLUTE: 0.6 THOU/MM3 (ref 1–4.8)
LYMPHOCYTES ABSOLUTE: 2.8 THOU/MM3 (ref 1–4.8)
LYMPHOCYTES NFR BLD AUTO: 12 %
LYMPHOCYTES NFR BLD AUTO: 33.5 %
MAGNESIUM SERPL-MCNC: 1.9 MG/DL (ref 1.6–2.4)
MCH RBC QN AUTO: 33.2 PG (ref 26–33)
MCH RBC QN AUTO: 33.5 PG (ref 26–33)
MCHC RBC AUTO-ENTMCNC: 32.9 GM/DL (ref 32.2–35.5)
MCHC RBC AUTO-ENTMCNC: 34.2 GM/DL (ref 32.2–35.5)
MCV RBC AUTO: 100.8 FL (ref 80–94)
MCV RBC AUTO: 98 FL (ref 80–94)
MONOCYTES ABSOLUTE: 0.1 THOU/MM3 (ref 0.4–1.3)
MONOCYTES ABSOLUTE: 0.8 THOU/MM3 (ref 0.4–1.3)
MONOCYTES NFR BLD AUTO: 1.8 %
MONOCYTES NFR BLD AUTO: 10.1 %
NEUTROPHILS NFR BLD AUTO: 50.6 %
NEUTROPHILS NFR BLD AUTO: 83 %
NRBC BLD AUTO-RTO: 0 /100 WBC
NRBC BLD AUTO-RTO: 0 /100 WBC
PATHOLOGIST REVIEW: ABNORMAL
PLATELET # BLD AUTO: 2 THOU/MM3 (ref 130–400)
PLATELET # BLD AUTO: 2 THOU/MM3 (ref 130–400)
PLATELET BLD QL SMEAR: ABNORMAL
PMV BLD AUTO: ABNORMAL FL (ref 9.4–12.4)
PMV BLD AUTO: ABNORMAL FL (ref 9.4–12.4)
POTASSIUM SERPL-SCNC: 5 MEQ/L (ref 3.5–5.2)
RBC # BLD AUTO: 3.89 MILL/MM3 (ref 4.7–6.1)
RBC # BLD AUTO: 4.09 MILL/MM3 (ref 4.7–6.1)
REVIEWED BY: NORMAL
SCAN OF BLOOD SMEAR: NORMAL
SEGMENTED NEUTROPHILS ABSOLUTE COUNT: 4.3 THOU/MM3 (ref 1.8–7.7)
SEGMENTED NEUTROPHILS ABSOLUTE COUNT: 4.5 THOU/MM3 (ref 1.8–7.7)
SMEAR REVIEW: NORMAL
SODIUM SERPL-SCNC: 137 MEQ/L (ref 135–145)
VIT B12 SERPL-MCNC: 269 PG/ML (ref 211–911)
WBC # BLD AUTO: 5.4 THOU/MM3 (ref 4.8–10.8)
WBC # BLD AUTO: 8.4 THOU/MM3 (ref 4.8–10.8)

## 2023-07-10 PROCEDURE — 99233 SBSQ HOSP IP/OBS HIGH 50: CPT | Performed by: STUDENT IN AN ORGANIZED HEALTH CARE EDUCATION/TRAINING PROGRAM

## 2023-07-10 PROCEDURE — 80048 BASIC METABOLIC PNL TOTAL CA: CPT

## 2023-07-10 PROCEDURE — 2580000003 HC RX 258: Performed by: STUDENT IN AN ORGANIZED HEALTH CARE EDUCATION/TRAINING PROGRAM

## 2023-07-10 PROCEDURE — 85025 COMPLETE CBC W/AUTO DIFF WBC: CPT

## 2023-07-10 PROCEDURE — 36415 COLL VENOUS BLD VENIPUNCTURE: CPT

## 2023-07-10 PROCEDURE — 6370000000 HC RX 637 (ALT 250 FOR IP)

## 2023-07-10 PROCEDURE — 83735 ASSAY OF MAGNESIUM: CPT

## 2023-07-10 PROCEDURE — 6370000000 HC RX 637 (ALT 250 FOR IP): Performed by: STUDENT IN AN ORGANIZED HEALTH CARE EDUCATION/TRAINING PROGRAM

## 2023-07-10 PROCEDURE — 96366 THER/PROPH/DIAG IV INF ADDON: CPT

## 2023-07-10 PROCEDURE — 85651 RBC SED RATE NONAUTOMATED: CPT

## 2023-07-10 PROCEDURE — 86140 C-REACTIVE PROTEIN: CPT

## 2023-07-10 PROCEDURE — 82948 REAGENT STRIP/BLOOD GLUCOSE: CPT

## 2023-07-10 PROCEDURE — 83036 HEMOGLOBIN GLYCOSYLATED A1C: CPT

## 2023-07-10 PROCEDURE — 85384 FIBRINOGEN ACTIVITY: CPT

## 2023-07-10 PROCEDURE — 96361 HYDRATE IV INFUSION ADD-ON: CPT

## 2023-07-10 PROCEDURE — G0378 HOSPITAL OBSERVATION PER HR: HCPCS

## 2023-07-10 PROCEDURE — 82746 ASSAY OF FOLIC ACID SERUM: CPT

## 2023-07-10 PROCEDURE — 93010 ELECTROCARDIOGRAM REPORT: CPT | Performed by: INTERNAL MEDICINE

## 2023-07-10 PROCEDURE — 82607 VITAMIN B-12: CPT

## 2023-07-10 PROCEDURE — 6360000002 HC RX W HCPCS: Performed by: STUDENT IN AN ORGANIZED HEALTH CARE EDUCATION/TRAINING PROGRAM

## 2023-07-10 PROCEDURE — 96367 TX/PROPH/DG ADDL SEQ IV INF: CPT

## 2023-07-10 PROCEDURE — 83605 ASSAY OF LACTIC ACID: CPT

## 2023-07-10 RX ORDER — SODIUM CHLORIDE 9 MG/ML
INJECTION, SOLUTION INTRAVENOUS PRN
Status: DISCONTINUED | OUTPATIENT
Start: 2023-07-10 | End: 2023-07-13 | Stop reason: HOSPADM

## 2023-07-10 RX ORDER — INSULIN LISPRO 100 [IU]/ML
5 INJECTION, SOLUTION INTRAVENOUS; SUBCUTANEOUS
Status: DISCONTINUED | OUTPATIENT
Start: 2023-07-11 | End: 2023-07-11

## 2023-07-10 RX ORDER — INSULIN GLARGINE 100 [IU]/ML
10 INJECTION, SOLUTION SUBCUTANEOUS DAILY
Status: DISCONTINUED | OUTPATIENT
Start: 2023-07-10 | End: 2023-07-10

## 2023-07-10 RX ORDER — TROSPIUM CHLORIDE 20 MG/1
20 TABLET, FILM COATED ORAL 2 TIMES DAILY
Status: DISCONTINUED | OUTPATIENT
Start: 2023-07-10 | End: 2023-07-13 | Stop reason: HOSPADM

## 2023-07-10 RX ORDER — ACETAMINOPHEN 650 MG/1
650 SUPPOSITORY RECTAL EVERY 6 HOURS PRN
Status: DISCONTINUED | OUTPATIENT
Start: 2023-07-10 | End: 2023-07-13 | Stop reason: HOSPADM

## 2023-07-10 RX ORDER — DEXTROSE MONOHYDRATE 100 MG/ML
INJECTION, SOLUTION INTRAVENOUS CONTINUOUS PRN
Status: DISCONTINUED | OUTPATIENT
Start: 2023-07-10 | End: 2023-07-13 | Stop reason: HOSPADM

## 2023-07-10 RX ORDER — FUROSEMIDE 20 MG/1
20 TABLET ORAL
Status: DISCONTINUED | OUTPATIENT
Start: 2023-07-11 | End: 2023-07-13 | Stop reason: HOSPADM

## 2023-07-10 RX ORDER — VITAMIN B COMPLEX
2000 TABLET ORAL DAILY
Status: DISCONTINUED | OUTPATIENT
Start: 2023-07-10 | End: 2023-07-13 | Stop reason: HOSPADM

## 2023-07-10 RX ORDER — INSULIN LISPRO 100 [IU]/ML
0-4 INJECTION, SOLUTION INTRAVENOUS; SUBCUTANEOUS NIGHTLY
Status: DISCONTINUED | OUTPATIENT
Start: 2023-07-10 | End: 2023-07-13 | Stop reason: HOSPADM

## 2023-07-10 RX ORDER — ONDANSETRON 2 MG/ML
4 INJECTION INTRAMUSCULAR; INTRAVENOUS EVERY 6 HOURS PRN
Status: DISCONTINUED | OUTPATIENT
Start: 2023-07-10 | End: 2023-07-13 | Stop reason: HOSPADM

## 2023-07-10 RX ORDER — LEVOTHYROXINE SODIUM 0.03 MG/1
25 TABLET ORAL DAILY
Status: DISCONTINUED | OUTPATIENT
Start: 2023-07-10 | End: 2023-07-13 | Stop reason: HOSPADM

## 2023-07-10 RX ORDER — INSULIN LISPRO 100 [IU]/ML
18 INJECTION, SOLUTION INTRAVENOUS; SUBCUTANEOUS ONCE
Status: COMPLETED | OUTPATIENT
Start: 2023-07-10 | End: 2023-07-10

## 2023-07-10 RX ORDER — SODIUM CHLORIDE 0.9 % (FLUSH) 0.9 %
5-40 SYRINGE (ML) INJECTION EVERY 12 HOURS SCHEDULED
Status: DISCONTINUED | OUTPATIENT
Start: 2023-07-10 | End: 2023-07-13 | Stop reason: HOSPADM

## 2023-07-10 RX ORDER — FUROSEMIDE 20 MG/1
20 TABLET ORAL
Status: DISCONTINUED | OUTPATIENT
Start: 2023-07-10 | End: 2023-07-10

## 2023-07-10 RX ORDER — SODIUM CHLORIDE 0.9 % (FLUSH) 0.9 %
5-40 SYRINGE (ML) INJECTION PRN
Status: DISCONTINUED | OUTPATIENT
Start: 2023-07-10 | End: 2023-07-13 | Stop reason: HOSPADM

## 2023-07-10 RX ORDER — INSULIN LISPRO 100 [IU]/ML
5 INJECTION, SOLUTION INTRAVENOUS; SUBCUTANEOUS
Status: DISCONTINUED | OUTPATIENT
Start: 2023-07-10 | End: 2023-07-10

## 2023-07-10 RX ORDER — SODIUM CHLORIDE 9 MG/ML
INJECTION, SOLUTION INTRAVENOUS CONTINUOUS
Status: DISCONTINUED | OUTPATIENT
Start: 2023-07-10 | End: 2023-07-10

## 2023-07-10 RX ORDER — FENOFIBRATE 54 MG/1
145 TABLET ORAL DAILY
Status: DISCONTINUED | OUTPATIENT
Start: 2023-07-10 | End: 2023-07-13 | Stop reason: HOSPADM

## 2023-07-10 RX ORDER — LISINOPRIL 40 MG/1
40 TABLET ORAL DAILY
Status: DISCONTINUED | OUTPATIENT
Start: 2023-07-10 | End: 2023-07-13 | Stop reason: HOSPADM

## 2023-07-10 RX ORDER — METOPROLOL TARTRATE 50 MG/1
50 TABLET, FILM COATED ORAL 2 TIMES DAILY
Status: DISCONTINUED | OUTPATIENT
Start: 2023-07-10 | End: 2023-07-13 | Stop reason: HOSPADM

## 2023-07-10 RX ORDER — INSULIN GLARGINE 100 [IU]/ML
20 INJECTION, SOLUTION SUBCUTANEOUS NIGHTLY
Status: DISCONTINUED | OUTPATIENT
Start: 2023-07-10 | End: 2023-07-11

## 2023-07-10 RX ORDER — ATORVASTATIN CALCIUM 80 MG/1
80 TABLET, FILM COATED ORAL NIGHTLY
Status: DISCONTINUED | OUTPATIENT
Start: 2023-07-10 | End: 2023-07-13 | Stop reason: HOSPADM

## 2023-07-10 RX ORDER — ONDANSETRON 4 MG/1
4 TABLET, ORALLY DISINTEGRATING ORAL EVERY 8 HOURS PRN
Status: DISCONTINUED | OUTPATIENT
Start: 2023-07-10 | End: 2023-07-13 | Stop reason: HOSPADM

## 2023-07-10 RX ORDER — SODIUM CHLORIDE, SODIUM LACTATE, POTASSIUM CHLORIDE, AND CALCIUM CHLORIDE .6; .31; .03; .02 G/100ML; G/100ML; G/100ML; G/100ML
500 INJECTION, SOLUTION INTRAVENOUS ONCE
Status: COMPLETED | OUTPATIENT
Start: 2023-07-10 | End: 2023-07-10

## 2023-07-10 RX ORDER — ACETAMINOPHEN 325 MG/1
650 TABLET ORAL EVERY 6 HOURS PRN
Status: DISCONTINUED | OUTPATIENT
Start: 2023-07-10 | End: 2023-07-13 | Stop reason: HOSPADM

## 2023-07-10 RX ORDER — INSULIN LISPRO 100 [IU]/ML
0-4 INJECTION, SOLUTION INTRAVENOUS; SUBCUTANEOUS
Status: DISCONTINUED | OUTPATIENT
Start: 2023-07-10 | End: 2023-07-13 | Stop reason: HOSPADM

## 2023-07-10 RX ORDER — IBUPROFEN 600 MG/1
1 TABLET ORAL PRN
Status: DISCONTINUED | OUTPATIENT
Start: 2023-07-10 | End: 2023-07-13 | Stop reason: HOSPADM

## 2023-07-10 RX ORDER — INSULIN LISPRO 100 [IU]/ML
3 INJECTION, SOLUTION INTRAVENOUS; SUBCUTANEOUS
Status: DISCONTINUED | OUTPATIENT
Start: 2023-07-10 | End: 2023-07-10

## 2023-07-10 RX ORDER — POLYETHYLENE GLYCOL 3350 17 G/17G
17 POWDER, FOR SOLUTION ORAL DAILY PRN
Status: DISCONTINUED | OUTPATIENT
Start: 2023-07-10 | End: 2023-07-13 | Stop reason: HOSPADM

## 2023-07-10 RX ADMIN — INSULIN GLARGINE 10 UNITS: 100 INJECTION, SOLUTION SUBCUTANEOUS at 10:48

## 2023-07-10 RX ADMIN — TROSPIUM CHLORIDE 20 MG: 20 TABLET, FILM COATED ORAL at 10:48

## 2023-07-10 RX ADMIN — INSULIN LISPRO 3 UNITS: 100 INJECTION, SOLUTION INTRAVENOUS; SUBCUTANEOUS at 07:58

## 2023-07-10 RX ADMIN — TROSPIUM CHLORIDE 20 MG: 20 TABLET, FILM COATED ORAL at 20:49

## 2023-07-10 RX ADMIN — METOPROLOL TARTRATE 50 MG: 50 TABLET, FILM COATED ORAL at 21:55

## 2023-07-10 RX ADMIN — LEVOTHYROXINE SODIUM 25 MCG: 0.03 TABLET ORAL at 05:34

## 2023-07-10 RX ADMIN — INSULIN LISPRO 4 UNITS: 100 INJECTION, SOLUTION INTRAVENOUS; SUBCUTANEOUS at 16:27

## 2023-07-10 RX ADMIN — ATORVASTATIN CALCIUM 80 MG: 80 TABLET, FILM COATED ORAL at 21:55

## 2023-07-10 RX ADMIN — IMMUNE GLOBULIN (HUMAN) 20 G: 10 INJECTION INTRAVENOUS; SUBCUTANEOUS at 16:52

## 2023-07-10 RX ADMIN — SODIUM CHLORIDE: 9 INJECTION, SOLUTION INTRAVENOUS at 03:15

## 2023-07-10 RX ADMIN — INSULIN LISPRO 4 UNITS: 100 INJECTION, SOLUTION INTRAVENOUS; SUBCUTANEOUS at 20:50

## 2023-07-10 RX ADMIN — SODIUM CHLORIDE, PRESERVATIVE FREE 10 ML: 5 INJECTION INTRAVENOUS at 20:55

## 2023-07-10 RX ADMIN — INSULIN GLARGINE 20 UNITS: 100 INJECTION, SOLUTION SUBCUTANEOUS at 20:50

## 2023-07-10 RX ADMIN — Medication 2000 UNITS: at 08:14

## 2023-07-10 RX ADMIN — SODIUM CHLORIDE, PRESERVATIVE FREE 10 ML: 5 INJECTION INTRAVENOUS at 20:50

## 2023-07-10 RX ADMIN — INSULIN LISPRO 18 UNITS: 100 INJECTION, SOLUTION INTRAVENOUS; SUBCUTANEOUS at 16:26

## 2023-07-10 RX ADMIN — IMMUNE GLOBULIN (HUMAN) 20 G: 10 INJECTION INTRAVENOUS; SUBCUTANEOUS at 17:57

## 2023-07-10 RX ADMIN — IMMUNE GLOBULIN (HUMAN) 20 G: 10 INJECTION INTRAVENOUS; SUBCUTANEOUS at 18:09

## 2023-07-10 RX ADMIN — SODIUM CHLORIDE, PRESERVATIVE FREE 10 ML: 5 INJECTION INTRAVENOUS at 08:14

## 2023-07-10 RX ADMIN — LISINOPRIL 40 MG: 40 TABLET ORAL at 08:14

## 2023-07-10 RX ADMIN — ACETAMINOPHEN 650 MG: 325 TABLET ORAL at 14:19

## 2023-07-10 RX ADMIN — INSULIN LISPRO 4 UNITS: 100 INJECTION, SOLUTION INTRAVENOUS; SUBCUTANEOUS at 11:36

## 2023-07-10 RX ADMIN — SODIUM CHLORIDE, POTASSIUM CHLORIDE, SODIUM LACTATE AND CALCIUM CHLORIDE 500 ML: 600; 310; 30; 20 INJECTION, SOLUTION INTRAVENOUS at 12:07

## 2023-07-10 RX ADMIN — METOPROLOL TARTRATE 50 MG: 50 TABLET, FILM COATED ORAL at 08:14

## 2023-07-10 RX ADMIN — IMMUNE GLOBULIN (HUMAN) 20 G: 10 INJECTION INTRAVENOUS; SUBCUTANEOUS at 15:35

## 2023-07-10 RX ADMIN — SODIUM CHLORIDE 500 MG: 9 INJECTION, SOLUTION INTRAVENOUS at 11:00

## 2023-07-10 RX ADMIN — SODIUM CHLORIDE: 9 INJECTION, SOLUTION INTRAVENOUS at 03:14

## 2023-07-10 ASSESSMENT — PAIN SCALES - GENERAL
PAINLEVEL_OUTOF10: 0

## 2023-07-10 ASSESSMENT — PAIN - FUNCTIONAL ASSESSMENT: PAIN_FUNCTIONAL_ASSESSMENT: NONE - DENIES PAIN

## 2023-07-10 NOTE — PROGRESS NOTES
Hospitalist Progress Note      Patient:  Jasper Leary    Unit/Bed:4A-08/008-A  YOB: 1950  MRN: [id number]   Acct: [de-identified]   PCP: 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 He was tx with 3 day course of high dose steroids, platelet count of 0306 on admission which recovered to 39,000 prior to discharge. Now re-presents with oral mucosa blood blisters and purpura and worsening platelet count of 7511. Given Solu-Medrol 500 mg IV x1 at ER. Dexamethasone 40 mg X 3 was ordered. B12, folate, CRP, fibrinogen WNL. LA 4.9. AG 17. Pt on metformin at home. - Change to IV Solu-medrol 500 mg daily  - Add gamunex  - Hematology/Oncology following   - LR Bolus x2 500ml  -Hold metformin and pioglitazone  - Repeat lactic acid  #Diabetes Mellitus 2: wel controlled with metformin and pioglitazone. A1c 7/10/23 6.9.  - Hold home medications. - Lantus 20 units nightly  - Lispro 3 units with meals and low dose SSI  #Abnormal EKG: EKG yesterday shows NSR with PVC's in bigeminy pattern. This was seen last week's admission as well. Mg 1.9, K+ 5. Last ECHO in 3/2021 relatively normal with EF 60%. - Complete ECHO ordered    #Hypertension: lisinopril, lopressor, and lasix at home. BP right now 148/78.  - continue home medications  #DELMER: on CPAP at home, does not want to use it at this time    #Hypothyroidism: continue home levothyroxine      Chief Complaint: oral mucosal ulcers    Initial H and P:-    80 y/o male hx of drug induced ITP likely 2/2 glucotrust use. Admitted last week and dx with ITP with platelet count 2034. Treated with high dose steroids and discharged with platelet count 95245. He was not sent home on any oral steroids.  Two days after admission the oral mucosal ulcers returned which prompted No results found for: BC    Blood culture #2:No results found for: BLOODCULT2    Organism:  Lab Results   Component Value Date/Time    ORG Morganella morganii ssp. morganii 09/20/2021 01:43 PM       No results found for: LABGRAM    MRSA culture only:No results found for: [facility]    Urine culture:   Lab Results   Component Value Date/Time    LABURIN Stephen count: <10,000 CFU/mL 09/20/2021 01:43 PM       Respiratory culture: No results found for: CULTRESP    Aerobic and Anaerobic :  No results found for: LABAERO  No results found for: LABANAE    Urinalysis:      Lab Results   Component Value Date/Time    NITRU Negative 10/26/2021 08:19 AM    WBCUA 0-2 09/20/2021 01:43 PM    BACTERIA NONE SEEN 09/20/2021 01:43 PM    RBCUA NONE SEEN 09/20/2021 01:43 PM    BLOODU neg 05/17/2023 09:44 AM    BLOODU NEGATIVE 09/20/2021 01:43 PM    SPECGRAV 1.025 05/17/2023 09:44 AM    SPECGRAV 1.020 09/20/2021 01:43 PM    GLUCOSEU neg 05/17/2023 09:44 AM    GLUCOSEU Negative 10/26/2021 08:19 AM       Radiology:  CT HEAD WO CONTRAST   Final Result   Impression:   1. No acute intracranial abnormality. Age-appropriate exam.   2. Stable intracranial exam.      This document has been electronically signed by: Jose David Santos MD on    07/09/2023 11:47 PM      All CTs at this facility use dose modulation techniques and iterative    reconstructions, and/or weight-based dosing   when appropriate to reduce radiation to a low as reasonably achievable. CT HEAD WO CONTRAST    Result Date: 7/9/2023  CT head without IV contrast: Comparison: CT/SR - CT HEAD WO CONTRAST - 04/02/2021 09:39 AM EDT Findings: Age-appropriate sulcation. No intracranial mass, midline shift, hydrocephalus, acute hemorrhage or infarct. Unremarkable orbits. No significant sinus disease. Mastoid air cells are clear. No skull fracture. No significant scalp soft tissue swelling. Impression: 1. No acute intracranial abnormality.  Age-appropriate exam. 2. Stable intracranial exam.

## 2023-07-10 NOTE — FLOWSHEET NOTE
07/10/23 1354   Safe Environment   Safety Measures Other (comment)  (VN completed admission required documents with pt and spouse at this time)     VN completes admission required documents with pt at this time , fall packet prevention reviewed , virtual services explained and accepted , call light within reach , no further questions at this time .

## 2023-07-10 NOTE — CARE COORDINATION
07/10/23 1208   Readmission Assessment   Number of Days since last admission? 1-7 days   Previous Disposition Home with Family   Who is being Interviewed Patient   What was the patient's/caregiver's perception as to why they think they needed to return back to the hospital? Other (Comment)  (\" I had mouth sores develop\")   Did you visit your Primary Care Physician after you left the hospital, before you returned this time? No  (appointment scheduled)   Why weren't you able to visit your PCP? Other (Comment)  (I had just been discharged. it is scheduled)   Did you see a specialist, such as Cardiac, Pulmonary, Orthopedic Physician, etc. after you left the hospital? No   Who advised the patient to return to the hospital? [facility] Staff   Does the patient report anything that got in the way of taking their medications? No   In our efforts to provide the best possible care to you and others like you, can you think of anything that we could have done to help you after you left the hospital the first time, so that you might not have needed to return so soon?  Other (Comment)  (\" I was set to go\")

## 2023-07-10 NOTE — ED TRIAGE NOTES
Patient presents to the ED with chief complaint of mouth sores. Patient reports he was discharged from here last Wednesday. He says he was diagnosed with thrombocytopenia. Patient reports he developed sores again since his last visit at 1600 today.

## 2023-07-10 NOTE — ED NOTES
ED to inpatient nurses report    Chief Complaint   Patient presents with    Mouth Lesions      Present to ED from home  LOC: alert and orientated to name, place, date  Vital signs   Vitals:    07/09/23 2350 07/10/23 0010 07/10/23 0141 07/10/23 0213   BP:  (!) 130/48 135/77 133/60   Pulse: 72 72 80 70   Resp: 18 18 22 19   Temp:       TempSrc:       SpO2: 98% 97%  98%   Weight:          Oxygen Baseline 0 L    Current needs required 0 L   LDAs:   Peripheral IV 07/09/23 Left;Posterior Hand (Active)   Site Assessment Clean, dry & intact 07/09/23 2242     Mobility: Independent  Pending ED orders: none  Present condition: stable        Electronically signed by Nithya Calderón RN on 7/10/2023 at 2:24 AM      Salma Hanna  07/10/23 9085

## 2023-07-10 NOTE — FLOWSHEET NOTE
Pt responds to audio , permits video , self and role identified , pt resting in chair  in position of comfort , call light visible within reach , no voiced concerns or complaints .

## 2023-07-10 NOTE — CARE COORDINATION
Case Management Assessment  Initial Evaluation    Date/Time of Evaluation: 7/10/2023 12:06 PM  Assessment Completed by: Sofia Hubbard RN    If patient is discharged prior to next notation, then this note serves as note for discharge by case management. Patient Name: Jasper Leary                   YOB: 1950  Diagnosis: Drug-induced ITP [D69.59, T50.905A]  Petechiae [R23.3]  Thrombocytopenia ([address]) [D69.6]                   Date / Time: 7/9/2023  9:58 PM  [facility]     Patient Admission Status: Observation   Readmission Risk Low 0-14, Mod 15-19), High > 20: Readmission Risk Score: 12.1    Current PCP: [address] [place] [address]  PCP verified by ? Yes    Chart Reviewed: Yes      History Provided by: Patient  Patient Orientation: Alert and Oriented    Patient Cognition: Alert    Hospitalization in the last 30 days (Readmission):  Yes    If yes, Readmission Assessment in CM Navigator will be completed. Advance Directives:      Code Status: Full Code   Patient's Primary Decision Maker is: Legal Next of Kin      Discharge Planning:    Patient lives with: Spouse/Significant Other Type of Home: House  Primary Care Giver: Self  Patient Support Systems include: Spouse/Significant Other   Current Financial resources: Medicare  Current community resources: None  Current services prior to admission: None            Current DME:              Type of Home Care services:  None    ADLS  Prior functional level: Independent in ADLs/IADLs  Current functional level: Independent in ADLs/IADLs    Family can provide assistance at DC: Yes  Would you like Case Management to discuss the discharge plan with any other family members/significant others, and if so, who?  No  Plans to Return to Present Housing: Yes  Other Identified Issues/Barriers to RETURNING to current housing: medical complications  Potential Assistance needed at discharge: N/A            Potential DME:    Patient expects to discharge to:

## 2023-07-10 NOTE — PROGRESS NOTES
Called RN to see if the patient was available for his echo, she asked if we could try back later.  RN said it was ok to do in the AM.

## 2023-07-10 NOTE — PROGRESS NOTES
Mr. Evert Murrell is a 29-year-old white male whom I saw last week because of severe thrombocytopenia most likely immune drug related. The patient was started on high-dose steroids last week. The patient platelet count on day #3 reach 39,000. The patient was discharged home to see me this week in my office. The patient did develop more petechiae. His platelet was found to be 2000. The patient being readmitted for treatment. I will start the patient on combination of gammaglobulin and high-dose steroids. We will monitor his blood count. The patient may need extended treatment as an outpatient. A full consult will be dictated. We thank you for the consult.

## 2023-07-10 NOTE — PROGRESS NOTES
Patient admitted to [facility] Room 08 from ED  No complaints upon arrival to the room. IV site free of s/s of infection or infiltration. Vital signs obtained. Assessment and data collection initiated. Oriented to room. Policies and procedures for 4A explained All questions answered with no further questions at this time. Fall prevention and safety brochure discussed with patient. 2 person skin check completed with Reece BUTLER. Patient declines PCP notification. Patient declines family notification.

## 2023-07-10 NOTE — ED PROVIDER NOTES
[address] ENCOUNTER        PATIENT NAME: Matilde Seaman  MRN: [id number]  : 1950  MAHER: 2023  PROVIDER: Yury Goetz MD    CHIEF COMPLAINT       Chief Complaint   Patient presents with    Mouth Lesions       Patient is seen and evaluated in a timely fashion. Nurses Notes are reviewed and I agree except as noted in the HPI. HISTORY OF PRESENT ILLNESS   Matilde Seaman is a 81 y.o. male who presents to Emergency Department with Mouth Lesions     A 74-year-old male with a recently diagnosed history of immune thrombocytopenic purpura (ITP) presents for evaluation of multiple mouth sores that began today. It is important to note that the patient has been experiencing these bleeding mouth sores for approximately 1.5 weeks. Initially, the patient sought medical attention at [facility] on 2023, where he was diagnosed with ITP. At that time, both a CT scan of the head and a CT scan of the abdomen and pelvis were performed, but no acute abnormalities were detected. Following the initial diagnosis, the patient was transferred to [facility] and admitted from 2023, to 2023. During the hospitalization, the patient's platelet count dropped to the lowest level of 1,000, while his hemoglobin remained at 13.1. The hematology-oncology team recommended a three-day course of high-dose steroids as treatment for the thrombocytopenia, likely caused by the underlying ITP. Patient did have improving mouth bleeding source upon discharge. This HPI was provided by patient. PAST MEDICAL HISTORY    has a past medical history of Arthritis, Cervical pain, History of kidney stones, Hypertension, DELMER on CPAP, Prostate cancer ([address]), Shingles, Strain of quadriceps muscle, and Thyroid disease. SURGICAL HISTORY      has a past surgical history that includes Prostate surgery (2008); Leg Tendon Surgery (2008); hernia repair (); Tonsillectomy ();  Colonoscopy 07/09/2023 11:51:17 PM      OUTPATIENT FOLLOW UP THE PATIENT:  No follow-up provider specified.   DISCHARGE MEDICATIONS:(None if blank)  New Prescriptions    No medications on file         MD Kenyatta Varma MD  07/10/23 9815

## 2023-07-10 NOTE — PROGRESS NOTES
Admission attempted at 0315. Bedside RN Jeni Garcia was in the room and said pt was there a few days ago and she would complete admission on unit.

## 2023-07-11 LAB
ANION GAP SERPL CALC-SCNC: 11 MEQ/L (ref 8–16)
BASOPHILS ABSOLUTE: 0 THOU/MM3 (ref 0–0.1)
BASOPHILS NFR BLD AUTO: 0.1 %
BUN SERPL-MCNC: 30 MG/DL (ref 7–22)
CALCIUM SERPL-MCNC: 8.7 MG/DL (ref 8.5–10.5)
CHLORIDE SERPL-SCNC: 104 MEQ/L (ref 98–111)
CO2 SERPL-SCNC: 21 MEQ/L (ref 23–33)
CREAT SERPL-MCNC: 1 MG/DL (ref 0.4–1.2)
DEPRECATED RDW RBC AUTO: 41 FL (ref 35–45)
EOSINOPHIL NFR BLD AUTO: 0 %
EOSINOPHILS ABSOLUTE: 0 THOU/MM3 (ref 0–0.4)
ERYTHROCYTE [DISTWIDTH] IN BLOOD BY AUTOMATED COUNT: 11.5 % (ref 11.5–14.5)
GFR SERPL CREATININE-BSD FRML MDRD: > 60 ML/MIN/1.73M2
GLUCOSE BLD STRIP.AUTO-MCNC: 274 MG/DL (ref 70–108)
GLUCOSE BLD STRIP.AUTO-MCNC: 297 MG/DL (ref 70–108)
GLUCOSE BLD STRIP.AUTO-MCNC: 300 MG/DL (ref 70–108)
GLUCOSE BLD STRIP.AUTO-MCNC: 312 MG/DL (ref 70–108)
GLUCOSE SERPL-MCNC: 284 MG/DL (ref 70–108)
HCT VFR BLD AUTO: 34.1 % (ref 42–52)
HGB BLD-MCNC: 11.7 GM/DL (ref 14–18)
IMM GRANULOCYTES # BLD AUTO: 0.09 THOU/MM3 (ref 0–0.07)
IMM GRANULOCYTES NFR BLD AUTO: 1.1 %
LACTATE SERPL-SCNC: 2.6 MMOL/L (ref 0.5–2)
LV EF: 45 %
LVEF MODALITY: NORMAL
LYMPHOCYTES ABSOLUTE: 0.6 THOU/MM3 (ref 1–4.8)
LYMPHOCYTES NFR BLD AUTO: 7.5 %
MAGNESIUM SERPL-MCNC: 2.2 MG/DL (ref 1.6–2.4)
MCH RBC QN AUTO: 33.7 PG (ref 26–33)
MCHC RBC AUTO-ENTMCNC: 34.3 GM/DL (ref 32.2–35.5)
MCV RBC AUTO: 98.3 FL (ref 80–94)
MONOCYTES ABSOLUTE: 0.4 THOU/MM3 (ref 0.4–1.3)
MONOCYTES NFR BLD AUTO: 5.1 %
NEUTROPHILS NFR BLD AUTO: 86.2 %
NRBC BLD AUTO-RTO: 0 /100 WBC
PLATELET # BLD AUTO: 15 THOU/MM3 (ref 130–400)
PMV BLD AUTO: 13.9 FL (ref 9.4–12.4)
POTASSIUM SERPL-SCNC: 4.4 MEQ/L (ref 3.5–5.2)
RBC # BLD AUTO: 3.47 MILL/MM3 (ref 4.7–6.1)
SEGMENTED NEUTROPHILS ABSOLUTE COUNT: 7.2 THOU/MM3 (ref 1.8–7.7)
SODIUM SERPL-SCNC: 136 MEQ/L (ref 135–145)
WBC # BLD AUTO: 8.4 THOU/MM3 (ref 4.8–10.8)

## 2023-07-11 PROCEDURE — 83605 ASSAY OF LACTIC ACID: CPT

## 2023-07-11 PROCEDURE — 6370000000 HC RX 637 (ALT 250 FOR IP)

## 2023-07-11 PROCEDURE — 82948 REAGENT STRIP/BLOOD GLUCOSE: CPT

## 2023-07-11 PROCEDURE — 2580000003 HC RX 258

## 2023-07-11 PROCEDURE — 99233 SBSQ HOSP IP/OBS HIGH 50: CPT | Performed by: STUDENT IN AN ORGANIZED HEALTH CARE EDUCATION/TRAINING PROGRAM

## 2023-07-11 PROCEDURE — 2580000003 HC RX 258: Performed by: STUDENT IN AN ORGANIZED HEALTH CARE EDUCATION/TRAINING PROGRAM

## 2023-07-11 PROCEDURE — 6360000002 HC RX W HCPCS: Performed by: STUDENT IN AN ORGANIZED HEALTH CARE EDUCATION/TRAINING PROGRAM

## 2023-07-11 PROCEDURE — 36415 COLL VENOUS BLD VENIPUNCTURE: CPT

## 2023-07-11 PROCEDURE — 93306 TTE W/DOPPLER COMPLETE: CPT

## 2023-07-11 PROCEDURE — 6370000000 HC RX 637 (ALT 250 FOR IP): Performed by: STUDENT IN AN ORGANIZED HEALTH CARE EDUCATION/TRAINING PROGRAM

## 2023-07-11 PROCEDURE — 2060000000 HC ICU INTERMEDIATE R&B

## 2023-07-11 PROCEDURE — 80048 BASIC METABOLIC PNL TOTAL CA: CPT

## 2023-07-11 PROCEDURE — 85025 COMPLETE CBC W/AUTO DIFF WBC: CPT

## 2023-07-11 PROCEDURE — 83735 ASSAY OF MAGNESIUM: CPT

## 2023-07-11 RX ORDER — SODIUM CHLORIDE, SODIUM LACTATE, POTASSIUM CHLORIDE, CALCIUM CHLORIDE 600; 310; 30; 20 MG/100ML; MG/100ML; MG/100ML; MG/100ML
INJECTION, SOLUTION INTRAVENOUS CONTINUOUS
Status: ACTIVE | OUTPATIENT
Start: 2023-07-11 | End: 2023-07-12

## 2023-07-11 RX ORDER — INSULIN GLARGINE 100 [IU]/ML
25 INJECTION, SOLUTION SUBCUTANEOUS NIGHTLY
Status: DISCONTINUED | OUTPATIENT
Start: 2023-07-11 | End: 2023-07-13

## 2023-07-11 RX ORDER — INSULIN LISPRO 100 [IU]/ML
7 INJECTION, SOLUTION INTRAVENOUS; SUBCUTANEOUS
Status: DISCONTINUED | OUTPATIENT
Start: 2023-07-11 | End: 2023-07-13 | Stop reason: HOSPADM

## 2023-07-11 RX ADMIN — Medication 2000 UNITS: at 09:21

## 2023-07-11 RX ADMIN — INSULIN LISPRO 2 UNITS: 100 INJECTION, SOLUTION INTRAVENOUS; SUBCUTANEOUS at 09:22

## 2023-07-11 RX ADMIN — TROSPIUM CHLORIDE 20 MG: 20 TABLET, FILM COATED ORAL at 19:42

## 2023-07-11 RX ADMIN — IMMUNE GLOBULIN (HUMAN) 20 G: 10 INJECTION INTRAVENOUS; SUBCUTANEOUS at 15:27

## 2023-07-11 RX ADMIN — SODIUM CHLORIDE, PRESERVATIVE FREE 10 ML: 5 INJECTION INTRAVENOUS at 09:23

## 2023-07-11 RX ADMIN — SODIUM CHLORIDE 500 MG: 9 INJECTION, SOLUTION INTRAVENOUS at 09:28

## 2023-07-11 RX ADMIN — IMMUNE GLOBULIN (HUMAN) 20 G: 10 INJECTION INTRAVENOUS; SUBCUTANEOUS at 16:00

## 2023-07-11 RX ADMIN — TROSPIUM CHLORIDE 20 MG: 20 TABLET, FILM COATED ORAL at 09:21

## 2023-07-11 RX ADMIN — INSULIN LISPRO 3 UNITS: 100 INJECTION, SOLUTION INTRAVENOUS; SUBCUTANEOUS at 16:20

## 2023-07-11 RX ADMIN — SODIUM CHLORIDE, POTASSIUM CHLORIDE, SODIUM LACTATE AND CALCIUM CHLORIDE: 600; 310; 30; 20 INJECTION, SOLUTION INTRAVENOUS at 16:35

## 2023-07-11 RX ADMIN — IMMUNE GLOBULIN (HUMAN) 20 G: 10 INJECTION INTRAVENOUS; SUBCUTANEOUS at 13:42

## 2023-07-11 RX ADMIN — INSULIN LISPRO 7 UNITS: 100 INJECTION, SOLUTION INTRAVENOUS; SUBCUTANEOUS at 16:19

## 2023-07-11 RX ADMIN — ATORVASTATIN CALCIUM 80 MG: 80 TABLET, FILM COATED ORAL at 19:42

## 2023-07-11 RX ADMIN — INSULIN LISPRO 5 UNITS: 100 INJECTION, SOLUTION INTRAVENOUS; SUBCUTANEOUS at 11:36

## 2023-07-11 RX ADMIN — LEVOTHYROXINE SODIUM 25 MCG: 0.03 TABLET ORAL at 06:02

## 2023-07-11 RX ADMIN — INSULIN LISPRO 5 UNITS: 100 INJECTION, SOLUTION INTRAVENOUS; SUBCUTANEOUS at 09:22

## 2023-07-11 RX ADMIN — INSULIN GLARGINE 25 UNITS: 100 INJECTION, SOLUTION SUBCUTANEOUS at 19:42

## 2023-07-11 RX ADMIN — INSULIN LISPRO 3 UNITS: 100 INJECTION, SOLUTION INTRAVENOUS; SUBCUTANEOUS at 11:37

## 2023-07-11 RX ADMIN — METOPROLOL TARTRATE 50 MG: 50 TABLET, FILM COATED ORAL at 19:42

## 2023-07-11 RX ADMIN — IMMUNE GLOBULIN (HUMAN) 20 G: 10 INJECTION INTRAVENOUS; SUBCUTANEOUS at 14:57

## 2023-07-11 ASSESSMENT — PAIN SCALES - GENERAL
PAINLEVEL_OUTOF10: 0
PAINLEVEL_OUTOF10: 0

## 2023-07-11 NOTE — PLAN OF CARE
Problem: Discharge Planning  Goal: Discharge to home or other facility with appropriate resources  Outcome: Progressing  Flowsheets (Taken 7/11/2023 1432)  Discharge to home or other facility with appropriate resources:   Identify barriers to discharge with patient and caregiver   Arrange for needed discharge resources and transportation as appropriate   Identify discharge learning needs (meds, wound care, etc)     Problem: Pain  Goal: Verbalizes/displays adequate comfort level or baseline comfort level  Outcome: Progressing  Flowsheets (Taken 7/11/2023 1432)  Verbalizes/displays adequate comfort level or baseline comfort level:   Encourage patient to monitor pain and request assistance   Assess pain using appropriate pain scale   Administer analgesics based on type and severity of pain and evaluate response   Implement non-pharmacological measures as appropriate and evaluate response   Notify Licensed Independent Practitioner if interventions unsuccessful or patient reports new pain     Problem: Safety - Adult  Goal: Free from fall injury  Outcome: Progressing  Flowsheets (Taken 7/11/2023 1432)  Free From Fall Injury:   Instruct family/caregiver on patient safety   Based on caregiver fall risk screen, instruct family/caregiver to ask for assistance with transferring infant if caregiver noted to have fall risk factors     Problem: ABCDS Injury Assessment  Goal: Absence of physical injury  Outcome: Progressing  Flowsheets (Taken 7/11/2023 1432)  Absence of Physical Injury: Implement safety measures based on patient assessment     Problem: Cardiovascular - Adult  Goal: Absence of cardiac dysrhythmias or at baseline  Outcome: Progressing  Flowsheets (Taken 7/11/2023 1432)  Absence of cardiac dysrhythmias or at baseline:   Assess for signs of decreased cardiac output   Monitor cardiac rate and rhythm     Problem: Skin/Tissue Integrity - Adult  Goal: Skin integrity remains intact  Outcome: Progressing  Flowsheets

## 2023-07-11 NOTE — PROGRESS NOTES
Hematology:  Mr. Erica Vargas is a 79-year-old white male who has been readmitted on 7/9/7/9/2023 with severe thrombocytopenia. The patient is carrying diagnosis of drug-induced immune thrombocytopenia. The patient was admitted last week with drug-induced immune thrombocytopenia. The patient was treated with high-dose steroids. On the day of discharge his platelets 99,730. The patient did developed  petechiae at home. The patient was evaluated in the ER. The patient was found to have a platelet count of 6716. The patient is currently getting gammaglobulin day #2 and high-dose steroids day #2. Platelets improving. We are going to monitor CBC daily. The patient may need extended treatment after discharge. I will see the patient in my office next week.

## 2023-07-11 NOTE — PLAN OF CARE
Problem: Discharge Planning  Goal: Discharge to home or other facility with appropriate resources  Outcome: Progressing  Flowsheets (Taken 7/10/2023 2230)  Discharge to home or other facility with appropriate resources:   Identify barriers to discharge with patient and caregiver   Arrange for needed discharge resources and transportation as appropriate     Problem: Pain  Goal: Verbalizes/displays adequate comfort level or baseline comfort level  Outcome: Progressing  Flowsheets (Taken 7/10/2023 2230)  Verbalizes/displays adequate comfort level or baseline comfort level:   Encourage patient to monitor pain and request assistance   Assess pain using appropriate pain scale     Problem: Safety - Adult  Goal: Free from fall injury  Outcome: Progressing  Flowsheets (Taken 7/10/2023 2230)  Free From Fall Injury:   Instruct family/caregiver on patient safety   Based on caregiver fall risk screen, instruct family/caregiver to ask for assistance with transferring infant if caregiver noted to have fall risk factors     Problem: Skin/Tissue Integrity - Adult  Goal: Skin integrity remains intact  Outcome: Progressing  Flowsheets (Taken 7/10/2023 2230)  Skin Integrity Remains Intact: Monitor for areas of redness and/or skin breakdown     Problem: Skin/Tissue Integrity - Adult  Goal: Oral mucous membranes remain intact  Outcome: Progressing  Flowsheets (Taken 7/10/2023 2230)  Oral Mucous Membranes Remain Intact:   Assess oral mucosa and hygiene practices   Implement oral medicated treatments as ordered   Implement preventative oral hygiene regimen     Problem: Hematologic - Adult  Goal: Maintains hematologic stability  Outcome: Progressing  Flowsheets (Taken 7/10/2023 2230)  Maintains hematologic stability:   Assess for signs and symptoms of bleeding or hemorrhage   Monitor labs for bleeding or clotting disorders

## 2023-07-11 NOTE — CONSULTS
[place] [address]                                  CONSULTATION    PATIENT NAME: Jasper Leary                   :        1950  MED REC NO:   [id number]                           ROOM:       0008  ACCOUNT NO:   [de-identified]                           ADMIT DATE: 2023  PROVIDER:     aMry Calderón M.D.    General Sack DATE:  07/10/2023    HEMATOLOGY CONSULTATION    HISTORY OF PRESENT ILLNESS:  The patient is a 74-year-old gentleman. I  saw him in the hospital on 2023 where he presented with severe  thrombocytopenia. We felt that the patient did have ITP drug induced. The patient presented at this time to the hospital with a platelet count  of 0936. The patient was started on high-dose steroid, Solu-Medrol 500  mg, for three days. On day number three, the patient did have a  platelet count of 58,270. We felt that it was safe for the patient to  be discharged. The patient was given an appointment to see me in my  office the following week. Apparently, the patient started to have more  petechiae, was seen in the emergency room and he was found to have a  platelet count of 1268. I was called early morning about his condition. I did recommend to start the patient on Solu-Medrol and gammaglobulin. The patient denies any rectal bleeding. He denies any history of  vomiting blood or coughing up blood. PAST MEDICAL HISTORY:  1. Arthritis. 2.  History of kidney stone. 3.  Hypertension. 4.  Sleep apnea. 5.  Prostate cancer. 6.  Shingles. 7.  Thyroid disease. PAST SURGICAL HISTORY:  1. Hernia repair. 2.  Neck surgery. 3.  Prostate surgery. 4.  Tonsillectomy. HOME MEDICATIONS:  See medication list.    ALLERGIES:  HE IS ALLERGIC TO PENICILLIN. SOCIAL HISTORY:  He lives with his wife. He does not smoke. He does  not drink alcohol. He is retired.     FAMILY HISTORY:  Father has a history of

## 2023-07-11 NOTE — FLOWSHEET NOTE
07/11/23 1042   Safe Environment   Safety Measures Bed in low position;Side rails X 2;Call light within reach  (Virtual Nurse Safety Round  Complete)     Call placed to patients room, patient responds to audio, permitted video. Patient alert and oriented. Patient sitting up in his chair at bedside, we had a long conversation about how he is feeling and his plan of care, patient also stated he is officially inpatient now and not just observation, he says he feels much better after his last infusion and hope he continues to feel this well. Patient denied any voiced conscerns/complaints at this time. Patient educated on [employer] call light. Call light within reach, no signs or symtpoms of distress noted.

## 2023-07-12 LAB
ANION GAP SERPL CALC-SCNC: 9 MEQ/L (ref 8–16)
BASOPHILS ABSOLUTE: 0 THOU/MM3 (ref 0–0.1)
BASOPHILS NFR BLD AUTO: 0.1 %
BUN SERPL-MCNC: 27 MG/DL (ref 7–22)
CALCIUM SERPL-MCNC: 8.7 MG/DL (ref 8.5–10.5)
CHLORIDE SERPL-SCNC: 103 MEQ/L (ref 98–111)
CO2 SERPL-SCNC: 23 MEQ/L (ref 23–33)
CREAT SERPL-MCNC: 0.9 MG/DL (ref 0.4–1.2)
DEPRECATED RDW RBC AUTO: 42.5 FL (ref 35–45)
EOSINOPHIL NFR BLD AUTO: 0 %
EOSINOPHILS ABSOLUTE: 0 THOU/MM3 (ref 0–0.4)
ERYTHROCYTE [DISTWIDTH] IN BLOOD BY AUTOMATED COUNT: 11.9 % (ref 11.5–14.5)
GFR SERPL CREATININE-BSD FRML MDRD: > 60 ML/MIN/1.73M2
GLUCOSE BLD STRIP.AUTO-MCNC: 237 MG/DL (ref 70–108)
GLUCOSE BLD STRIP.AUTO-MCNC: 259 MG/DL (ref 70–108)
GLUCOSE BLD STRIP.AUTO-MCNC: 267 MG/DL (ref 70–108)
GLUCOSE BLD STRIP.AUTO-MCNC: 319 MG/DL (ref 70–108)
GLUCOSE SERPL-MCNC: 266 MG/DL (ref 70–108)
HCT VFR BLD AUTO: 34.2 % (ref 42–52)
HGB BLD-MCNC: 11.6 GM/DL (ref 14–18)
IMM GRANULOCYTES # BLD AUTO: 0.16 THOU/MM3 (ref 0–0.07)
IMM GRANULOCYTES NFR BLD AUTO: 1.9 %
LACTATE SERPL-SCNC: 2.6 MMOL/L (ref 0.5–2)
LACTATE SERPL-SCNC: 2.6 MMOL/L (ref 0.5–2)
LACTATE SERPL-SCNC: 2.8 MMOL/L (ref 0.5–2)
LYMPHOCYTES ABSOLUTE: 0.7 THOU/MM3 (ref 1–4.8)
LYMPHOCYTES NFR BLD AUTO: 8.8 %
MCH RBC QN AUTO: 33.5 PG (ref 26–33)
MCHC RBC AUTO-ENTMCNC: 33.9 GM/DL (ref 32.2–35.5)
MCV RBC AUTO: 98.8 FL (ref 80–94)
MONOCYTES ABSOLUTE: 0.5 THOU/MM3 (ref 0.4–1.3)
MONOCYTES NFR BLD AUTO: 6.1 %
NEUTROPHILS NFR BLD AUTO: 83.1 %
NRBC BLD AUTO-RTO: 0 /100 WBC
PLATELET # BLD AUTO: 56 THOU/MM3 (ref 130–400)
PMV BLD AUTO: 11.8 FL (ref 9.4–12.4)
POTASSIUM SERPL-SCNC: 4.5 MEQ/L (ref 3.5–5.2)
RBC # BLD AUTO: 3.46 MILL/MM3 (ref 4.7–6.1)
SEGMENTED NEUTROPHILS ABSOLUTE COUNT: 7.1 THOU/MM3 (ref 1.8–7.7)
SODIUM SERPL-SCNC: 135 MEQ/L (ref 135–145)
WBC # BLD AUTO: 8.5 THOU/MM3 (ref 4.8–10.8)

## 2023-07-12 PROCEDURE — 80048 BASIC METABOLIC PNL TOTAL CA: CPT

## 2023-07-12 PROCEDURE — 2060000000 HC ICU INTERMEDIATE R&B

## 2023-07-12 PROCEDURE — 2580000003 HC RX 258: Performed by: STUDENT IN AN ORGANIZED HEALTH CARE EDUCATION/TRAINING PROGRAM

## 2023-07-12 PROCEDURE — 2580000003 HC RX 258

## 2023-07-12 PROCEDURE — 82948 REAGENT STRIP/BLOOD GLUCOSE: CPT

## 2023-07-12 PROCEDURE — 36415 COLL VENOUS BLD VENIPUNCTURE: CPT

## 2023-07-12 PROCEDURE — 99233 SBSQ HOSP IP/OBS HIGH 50: CPT | Performed by: INTERNAL MEDICINE

## 2023-07-12 PROCEDURE — 6360000002 HC RX W HCPCS: Performed by: STUDENT IN AN ORGANIZED HEALTH CARE EDUCATION/TRAINING PROGRAM

## 2023-07-12 PROCEDURE — 6370000000 HC RX 637 (ALT 250 FOR IP): Performed by: STUDENT IN AN ORGANIZED HEALTH CARE EDUCATION/TRAINING PROGRAM

## 2023-07-12 PROCEDURE — 6370000000 HC RX 637 (ALT 250 FOR IP)

## 2023-07-12 PROCEDURE — 85025 COMPLETE CBC W/AUTO DIFF WBC: CPT

## 2023-07-12 PROCEDURE — 83605 ASSAY OF LACTIC ACID: CPT

## 2023-07-12 RX ORDER — SODIUM CHLORIDE, SODIUM LACTATE, POTASSIUM CHLORIDE, AND CALCIUM CHLORIDE .6; .31; .03; .02 G/100ML; G/100ML; G/100ML; G/100ML
500 INJECTION, SOLUTION INTRAVENOUS ONCE
Status: COMPLETED | OUTPATIENT
Start: 2023-07-12 | End: 2023-07-12

## 2023-07-12 RX ORDER — SODIUM CHLORIDE, SODIUM LACTATE, POTASSIUM CHLORIDE, CALCIUM CHLORIDE 600; 310; 30; 20 MG/100ML; MG/100ML; MG/100ML; MG/100ML
INJECTION, SOLUTION INTRAVENOUS CONTINUOUS
Status: DISCONTINUED | OUTPATIENT
Start: 2023-07-12 | End: 2023-07-13 | Stop reason: HOSPADM

## 2023-07-12 RX ORDER — HYDRALAZINE HYDROCHLORIDE 20 MG/ML
5 INJECTION INTRAMUSCULAR; INTRAVENOUS EVERY 6 HOURS PRN
Status: DISCONTINUED | OUTPATIENT
Start: 2023-07-12 | End: 2023-07-13 | Stop reason: HOSPADM

## 2023-07-12 RX ADMIN — Medication 2000 UNITS: at 09:14

## 2023-07-12 RX ADMIN — TROSPIUM CHLORIDE 20 MG: 20 TABLET, FILM COATED ORAL at 20:34

## 2023-07-12 RX ADMIN — TROSPIUM CHLORIDE 20 MG: 20 TABLET, FILM COATED ORAL at 09:14

## 2023-07-12 RX ADMIN — ATORVASTATIN CALCIUM 80 MG: 80 TABLET, FILM COATED ORAL at 20:34

## 2023-07-12 RX ADMIN — FUROSEMIDE 20 MG: 20 TABLET ORAL at 09:14

## 2023-07-12 RX ADMIN — LISINOPRIL 40 MG: 40 TABLET ORAL at 09:21

## 2023-07-12 RX ADMIN — LEVOTHYROXINE SODIUM 25 MCG: 0.03 TABLET ORAL at 05:35

## 2023-07-12 RX ADMIN — INSULIN GLARGINE 25 UNITS: 100 INJECTION, SOLUTION SUBCUTANEOUS at 20:34

## 2023-07-12 RX ADMIN — METOPROLOL TARTRATE 50 MG: 50 TABLET, FILM COATED ORAL at 09:14

## 2023-07-12 RX ADMIN — INSULIN LISPRO 3 UNITS: 100 INJECTION, SOLUTION INTRAVENOUS; SUBCUTANEOUS at 16:29

## 2023-07-12 RX ADMIN — SODIUM CHLORIDE, POTASSIUM CHLORIDE, SODIUM LACTATE AND CALCIUM CHLORIDE: 600; 310; 30; 20 INJECTION, SOLUTION INTRAVENOUS at 11:38

## 2023-07-12 RX ADMIN — INSULIN LISPRO 2 UNITS: 100 INJECTION, SOLUTION INTRAVENOUS; SUBCUTANEOUS at 09:15

## 2023-07-12 RX ADMIN — SODIUM CHLORIDE, POTASSIUM CHLORIDE, SODIUM LACTATE AND CALCIUM CHLORIDE 500 ML: 600; 310; 30; 20 INJECTION, SOLUTION INTRAVENOUS at 09:19

## 2023-07-12 RX ADMIN — INSULIN LISPRO 7 UNITS: 100 INJECTION, SOLUTION INTRAVENOUS; SUBCUTANEOUS at 09:14

## 2023-07-12 RX ADMIN — INSULIN LISPRO 7 UNITS: 100 INJECTION, SOLUTION INTRAVENOUS; SUBCUTANEOUS at 16:29

## 2023-07-12 RX ADMIN — SODIUM CHLORIDE 500 MG: 9 INJECTION, SOLUTION INTRAVENOUS at 09:29

## 2023-07-12 RX ADMIN — INSULIN LISPRO 7 UNITS: 100 INJECTION, SOLUTION INTRAVENOUS; SUBCUTANEOUS at 11:41

## 2023-07-12 RX ADMIN — SODIUM CHLORIDE, POTASSIUM CHLORIDE, SODIUM LACTATE AND CALCIUM CHLORIDE: 600; 310; 30; 20 INJECTION, SOLUTION INTRAVENOUS at 20:35

## 2023-07-12 RX ADMIN — INSULIN LISPRO 1 UNITS: 100 INJECTION, SOLUTION INTRAVENOUS; SUBCUTANEOUS at 11:41

## 2023-07-12 ASSESSMENT — PAIN SCALES - GENERAL
PAINLEVEL_OUTOF10: 0

## 2023-07-12 NOTE — PROGRESS NOTES
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 He was tx with 3 day course of high dose steroids, platelet count of 5530 on admission which recovered to 39,000 prior to discharge. Now re-presents with oral mucosa blood blisters and purpura and worsening platelet count of 7374. Given Solu-Medrol 500 mg IV x1 at ER. Dexamethasone 40 mg X 3 was ordered. B12, folate, CRP, fibrinogen WNL. LA 4.9. AG 17 two days ago. AG closed at this time. On exam, blood blisters in mouth are remarkably improved. There is no bleeding in his mouth. Platelets today 26,293. Likely discharge tomorrow after fluids and repeat LA.     - IV Solu-medrol 500 mg daily  - discontinue gamunex today  - hold metformin and proglitazone  - Hematology/Oncology following. They cleared the patient from their standpoint and want to see him 7/18/23 in his office. - LR 50ml/hr for 12 hours, repeat LA tomorrow. - track platelets with daily CBC    #Lactic Acidosis: likely 2/2 metformin use. LA on arrival 4.9. Decreased to 2.6 but increased to 2.8 last night. LR 50 ml/hr last night for 12 hours. Repeat today 2.6. LR bolus given. AG 9 today. -LR 75/hr running currently. - Repeat LA tomorrow morning. #Diabetes Mellitus 2: fairly well controlled at home with metformin and pioglitazone. A1c 7/10/23 6.9. BG very high yesterday secondary to high dose steroids. Will likely have to go home on insulin 2/2 continued steroid use.  this afternoon.  - Hold home oral medications.    - Lantus 25 units nightly  - Lispro 7 units with meals and low does SSI    #Abnormal EKG: EKG yesterday shows NSR with PVC's in Rales/Wheezes/Rhonchi. Cardiovascular: Regular rate and rhythm with normal S1/S2 without murmurs, rubs or gallops. Abdomen: Soft, non-tender, non-distended with normal bowel sounds. Musculoskeletal: passive and active ROM x 4 extremities. Skin: purpura on distal LE bilaterally and chest. Bruising on the inside of LUE and RUE 2/2 blood pressure cuff use. Neurologic:  Neurovascularly intact without any focal sensory/motor deficits. Cranial nerves: II-XII intact, grossly non-focal.  Psychiatric: Alert and oriented, thought content appropriate, normal insight. Understands his disease process. Capillary Refill: Brisk,< 3 seconds   Peripheral Pulses: +2 palpable, equal bilaterally     Labs:   Recent Labs     07/10/23  0623 07/11/23  0322 07/12/23  0320   WBC 5.4 8.4 8.5   HGB 12.9* 11.7* 11.6*   HCT 39.2* 34.1* 34.2*   PLT 2* 15* 56*       Recent Labs     07/10/23  0623 07/11/23  0322 07/12/23  0320    136 135   K 5.0 4.4 4.5    104 103   CO2 17* 21* 23   BUN 30* 30* 27*   CREATININE 1.1 1.0 0.9   CALCIUM 8.8 8.7 8.7       Recent Labs     07/09/23  2239   AST 21   ALT 32   BILIDIR <0.2   BILITOT 0.5   ALKPHOS 29*       Recent Labs     07/09/23  2239   INR 0.91       No results for input(s): CKTOTAL, TROPONINI in the last 72 hours.     Microbiology:    Blood culture #1: No results found for: BC    Blood culture #2:No results found for: BLOODCULT2    Organism:  Lab Results   Component Value Date/Time    ORG Morganella morganii ssp. morganii 09/20/2021 01:43 PM         No results found for: LABGRAM    MRSA culture only:No results found for: [facility]    Urine culture:   Lab Results   Component Value Date/Time    LABURIN [place] count: <10,000 CFU/mL 09/20/2021 01:43 PM       Respiratory culture: No results found for: CULTRESP    Aerobic and Anaerobic :  No results found for: LABAERO  No results found for: LABANAE    Urinalysis:      Lab Results   Component Value Date/Time    NITRU Negative 10/26/2021 08:19 AM

## 2023-07-12 NOTE — CARE COORDINATION
7/12/23, 8:22 AM EDT    DISCHARGE ON GOING [facility] day: 1  Location: -08/008-A Reason for admit: Drug-induced ITP [D69.59, T50.905A]  Petechiae [R23.3]  Thrombocytopenia ([address]) [D69.6]   Procedure:   7/9 CT Head WO Contrast   1. No acute intracranial abnormality. Age-appropriate exam.   2. Stable intracranial exam.   7/11 ECHO  Barriers to Discharge: Platelets 56 (15 yesterday), diabetes management, Hematology following. Lipitor, Lasix, IV Solu medrol, Lopressor, Zofran prn, Sanctura. PCP: [facility]  Readmission Risk Score: 14.6%  Patient Goals/Plan/Treatment Preferences: Plan is to return home with spouse. Denies needs. Will follow.

## 2023-07-12 NOTE — FLOWSHEET NOTE
07/12/23 1011   Safe Environment   Safety Measures Other (comment)  (VN safety round)     VN called into patients room and introduced myself and role. Patient answered and permitted video. Video activated. . Patient up in chair. . Patient voiced no needs or concerns at this time. Call light within reach. Staff at bedside at this time.

## 2023-07-12 NOTE — PROGRESS NOTES
Hematology:   was readmitted with severe thrombocytopenia. He was discharged from the hospital last week after being treated with high-dose steroids for drug related immune thrombocytopenia. The patient did receive this admission high-dose steroids with a combination of gammaglobulins. He has a platelet count today of 59,000. It would be safe to discharge him. The gammaglobulin effects will last most of the time 3 to 4 weeks. It is okay with me to discharge him today. I will see the patient in my office on 7/7/18/2023 for follow-up visit.

## 2023-07-12 NOTE — FLOWSHEET NOTE
07/12/23 1310   Safe Environment   Safety Measures Other (comment)  (VN safety round)     VN called into patients room and introduced myself and role. Conversation in room happening at this time. VN did not continue to interrupt.

## 2023-07-13 VITALS
SYSTOLIC BLOOD PRESSURE: 162 MMHG | OXYGEN SATURATION: 96 % | WEIGHT: 292.7 LBS | RESPIRATION RATE: 18 BRPM | TEMPERATURE: 97.9 F | DIASTOLIC BLOOD PRESSURE: 70 MMHG | BODY MASS INDEX: 39.65 KG/M2 | HEIGHT: 72 IN | HEART RATE: 60 BPM

## 2023-07-13 LAB
ANION GAP SERPL CALC-SCNC: 12 MEQ/L (ref 8–16)
BUN SERPL-MCNC: 28 MG/DL (ref 7–22)
CALCIUM SERPL-MCNC: 8.3 MG/DL (ref 8.5–10.5)
CHLORIDE SERPL-SCNC: 101 MEQ/L (ref 98–111)
CO2 SERPL-SCNC: 22 MEQ/L (ref 23–33)
CREAT SERPL-MCNC: 0.9 MG/DL (ref 0.4–1.2)
DEPRECATED RDW RBC AUTO: 43.1 FL (ref 35–45)
EKG ATRIAL RATE: 73 BPM
EKG P AXIS: 23 DEGREES
EKG P-R INTERVAL: 148 MS
EKG Q-T INTERVAL: 378 MS
EKG QRS DURATION: 90 MS
EKG QTC CALCULATION (BAZETT): 416 MS
EKG R AXIS: 12 DEGREES
EKG T AXIS: 27 DEGREES
EKG VENTRICULAR RATE: 73 BPM
ERYTHROCYTE [DISTWIDTH] IN BLOOD BY AUTOMATED COUNT: 11.7 % (ref 11.5–14.5)
GFR SERPL CREATININE-BSD FRML MDRD: > 60 ML/MIN/1.73M2
GLUCOSE BLD STRIP.AUTO-MCNC: 211 MG/DL (ref 70–108)
GLUCOSE BLD STRIP.AUTO-MCNC: 235 MG/DL (ref 70–108)
GLUCOSE BLD STRIP.AUTO-MCNC: 301 MG/DL (ref 70–108)
GLUCOSE SERPL-MCNC: 251 MG/DL (ref 70–108)
HCT VFR BLD AUTO: 35.6 % (ref 42–52)
HGB BLD-MCNC: 11.8 GM/DL (ref 14–18)
LACTATE SERPL-SCNC: 3 MMOL/L (ref 0.5–2)
MCH RBC QN AUTO: 33.4 PG (ref 26–33)
MCHC RBC AUTO-ENTMCNC: 33.1 GM/DL (ref 32.2–35.5)
MCV RBC AUTO: 100.8 FL (ref 80–94)
PLATELET # BLD AUTO: 88 THOU/MM3 (ref 130–400)
PMV BLD AUTO: 11.3 FL (ref 9.4–12.4)
POTASSIUM SERPL-SCNC: 4.1 MEQ/L (ref 3.5–5.2)
RBC # BLD AUTO: 3.53 MILL/MM3 (ref 4.7–6.1)
SODIUM SERPL-SCNC: 135 MEQ/L (ref 135–145)
WBC # BLD AUTO: 7.8 THOU/MM3 (ref 4.8–10.8)

## 2023-07-13 PROCEDURE — 6370000000 HC RX 637 (ALT 250 FOR IP)

## 2023-07-13 PROCEDURE — 80048 BASIC METABOLIC PNL TOTAL CA: CPT

## 2023-07-13 PROCEDURE — 6370000000 HC RX 637 (ALT 250 FOR IP): Performed by: STUDENT IN AN ORGANIZED HEALTH CARE EDUCATION/TRAINING PROGRAM

## 2023-07-13 PROCEDURE — 99238 HOSP IP/OBS DSCHRG MGMT 30/<: CPT | Performed by: INTERNAL MEDICINE

## 2023-07-13 PROCEDURE — 2580000003 HC RX 258

## 2023-07-13 PROCEDURE — 85027 COMPLETE CBC AUTOMATED: CPT

## 2023-07-13 PROCEDURE — 82948 REAGENT STRIP/BLOOD GLUCOSE: CPT

## 2023-07-13 PROCEDURE — 36415 COLL VENOUS BLD VENIPUNCTURE: CPT

## 2023-07-13 PROCEDURE — 83605 ASSAY OF LACTIC ACID: CPT

## 2023-07-13 RX ORDER — INSULIN GLARGINE 100 [IU]/ML
30 INJECTION, SOLUTION SUBCUTANEOUS ONCE
Status: COMPLETED | OUTPATIENT
Start: 2023-07-13 | End: 2023-07-13

## 2023-07-13 RX ADMIN — Medication 2000 UNITS: at 07:43

## 2023-07-13 RX ADMIN — INSULIN LISPRO 3 UNITS: 100 INJECTION, SOLUTION INTRAVENOUS; SUBCUTANEOUS at 11:11

## 2023-07-13 RX ADMIN — INSULIN LISPRO 7 UNITS: 100 INJECTION, SOLUTION INTRAVENOUS; SUBCUTANEOUS at 07:39

## 2023-07-13 RX ADMIN — LISINOPRIL 40 MG: 40 TABLET ORAL at 07:43

## 2023-07-13 RX ADMIN — INSULIN LISPRO 1 UNITS: 100 INJECTION, SOLUTION INTRAVENOUS; SUBCUTANEOUS at 16:27

## 2023-07-13 RX ADMIN — SODIUM CHLORIDE, POTASSIUM CHLORIDE, SODIUM LACTATE AND CALCIUM CHLORIDE: 600; 310; 30; 20 INJECTION, SOLUTION INTRAVENOUS at 10:11

## 2023-07-13 RX ADMIN — INSULIN LISPRO 1 UNITS: 100 INJECTION, SOLUTION INTRAVENOUS; SUBCUTANEOUS at 07:39

## 2023-07-13 RX ADMIN — INSULIN LISPRO 7 UNITS: 100 INJECTION, SOLUTION INTRAVENOUS; SUBCUTANEOUS at 11:11

## 2023-07-13 RX ADMIN — INSULIN LISPRO 7 UNITS: 100 INJECTION, SOLUTION INTRAVENOUS; SUBCUTANEOUS at 16:27

## 2023-07-13 RX ADMIN — LEVOTHYROXINE SODIUM 25 MCG: 0.03 TABLET ORAL at 05:31

## 2023-07-13 RX ADMIN — INSULIN GLARGINE 30 UNITS: 100 INJECTION, SOLUTION SUBCUTANEOUS at 16:26

## 2023-07-13 RX ADMIN — TROSPIUM CHLORIDE 20 MG: 20 TABLET, FILM COATED ORAL at 07:43

## 2023-07-13 ASSESSMENT — PAIN SCALES - GENERAL: PAINLEVEL_OUTOF10: 0

## 2023-07-13 NOTE — DISCHARGE SUMMARY
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 Potential ingredients that may have caused this: chromium, and (most likely) licorice root. Patient was informed on this and will avoid licorice root in the future. Treated here for a second time in <1 week for same problem. Present with oral mucosal blood blisters similar to what he had last week as well as petechiae on his upper shins and chest. Stopped taking Glucotrust 7/3/23. Platelets 2K on arrival. B12, folate, CRP, fibrinogen WNL. Started on Solu-medrol 500mg daily was well as 2 days of Gamunex. Repeat platelets today were 88. Oral blisters remarkedly improved. Patient asymptomatic and feeling well. Heme/Onc also on board and patint will follow up with him on 23.    -Discontinue metformin   - Hematology/Oncology following. They cleared the patient from their standpoint and want to see him 23 in his office. #Lactic Acidosis, Type B: likely 2/2 metformin and high dose steroids. . LA on arrival 4.9. Currently 3. BUN elevated at 28. AG 12, Cr 0.9. Discontinued metformin while here              -Discontinue metformin on discharge     #Diabetes Mellitus 2: fairly well controlled at home with metformin and pioglitazone. A1c 7/10/23 6.9. BG very high yesterday secondary to high dose steroids. Will likely have to go home on insulin 2/2 continued steroid use.   this morning.  - Give 30units lantus prior to dinner then discharge home  - Discontinue metformin, but continue pioglitazone  - F/u with PCP for further DM management     #Abnormal EKG: 07/13/2023 04:17 AM    CO2 22 07/13/2023 04:17 AM    BUN 28 07/13/2023 04:17 AM    CREATININE 0.9 07/13/2023 04:17 AM    CALCIUM 8.3 07/13/2023 04:17 AM     Liver:   Lab Results   Component Value Date/Time    AST 21 07/09/2023 10:39 PM    ALT 32 07/09/2023 10:39 PM         Significant Diagnostic Studies    Radiology:   CT HEAD WO CONTRAST   Final Result   Impression:   1. No acute intracranial abnormality. Age-appropriate exam.   2. Stable intracranial exam.      This document has been electronically signed by: Miri Guerra MD on    07/09/2023 11:47 PM      All CTs at this facility use dose modulation techniques and iterative    reconstructions, and/or weight-based dosing   when appropriate to reduce radiation to a low as reasonably achievable. Consults:   IP CONSULT TO ONCOLOGY    Disposition: Home  Condition at Discharge: Stable    Code Status:  Full Code     Patient Instructions:    Discharge lab work: none, f/u with PCP and Dr. Pham Stewart for further management next week . Activity: activity as tolerated  Diet: ADULT DIET; Regular; 3 carb choices (45 gm/meal)      Follow-up visits:   [facility]  [address] [facility] [address] [place] [address]  [phone]    Follow up      Aidee Tam, [address]  [phone]    Call today  Make appointment with DR. Pham Stewart on Tuesday 7/18/23 in the afternoon.          Discharge Medications:      Medication List        CONTINUE taking these medications      atorvastatin 80 MG tablet  Commonly known as: LIPITOR  Take 1 tablet by mouth nightly     benazepril 20 MG tablet  Commonly known as: LOTENSIN     CENTRUM SILVER ADULT 50+ PO     ELDERBERRY PO     fenofibrate 145 MG tablet  Commonly known as: TRICOR     furosemide 20 MG tablet  Commonly known as: LASIX     levothyroxine 25 MCG tablet  Commonly known as: SYNTHROID     Lysine HCl 500 MG Caps     magnesium 250 MG Tabs tablet  Commonly known as: MAGNESIUM-OXIDE

## 2023-07-13 NOTE — CARE COORDINATION
7/13/23, 1:45 PM EDT    Patient goals/plan/ treatment preferences discussed by  and . Patient goals/plan/ treatment preferences reviewed with patient/ family. Patient/ family verbalize understanding of discharge plan and are in agreement with goal/plan/treatment preferences. Understanding was demonstrated using the teach back method. AVS provided by RN at time of discharge, which includes all necessary medical information pertaining to the patients current course of illness, treatment, post-discharge goals of care, and treatment preferences. Services At/After Discharge: None  Pt to be discharged to home with spouse. He denies needs or services.         IMM Letter  IMM Letter given to Patient/Family/Significant other/Guardian/POA/by[de-identified] CM  IMM Letter date given[de-identified] 07/11/23  IMM Letter time given[de-identified] 2466  Observation Status Letter date given[de-identified] 07/09/23  Observation Status Letter time given[de-identified] 9102

## 2023-07-13 NOTE — PLAN OF CARE
Problem: Discharge Planning  Goal: Discharge to home or other facility with appropriate resources  Outcome: Adequate for Discharge  Flowsheets (Taken 7/13/2023 0825)  Discharge to home or other facility with appropriate resources:   Identify barriers to discharge with patient and caregiver   Arrange for needed discharge resources and transportation as appropriate   Identify discharge learning needs (meds, wound care, etc)     Problem: Pain  Goal: Verbalizes/displays adequate comfort level or baseline comfort level  Outcome: Adequate for Discharge  Flowsheets (Taken 7/13/2023 0730)  Verbalizes/displays adequate comfort level or baseline comfort level:   Encourage patient to monitor pain and request assistance   Assess pain using appropriate pain scale   Administer analgesics based on type and severity of pain and evaluate response   Implement non-pharmacological measures as appropriate and evaluate response     Problem: Safety - Adult  Goal: Free from fall injury  Outcome: Adequate for Discharge     Problem: ABCDS Injury Assessment  Goal: Absence of physical injury  Outcome: Adequate for Discharge     Problem: Cardiovascular - Adult  Goal: Absence of cardiac dysrhythmias or at baseline  Outcome: Adequate for Discharge  Flowsheets (Taken 7/13/2023 0825)  Absence of cardiac dysrhythmias or at baseline: Monitor cardiac rate and rhythm     Problem: Skin/Tissue Integrity - Adult  Goal: Skin integrity remains intact  Outcome: Adequate for Discharge  Flowsheets (Taken 7/13/2023 0825)  Skin Integrity Remains Intact: Monitor for areas of redness and/or skin breakdown     Problem: Skin/Tissue Integrity - Adult  Goal: Oral mucous membranes remain intact  Outcome: Adequate for Discharge  Flowsheets (Taken 7/13/2023 0825)  Oral Mucous Membranes Remain Intact: Assess oral mucosa and hygiene practices     Problem: Musculoskeletal - Adult  Goal: Return mobility to safest level of function  Outcome: Adequate for

## 2023-07-13 NOTE — DISCHARGE INSTRUCTIONS
Metformin discontinued on discharge for lactic acidosis. Please see PCP for further management of diabetic mediation. Steroids will be discontinued at discharge per Dr. Bakari Bauer, please see Dr. Bakari Bauer in his office within 1 week for follow-up.

## 2023-07-13 NOTE — PLAN OF CARE
needs:   Assess nutritional status and recommend course of action   Monitor oral intake, labs, and treatment plans   Care plan reviewed with patient. Patient verbalizes understanding of the plan of care and contribute to goal setting.

## 2023-07-13 NOTE — FLOWSHEET NOTE
07/13/23 0853   Safe Environment   Safety Measures Fall prevention (comment); Call light within reach; Side rails X 2;Standard Safety Measures  (Virtual nurse safety round completed.)     Patient is awake and  alert and answers questions. Referred patient to page 13 of the handbook for fall prevention review. Call light in reach.

## 2023-07-13 NOTE — PROGRESS NOTES
Pt was sitting on the chair beside hs bed. He was dealing with drug - induced ITP. He was hopeful and trusted the [address] well. He wanted prayer to cope and heal. Prayer was appreciated.     07/13/23 1243   Encounter Summary   Encounter Overview/Reason  Initial Encounter   Service Provided For: Patient   Referral/Consult From: [address] Family members   Last Encounter  07/13/23   Complexity of Encounter Low   Begin Time 1150   End Time  1159   Total Time Calculated 9 min   Spiritual/Emotional needs   Type Spiritual Support   Assessment/Intervention/Outcome   Assessment Calm   Intervention Empowerment   Outcome Engaged in conversation;Encouraged

## 2023-07-13 NOTE — FLOWSHEET NOTE
07/13/23 1965   Safe Environment   Safety Measures Bed in low position;Call light within reach; Fall prevention (comment); Side rails X 2;Standard Safety Measures  (Virtual nurse safety round completed.)      Patient is awake and  alert and answers questions. No distress noted. Referred patient to page 13 of the handbook for fall prevention review. Call light in reach.

## 2023-08-29 ENCOUNTER — TELEPHONE (OUTPATIENT)
Dept: CARDIOLOGY CLINIC | Age: 73
End: 2023-08-29

## 2023-08-29 NOTE — TELEPHONE ENCOUNTER
Pre-Op Risk Assessment    Procedure: Colonoscopy  Physician: Marietta Almeida DO  Date of surgery/procedure: 09/06/23    Last Office Visit: 03/28/23  Last Stress: 01/25/23  Last Echo: 07/09/23  Last Cath:   Last Stent:   Is patient on blood thinners? None  Hold meds/how many days    Okay for colonoscopy?

## 2023-10-02 NOTE — CARE COORDINATION
7/11/23, 1:47 PM EDT    DISCHARGE ON GOING [facility] day: 0  Location: 4A-08/008-A Reason for admit: Drug-induced ITP [D69.59, T50.905A]  Petechiae [R23.3]  Thrombocytopenia ([address]) [D69.6]   Procedure:   7/9 CT Head WO Contrast   1. No acute intracranial abnormality. Age-appropriate exam.   2. Stable intracranial exam.   7/11 ECHO  Barriers to Discharge: Diabetes management, Platelets 15 (2 yesterday). Oncology following. Lipitor, Lasix, IV IG, Lisinopril, IV Solu medrol, Lopressor, Zofran prn, Sanctura. PCP: [facility]  Readmission Risk Score: 15.2%  Patient Goals/Plan/Treatment Preferences: Plan is to return home with spouse. Denies needs. Will follow. bedside RN requested [facility] referral

## 2023-10-18 ENCOUNTER — OFFICE VISIT (OUTPATIENT)
Dept: UROLOGY | Age: 73
End: 2023-10-18
Payer: MEDICARE

## 2023-10-18 VITALS
BODY MASS INDEX: 39.68 KG/M2 | WEIGHT: 293 LBS | HEIGHT: 72 IN | DIASTOLIC BLOOD PRESSURE: 72 MMHG | SYSTOLIC BLOOD PRESSURE: 146 MMHG

## 2023-10-18 DIAGNOSIS — R39.15 URGENCY OF URINATION: Primary | ICD-10-CM

## 2023-10-18 DIAGNOSIS — C61 PROSTATE CANCER (HCC): ICD-10-CM

## 2023-10-18 LAB
BILIRUBIN, POC: NORMAL
BLOOD URINE, POC: NORMAL
CLARITY, POC: CLEAR
COLOR, POC: YELLOW
GLUCOSE URINE, POC: NORMAL
KETONES, POC: NORMAL
LEUKOCYTE EST, POC: NORMAL
NITRITE, POC: NORMAL
PH, POC: 7
POST VOID RESIDUAL (PVR): 3 ML
PROTEIN, POC: NORMAL
SPECIFIC GRAVITY, POC: 1.02
UROBILINOGEN, POC: 0.2

## 2023-10-18 PROCEDURE — 3017F COLORECTAL CA SCREEN DOC REV: CPT | Performed by: NURSE PRACTITIONER

## 2023-10-18 PROCEDURE — 81003 URINALYSIS AUTO W/O SCOPE: CPT | Performed by: NURSE PRACTITIONER

## 2023-10-18 PROCEDURE — 1036F TOBACCO NON-USER: CPT | Performed by: NURSE PRACTITIONER

## 2023-10-18 PROCEDURE — 3078F DIAST BP <80 MM HG: CPT | Performed by: NURSE PRACTITIONER

## 2023-10-18 PROCEDURE — G8417 CALC BMI ABV UP PARAM F/U: HCPCS | Performed by: NURSE PRACTITIONER

## 2023-10-18 PROCEDURE — 51798 US URINE CAPACITY MEASURE: CPT | Performed by: NURSE PRACTITIONER

## 2023-10-18 PROCEDURE — G8484 FLU IMMUNIZE NO ADMIN: HCPCS | Performed by: NURSE PRACTITIONER

## 2023-10-18 PROCEDURE — 99214 OFFICE O/P EST MOD 30 MIN: CPT | Performed by: NURSE PRACTITIONER

## 2023-10-18 PROCEDURE — 1123F ACP DISCUSS/DSCN MKR DOCD: CPT | Performed by: NURSE PRACTITIONER

## 2023-10-18 PROCEDURE — 3077F SYST BP >= 140 MM HG: CPT | Performed by: NURSE PRACTITIONER

## 2023-10-18 PROCEDURE — G8427 DOCREV CUR MEDS BY ELIG CLIN: HCPCS | Performed by: NURSE PRACTITIONER

## 2023-10-18 RX ORDER — BLOOD SUGAR DIAGNOSTIC
1 STRIP MISCELLANEOUS DAILY
COMMUNITY
Start: 2023-08-01

## 2023-10-18 ASSESSMENT — ENCOUNTER SYMPTOMS
VOMITING: 0
NAUSEA: 0
BACK PAIN: 0
ABDOMINAL PAIN: 0

## 2023-10-18 NOTE — PROGRESS NOTES
UA POC NEG     Urobilinogen, UA 0.2     Leukocytes, UA NEG     Nitrite, UA NEG    poct post void residual   Result Value Ref Range    post void residual 3 ml         Patients recent PSA values are as follows  Lab Results   Component Value Date    PSA  05/10/2023      Comment:      <0.03    PSA  02/10/2021      Comment:      <0.064    PSA  03/02/2020      Comment:      <0.03        Recent BUN/Creatinine:  Lab Results   Component Value Date/Time    BUN 28 07/13/2023 04:17 AM    CREATININE 0.9 07/13/2023 04:17 AM       Assessment:   Prostate cancer  Urinary frequency, urgency, improved  Hx of bladder neck contracture  Erectile dysfunction  Hx kidney stones    Plan:     Frequency, urgency improved on trospium. Continue at this time. Provide Goodrx coupons. F/u in May with PSA a few days prior. Call if any worsening in symptoms prior.

## 2023-10-24 ENCOUNTER — OFFICE VISIT (OUTPATIENT)
Dept: CARDIOLOGY CLINIC | Age: 73
End: 2023-10-24
Payer: MEDICARE

## 2023-10-24 VITALS
SYSTOLIC BLOOD PRESSURE: 158 MMHG | WEIGHT: 289 LBS | HEART RATE: 60 BPM | HEIGHT: 72 IN | DIASTOLIC BLOOD PRESSURE: 86 MMHG | BODY MASS INDEX: 39.14 KG/M2

## 2023-10-24 DIAGNOSIS — E78.01 FAMILIAL HYPERCHOLESTEROLEMIA: ICD-10-CM

## 2023-10-24 DIAGNOSIS — I10 PRIMARY HYPERTENSION: Primary | ICD-10-CM

## 2023-10-24 PROCEDURE — G8484 FLU IMMUNIZE NO ADMIN: HCPCS | Performed by: NUCLEAR MEDICINE

## 2023-10-24 PROCEDURE — G8417 CALC BMI ABV UP PARAM F/U: HCPCS | Performed by: NUCLEAR MEDICINE

## 2023-10-24 PROCEDURE — 3017F COLORECTAL CA SCREEN DOC REV: CPT | Performed by: NUCLEAR MEDICINE

## 2023-10-24 PROCEDURE — 1123F ACP DISCUSS/DSCN MKR DOCD: CPT | Performed by: NUCLEAR MEDICINE

## 2023-10-24 PROCEDURE — G8427 DOCREV CUR MEDS BY ELIG CLIN: HCPCS | Performed by: NUCLEAR MEDICINE

## 2023-10-24 PROCEDURE — 1036F TOBACCO NON-USER: CPT | Performed by: NUCLEAR MEDICINE

## 2023-10-24 PROCEDURE — 99213 OFFICE O/P EST LOW 20 MIN: CPT | Performed by: NUCLEAR MEDICINE

## 2023-10-24 PROCEDURE — 3077F SYST BP >= 140 MM HG: CPT | Performed by: NUCLEAR MEDICINE

## 2023-10-24 PROCEDURE — 3079F DIAST BP 80-89 MM HG: CPT | Performed by: NUCLEAR MEDICINE

## 2023-10-24 NOTE — PROGRESS NOTES
[address] [facility]  [address]  Dept: [phone]  Dept Fax: [phone]  Loc: [phone]    Visit Date: 10/24/2023    Nimesh Prater is a 72 y.o. 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 Jake Rosa     History of kidney stones     Hypertension     DELMER on CPAP     Prostate cancer ([address])     Shingles 09/04/2016    Strain of quadriceps muscle     left    Thyroid disease       Past Surgical History:   Procedure Laterality Date    COLONOSCOPY  03/10/2017    Dr. Diamond Jones  03/20/2017    bladder neck incision    HERNIA REPAIR  1609    umbilical--Dr. Coburn    LEG TENDON SURGERY  03/2008    left quad muscle repair    NECK SURGERY  09/2017    PROSTATE SURGERY  12/2008    Dr. Bran Orozco    as child     Family History   Problem Relation Age of Onset    Dementia Father     No Known Problems Sister     Cancer Brother         prostate     Social History     Tobacco Use    Smoking status: Never    Smokeless tobacco: Never   Substance Use Topics    Alcohol use:  Yes     Alcohol/week: 0.0 standard drinks of alcohol     Comment: 1 beer a month      Current Outpatient Medications   Medication Sig Dispense Refill    metFORMIN (GLUCOPHAGE) 500 MG tablet       blood glucose test strips (ONETOUCH VERIO) strip 1 strip by NOT APPLICABLE route daily      metoprolol tartrate (LOPRESSOR) 25 MG tablet Take 1 tablet by mouth 2 times daily      trospium (SANCTURA) 20 MG tablet Take 1 tablet by mouth 2

## 2023-10-24 NOTE — PROGRESS NOTES
Pt C/O no cardiac symptoms at this time      Pt denies CP, SOB, Headache, dizziness, heart palpitations, swelling, fatigue

## 2024-05-13 RX ORDER — TROSPIUM CHLORIDE 20 MG/1
20 TABLET, FILM COATED ORAL 2 TIMES DAILY
Qty: 180 TABLET | Refills: 3 | OUTPATIENT
Start: 2024-05-13

## 2024-05-13 RX ORDER — TROSPIUM CHLORIDE 20 MG/1
20 TABLET, FILM COATED ORAL 2 TIMES DAILY
Qty: 180 TABLET | Refills: 3 | Status: SHIPPED | OUTPATIENT
Start: 2024-05-13

## 2024-05-13 NOTE — TELEPHONE ENCOUNTER
Ashok Blevins called requesting a refill on the following medications:  Requested Prescriptions     Pending Prescriptions Disp Refills    trospium (SANCTURA) 20 MG tablet 180 tablet 3     Sig: Take 1 tablet by mouth 2 times daily   Pt would like a 90 day supply  Pharmacy verified:[facility] [phone]  .pv      Date of last visit: 10.18.2023  Date of next visit (if applicable): 05.30.2024

## 2024-05-13 NOTE — TELEPHONE ENCOUNTER
Ashok Blevins called requesting a refill on the following medications:  Requested Prescriptions     Pending Prescriptions Disp Refills    trospium (SANCTURA) 20 MG tablet [Pharmacy Med Name: TROSPIUM CHLORIDE 20 MG TABLET] 180 tablet 3     Sig: TAKE 1 TABLET BY MOUTH TWICE A DAY     Pharmacy verified:  .[email or web address]      Date of last visit: 10/18/2023  Date of next visit (if applicable): 5/30/2024

## 2024-05-30 ENCOUNTER — OFFICE VISIT (OUTPATIENT)
Dept: UROLOGY | Age: 74
End: 2024-05-30
Payer: MEDICARE

## 2024-05-30 VITALS
HEIGHT: 72 IN | DIASTOLIC BLOOD PRESSURE: 64 MMHG | SYSTOLIC BLOOD PRESSURE: 168 MMHG | BODY MASS INDEX: 41.42 KG/M2 | WEIGHT: 305.8 LBS

## 2024-05-30 DIAGNOSIS — C61 PROSTATE CANCER (HCC): Primary | ICD-10-CM

## 2024-05-30 LAB
BILIRUBIN, POC: NEGATIVE
BLOOD URINE, POC: NEGATIVE
CLARITY, POC: CLEAR
COLOR, POC: YELLOW
GLUCOSE URINE, POC: NEGATIVE
KETONES, POC: NEGATIVE
LEUKOCYTE EST, POC: NEGATIVE
NITRITE, POC: NEGATIVE
PH, POC: 5.5
PROTEIN, POC: NEGATIVE
SPECIFIC GRAVITY, POC: 1.02
UROBILINOGEN, POC: 0.2

## 2024-05-30 PROCEDURE — 3017F COLORECTAL CA SCREEN DOC REV: CPT | Performed by: NURSE PRACTITIONER

## 2024-05-30 PROCEDURE — G8417 CALC BMI ABV UP PARAM F/U: HCPCS | Performed by: NURSE PRACTITIONER

## 2024-05-30 PROCEDURE — 3078F DIAST BP <80 MM HG: CPT | Performed by: NURSE PRACTITIONER

## 2024-05-30 PROCEDURE — 81003 URINALYSIS AUTO W/O SCOPE: CPT | Performed by: NURSE PRACTITIONER

## 2024-05-30 PROCEDURE — 99214 OFFICE O/P EST MOD 30 MIN: CPT | Performed by: NURSE PRACTITIONER

## 2024-05-30 PROCEDURE — 1123F ACP DISCUSS/DSCN MKR DOCD: CPT | Performed by: NURSE PRACTITIONER

## 2024-05-30 PROCEDURE — G8427 DOCREV CUR MEDS BY ELIG CLIN: HCPCS | Performed by: NURSE PRACTITIONER

## 2024-05-30 PROCEDURE — 1036F TOBACCO NON-USER: CPT | Performed by: NURSE PRACTITIONER

## 2024-05-30 PROCEDURE — 3077F SYST BP >= 140 MM HG: CPT | Performed by: NURSE PRACTITIONER

## 2024-05-30 RX ORDER — MECLIZINE HCL 12.5 MG/1
12.5 TABLET ORAL 3 TIMES DAILY PRN
COMMUNITY
Start: 2024-02-21

## 2024-05-30 ASSESSMENT — ENCOUNTER SYMPTOMS
ABDOMINAL PAIN: 0
BACK PAIN: 0
VOMITING: 0
NAUSEA: 0

## 2024-05-30 NOTE — PROGRESS NOTES
[facility] [place] [facility]  [facility]  [address] [place]  [facility] [address]  Dept: [phone]  Loc: [phone]    Visit Date: 5/30/2024        HPI:     Ashok Blevins is a 73 y.o. male who presents today for:  Chief Complaint   Patient presents with    Prostate Cancer     PSA       HPI    Pt seen in follow up for urinary frequency, urgency, dysuria.       Pt has a hx of prostate cancer and bladder neck contracture.  He underwent RALRP 12/2008.  Mayuri 3+4=7 [id number] prostate cancer.  He underwent TURBN for bladder neck contracture 3/2017.  Also has a hx of kidney stones, ED and decreased libido.       Pt was started on Trospium in May 2021 for urinary frequency and urgency with improvement in symptoms.  Reported discomfort at the end of the penis and increased urinary frequency and discomfort with urination.  Urine culture 9/20/21 with a small amount of Morganella morganii treated with antibiotics.  Symptoms continued despite treatment with antibiotics and underwent cystoscopy 10/1/21 by Dr. Guzmán showing no abnormalities of the urethra or bladder neck contracture, some mild bladder wall trabeculation.       Currently pt is taking trospium 20 mg BID and reports frequency and urgency controlled.  No burning with urination.  No hematuria.  Does have some worsening odor to urine.       Current Outpatient Medications   Medication Sig Dispense Refill    meclizine (ANTIVERT) 12.5 MG tablet Take 1 tablet by mouth 3 times daily as needed      trospium (SANCTURA) 20 MG tablet TAKE 1 TABLET BY MOUTH TWICE A  tablet 3    metFORMIN (GLUCOPHAGE) 500 MG tablet       blood glucose test strips (ONETOUCH VERIO) strip 1 strip by NOT APPLICABLE route daily      metoprolol tartrate (LOPRESSOR) 25 MG tablet Take 1 tablet by mouth 2 times daily      ELDERBERRY PO Take by mouth Gummies 50mg daily  eldderberry 50mg, vit c 45mg, zinc 3.8mg      Potassium (POTASSIMIN) 75 MG TABS Take 1

## 2024-10-29 ENCOUNTER — OFFICE VISIT (OUTPATIENT)
Dept: CARDIOLOGY CLINIC | Age: 74
End: 2024-10-29
Payer: MEDICARE

## 2024-10-29 VITALS
DIASTOLIC BLOOD PRESSURE: 70 MMHG | HEIGHT: 72 IN | SYSTOLIC BLOOD PRESSURE: 174 MMHG | BODY MASS INDEX: 40.09 KG/M2 | HEART RATE: 95 BPM | WEIGHT: 296 LBS

## 2024-10-29 DIAGNOSIS — I49.3 PVC (PREMATURE VENTRICULAR CONTRACTION): ICD-10-CM

## 2024-10-29 DIAGNOSIS — I10 PRIMARY HYPERTENSION: Primary | ICD-10-CM

## 2024-10-29 DIAGNOSIS — E78.01 FAMILIAL HYPERCHOLESTEROLEMIA: ICD-10-CM

## 2024-10-29 PROCEDURE — G8484 FLU IMMUNIZE NO ADMIN: HCPCS | Performed by: NUCLEAR MEDICINE

## 2024-10-29 PROCEDURE — G8428 CUR MEDS NOT DOCUMENT: HCPCS | Performed by: NUCLEAR MEDICINE

## 2024-10-29 PROCEDURE — 1123F ACP DISCUSS/DSCN MKR DOCD: CPT | Performed by: NUCLEAR MEDICINE

## 2024-10-29 PROCEDURE — 1036F TOBACCO NON-USER: CPT | Performed by: NUCLEAR MEDICINE

## 2024-10-29 PROCEDURE — G8417 CALC BMI ABV UP PARAM F/U: HCPCS | Performed by: NUCLEAR MEDICINE

## 2024-10-29 PROCEDURE — 3078F DIAST BP <80 MM HG: CPT | Performed by: NUCLEAR MEDICINE

## 2024-10-29 PROCEDURE — 3017F COLORECTAL CA SCREEN DOC REV: CPT | Performed by: NUCLEAR MEDICINE

## 2024-10-29 PROCEDURE — 3077F SYST BP >= 140 MM HG: CPT | Performed by: NUCLEAR MEDICINE

## 2024-10-29 PROCEDURE — 99213 OFFICE O/P EST LOW 20 MIN: CPT | Performed by: NUCLEAR MEDICINE

## 2024-10-29 NOTE — PROGRESS NOTES
[facility] [place] [facility]  [facility]  [address] [place] [address]  Dept: [phone]  Dept Fax: [phone]  Loc: [phone]    Visit Date: 10/29/2024    Ashok Blevins is a 74 y.o. male who presents todayfor:  Chief Complaint   Patient presents with    Follow-up    Hypertension    Palpitations    Hyperlipidemia   Known PVCs  Seems stable  Not much lately   No chest pain  No changes in breathing  Higher BP today   Usually better   No dizziness  No syncope  On statins for hyperlipidemia        HPI:  HPI  Past Medical History:   Diagnosis Date    Arthritis     neck and knees    Cervical pain 8/10/16, 8/24/16    pain injection @ [facility], Dr. Vásquez     History of kidney stones     Hypertension     DELMER on CPAP     Prostate cancer (HCC)     Shingles 09/04/2016    Strain of quadriceps muscle     left    Thyroid disease       Past Surgical History:   Procedure Laterality Date    COLONOSCOPY  03/10/2017    Dr. Stahl    CYSTOSCOPY  03/20/2017    bladder neck incision    HERNIA REPAIR  2008    umbilical--Dr. Coburn    LEG TENDON SURGERY  03/2008    left quad muscle repair    NECK SURGERY  09/2017    PROSTATE SURGERY  12/2008    Dr. Yip    TONSILLECTOMY  1958    as child     Family History   Problem Relation Age of Onset    Dementia Father     No Known Problems Sister     Cancer Brother         prostate     Social History     Tobacco Use    Smoking status: Never    Smokeless tobacco: Never   Substance Use Topics    Alcohol use: Yes     Alcohol/week: 0.0 standard drinks of alcohol     Comment: 1 beer a month      Current Outpatient Medications   Medication Sig Dispense Refill    meclizine (ANTIVERT) 12.5 MG tablet Take 1 tablet by mouth 3 times daily as needed      trospium (SANCTURA) 20 MG tablet TAKE 1 TABLET BY MOUTH TWICE A  tablet 3    metFORMIN (GLUCOPHAGE) 500 MG tablet       blood glucose test strips (ONETOUCH VERIO) strip 1 strip by NOT

## 2024-11-11 ENCOUNTER — TELEPHONE (OUTPATIENT)
Dept: CARDIOLOGY CLINIC | Age: 74
End: 2024-11-11

## 2024-11-11 NOTE — TELEPHONE ENCOUNTER
10/29- increase metoprolol to 25 mg twice daily  HR 38, fatigue, 134/60  Patient decreased to 12. 5 mg twice daily- tolerating   Med list updated        Advise?

## 2025-01-27 ENCOUNTER — TELEPHONE (OUTPATIENT)
Dept: CARDIOLOGY CLINIC | Age: 75
End: 2025-01-27

## 2025-01-27 DIAGNOSIS — I49.3 VENTRICULAR ECTOPIC BEATS: Primary | ICD-10-CM

## 2025-01-28 NOTE — TELEPHONE ENCOUNTER
Spoke to patient, notified.  Monitor ordered and brought to scheduled.   Pt would prefer to have done at [place].  He will keep an eye on BP.

## 2025-01-28 NOTE — TELEPHONE ENCOUNTER
Okay. The HR is not slow   Its the PVCs making it count wrong  But does have frequent PVCs   Let get a 24 holter  Follow the BP at home   Keep us posted on that

## 2025-01-28 NOTE — TELEPHONE ENCOUNTER
Schedule for 24 hr monitor for 1/29/25, arrival time at 12:45 pm at [facility]     Call to pt, date , time and instructions reviewed with pt.

## 2025-01-28 NOTE — TELEPHONE ENCOUNTER
Pt called and LM in regards to this EKG.  Dr De Anda was concerned about his /79 and is HR was 37 they re-checked it  154/76-44

## 2025-02-03 DIAGNOSIS — I49.3 PVC (PREMATURE VENTRICULAR CONTRACTION): Primary | ICD-10-CM

## 2025-02-03 RX ORDER — FLECAINIDE ACETATE 100 MG/1
100 TABLET ORAL 2 TIMES DAILY
Qty: 180 TABLET | Refills: 3 | Status: SHIPPED | OUTPATIENT
Start: 2025-02-03

## 2025-02-03 NOTE — TELEPHONE ENCOUNTER
Pt returned call  24 hr Holter scheduled at [facility] 04-04-25, arrive 1:15pm  Follow up scheduled 05-13-25 at 3:00pm at [facility]  Pt informed

## 2025-02-03 NOTE — TELEPHONE ENCOUNTER
Left msg for pt to call office to schedule holter in two months with follow up after at [facility]

## 2025-02-03 NOTE — TELEPHONE ENCOUNTER
Marichuy. Start flecainide 100 mg bid  Redo holter 24 in 2 months  Follow up after that   Earlier if needed

## 2025-02-05 DIAGNOSIS — I49.3 VENTRICULAR ECTOPIC BEATS: ICD-10-CM

## 2025-02-24 ENCOUNTER — TELEPHONE (OUTPATIENT)
Dept: CARDIOLOGY CLINIC | Age: 75
End: 2025-02-24

## 2025-02-24 DIAGNOSIS — I49.3 PVC (PREMATURE VENTRICULAR CONTRACTION): ICD-10-CM

## 2025-02-24 DIAGNOSIS — R94.31 ABNORMAL EKG: ICD-10-CM

## 2025-02-24 DIAGNOSIS — I49.9 IRREGULAR HEART RATE: Primary | ICD-10-CM

## 2025-02-24 NOTE — TELEPHONE ENCOUNTER
Started flecainide at the beginning of the month  Not tolerating  Complaining insomnia, itching,  irregular HR  bpm    Advise?

## 2025-02-24 NOTE — TELEPHONE ENCOUNTER
Okay.  Stop flecainide  Start amiodarone 200 mg bid for a week then daily   If more issues after that will refer to lino  Do a follow up godwin in th mean time   Echo if not done in over 6 months

## 2025-02-25 RX ORDER — AMIODARONE HYDROCHLORIDE 200 MG/1
TABLET ORAL
Qty: 90 TABLET | Refills: 3 | Status: SHIPPED | OUTPATIENT
Start: 2025-02-25

## 2025-02-25 RX ORDER — AMIODARONE HYDROCHLORIDE 200 MG/1
200 TABLET ORAL
COMMUNITY
End: 2025-02-25 | Stop reason: SDUPTHER

## 2025-02-25 NOTE — TELEPHONE ENCOUNTER
Pt is scheduled at [facility] for 3/11/25, arrival time 8:45 am     Call to pt , date, time and instructions reviewed with pt and mailed.    Pt had questions regarding having another heart monitor done since he is starting a new medication.

## 2025-02-25 NOTE — TELEPHONE ENCOUNTER
Spoke to patient, notified.   Medlist updated.  Rx pended in separate refill encounter.    Pt doesn't like Lexiscan medication.  Asking to do Cardiolite instead. Okay?    Echo and stress will need ordered.

## 2025-03-11 ENCOUNTER — TELEPHONE (OUTPATIENT)
Dept: CARDIOLOGY CLINIC | Age: 75
End: 2025-03-11

## 2025-03-11 DIAGNOSIS — R94.39 ABNORMAL STRESS TEST: Primary | ICD-10-CM

## 2025-03-11 NOTE — TELEPHONE ENCOUNTER
DATE:  07/14/2021    SUPERVISING SURGEON:  Tre Pérez MD    ASSISTING SURGEON:  Carlo Alejo MD.    PREOPERATIVE DIAGNOSIS:  Rectosigmoid stricture    POSTOPERATIVE DIAGNOSIS:  Severe sigmoid diverticulitis with associated abscesses    PROCEDURE:  Robotic low-anterior resection, drainage of several pelvic abscesses; flexible sigmoidoscopy    SPECIMEN:  Sigmoid colon and upper rectum with adjacent abscesses    FINDINGS:  There was severe inflammation of the sigmoid colon.  Distal sigmoid colon was stuck anteriorly in the pelvis.  There were two separate associated abscesses, one was anterior between the rectum and the bladder and the other one was in the right lateral position.  The specimen was opened on the back table and there were no signs of malignancy.    INDICATIONS FOR PROCEDURE:  This is a 63-year-old male, who recently underwent colonoscopy.  In addition to finding several tubular adenomas there was a rectosigmoid stricture.  Biopsies were taken and these did not reveal malignancy.  The patient was offered a minimally invasive low-anterior resection.  Despite going over the risks and benefits of surgery several times in the preoperative setting, both the patient and his wife were reluctant to agree to surgery this morning in the preoperative holding area.  However, once Dr. Pérez spoke with them and reiterated the need for surgery for the purpose of avoiding a large bowel obstruction necessitating Mary Lou's colostomy as well as ruling out underlying malignancy, they finally agreed.  Also, the risks were reviewed and these included but were not limited to infection, bleeding, pain, anastomotic leak, and injury to adjacent structures.    DESCRIPTION OF PROCEDURE:  After being identified in the preoperative holding area, the patient was brought back to the operating room.  The patient was placed supine on the operating room table.  General anesthesia was induced by the anesthesia team.  SCDs  Marichuy. Jojo    were applied.  IV antibiotics were administered.  Benjamin catheter was placed under sterile technique.  The patient was placed in a modified lithotomy position with the Yellofins stirrups and all the pressure points were appropriately padded.  The abdomen, perineum were prepped and draped in usual sterile fashion.     A Veress needle was used to enter the abdomen at Mcmullen's point.  The abdomen was insufflated to 14 mmHg.  An 8 mm port was placed at the umbilicus and a diagnostic laparoscopy was performed.  No injuries were noted.  There were no obvious signs of metastatic disease in the abdomen.  An additional 8 mm port was placed in the right lower quadrant.  Two more 8 mm ports were placed in the left mid abdomen, roughly at the level of the level of the umbilicus and a 5 mm laparoscopic port was placed in the right upper quadrant. Finally an 8 mm air seal port was placed in the hypogastrium.  All ports were placed under direct vision.  The patient was placed in the Trendelenburg position with the left side up and the Xi robot was docked.    Upon inspection, there were significant inflammatory changes to the sigmoid colon. Particularly the distal sigmoid colon was quite stuck to the anterior pelvis.  We began by defining a plane between the sigmoid colon and the anterior pelvic peritoneum, and we soon countered an anterior abscess with some pus and granulation tissue.  We continued dissecting downward for some distance.  After a while we turned our attention to the lateral attachments of the sigmoid colon.  A monopolar hook cautery was used to enter the plane between the left colon mesentery and the retroperitoneum and this was developed using a combination of blunt dissection and electrocautery.  The retroperitoneal structures such as the gonadal vessels and the ureter were kept down in the retroperitoneum and well out of harm's way.  After we had developed this for quite a while, we turned our attention to the  right portion of the pelvis where the colon was stuck as well.  This was taken down with a hook monopolar cautery. Here we encountered another abscess, this time with a bit more pus than last one; also, there was some granulation tissue. We then began our medial to lateral dissection.  We elevated the colon above the sacral promontory and used monopolar hook cautery to score the peritoneum.  We entered the presacral space and developed this downward. Once we got this a bit more free, we went back anteriorly again and started working down more and eventually we got beyond the inflammation and now we felt that the rectum was sufficiently freed.  We went back to look to see how much reach we had.  Originally, we thought we were going to have to take down the splenic flexure to get adequate reach.  However, upon second look, we felt that we no longer need to do this.  Furthermore, we were quite convinced at this point that this was not an oncologic procedure, but rather just clearly diverticulitis.  Therefore, we decided not to divide the inferior mesenteric artery.     The Xi robot was undocked and the ports were removed.  A Pfannenstiel incision was made, 2 fingerbreadths above the pubis and Arturo wound protector was placed.  We eviscerated the specimen through the Pfannenstiel incision.  We divided the colon with an 80 mm TONA stapler just proximal to the inflammation, and at this point where the colon was nice and pliable and not involved in the inflammatory process.  We began marking down along the specimen, dividing the mesentery off it using the EnSeal device.  The specimen was quite bulky.  However, we were able to get down around it and we chose a distal transection point on the proximal rectum in a place that was also free of disease and distal to the high pressure zone.  We divided all the fat off the mesorectum here using the EnSeal device and the rectum was divided with an 80 mm TONA stapler and the specimen  was completely freed and passed off the table.  We took it to the back table and opened up with a pair of scissors, and with the aid of Pathology, we were confident that there was no cancer in the specimen.  We performed a flexible sigmoidoscopy to be sure that our distal transection point was well below the disease and there was no diverticular disease remaining in the rectum and indeed there was none.    Next, we set to fashion our anastomosis.  We felt that it was safe, the tissue quality was good and at the end there was no infection left. An automatic purse-string device was used to secure the anvil of a 28 mm EEA stapler in the proximal transection point of the colon.  Similarly, the automatic purse-string device was used to secure the dummy head of a 28 mm EEA stapler that was passed through the rectum.  However, when we tied the strings from automatic purse-string device, the sutures broke.  Therefore, we had to fashion a purse-string using a 2-0 Prolene suture.  Next, the anvil and the stapler were mated; the stapler was closed and fired.  The resulting anastomosis had a satisfactory appearance.  There were no signs of undue tension.  We did buttress the outside with several interrupted 3-0 Vicryl sutures.  We performed a flexible sigmoidoscopy.  We examined the anastomosis and it internally looked satisfactory as well.  There were no defects and the anastomosis was hemostatic. Irricept solution was used for irrigation.    All team members changed our gowns and gloves and clean instruments were used for remainder of the case.  A combination of 80 cc of Exparel and bupivacaine was injected into the peritoneum, rectus muscles, fascia, skin and subcutaneous tissue, the Pfannenstiel incision, as well as the laparoscopic port sites.  The peritoneum of the Pfannenstiel incision was closed.  The rectus muscles were loosely reapproximated.  The fascia was closed in a running fashion using 0 looped PDS and the skin  was closed with 4-0 Monocryl in a running fashion.  The laparoscopic port sites and the robotic port sites were closed in subcuticular fashion as well.  Dermabond was applied.  The patient was placed back flat.  General anesthesia was reversed.  A Benjamin catheter was also left in place.  The patient was transferred to recovery room in stable condition.  All sponge, needle, and instrument counts were correct x2.        Dictated By:  Carlo Alejo MD      Dictated For:  Tre Pérez MD      D:  07/14/2021 14:36:23  T:  07/15/2021 00:52:41  DS/modl  Job:  [id number]      cc:  Gali Finnegan MD

## 2025-03-12 DIAGNOSIS — I49.9 IRREGULAR HEART RATE: ICD-10-CM

## 2025-03-12 DIAGNOSIS — I49.3 PVC (PREMATURE VENTRICULAR CONTRACTION): ICD-10-CM

## 2025-03-12 DIAGNOSIS — R94.31 ABNORMAL EKG: ICD-10-CM

## 2025-03-12 NOTE — TELEPHONE ENCOUNTER
Left heart cath scheduled for 3/19/25 with arrival time of 12:00 pm. Patient to hold Metformin, Potassium, Pioglitazone and Furosemide on day of procedure, take all other meds as usual. Case scheduled with Arminda in cath lab.    Left voicemail for patient to return call ASAP to confirm or change date and receive instructions.

## 2025-03-12 NOTE — TELEPHONE ENCOUNTER
Patient returned call, confirmed case date/time, instructions reviewed and mailed this date, patient verbalized understanding.

## 2025-03-13 PROBLEM — R94.39 ABNORMAL STRESS TEST: Status: ACTIVE | Noted: 2025-03-11

## 2025-03-18 ENCOUNTER — PREP FOR PROCEDURE (OUTPATIENT)
Dept: CARDIOLOGY | Age: 75
End: 2025-03-18

## 2025-03-18 RX ORDER — HYDROCORTISONE SODIUM SUCCINATE 100 MG/2ML
200 INJECTION INTRAMUSCULAR; INTRAVENOUS ONCE
Status: CANCELLED | OUTPATIENT
Start: 2025-03-18 | End: 2025-03-18

## 2025-03-18 RX ORDER — DIPHENHYDRAMINE HYDROCHLORIDE 50 MG/ML
50 INJECTION, SOLUTION INTRAMUSCULAR; INTRAVENOUS ONCE
Status: CANCELLED | OUTPATIENT
Start: 2025-03-18 | End: 2025-03-18

## 2025-03-18 RX ORDER — NITROGLYCERIN 0.4 MG/1
0.4 TABLET SUBLINGUAL EVERY 5 MIN PRN
Status: CANCELLED | OUTPATIENT
Start: 2025-03-18

## 2025-03-18 RX ORDER — SODIUM CHLORIDE 0.9 % (FLUSH) 0.9 %
5-40 SYRINGE (ML) INJECTION PRN
Status: CANCELLED | OUTPATIENT
Start: 2025-03-18

## 2025-03-18 RX ORDER — SODIUM CHLORIDE 9 MG/ML
INJECTION, SOLUTION INTRAVENOUS CONTINUOUS
Status: CANCELLED | OUTPATIENT
Start: 2025-03-18

## 2025-03-18 RX ORDER — ASPIRIN 325 MG
325 TABLET ORAL ONCE
Status: CANCELLED | OUTPATIENT
Start: 2025-03-18 | End: 2025-03-18

## 2025-03-18 RX ORDER — SODIUM CHLORIDE 0.9 % (FLUSH) 0.9 %
5-40 SYRINGE (ML) INJECTION EVERY 12 HOURS SCHEDULED
Status: CANCELLED | OUTPATIENT
Start: 2025-03-18

## 2025-03-18 NOTE — PRE-PROCEDURE INSTRUCTIONS
Voicemail left.   Procedure is scheduled for Wednesday, admission time is 1200  Please arrive 15 minutes early  [facility]  NPO after midnight  Bring drivers license and insurance information  Wear comfortable clean clothes  Shower morning of and night before with liquid antibacterial soap  Remove jewelry   May have to stay overnight if have PTCA/stent - bring an overnight bag.  Leave valuables at home such as cash or credit cards  Bring medications in original bottles - do not take diabetic meds days of procedure.  It is OK to take BP and heart meds.  If you take ASA, plavix, effient or brilinta - please take AM of procedure  If your are on anticoagulants such as coumadin/warfarin, eliquis, xarelto or pradaxa - hold as directed by your Dr  Made aware of visitors limit to 2 at a time  Follow all instructions given by your physician  Please notify doctor office if you need to cancel or reschedule your procedure   needed at discharge

## 2025-03-19 ENCOUNTER — HOSPITAL ENCOUNTER (OUTPATIENT)
Age: 75
Setting detail: OUTPATIENT SURGERY
Discharge: HOME | End: 2025-03-19
Attending: NUCLEAR MEDICINE | Admitting: NUCLEAR MEDICINE
Payer: MEDICARE

## 2025-03-19 VITALS
HEART RATE: 74 BPM | TEMPERATURE: 98 F | SYSTOLIC BLOOD PRESSURE: 153 MMHG | WEIGHT: 297.7 LBS | OXYGEN SATURATION: 95 % | RESPIRATION RATE: 19 BRPM | BODY MASS INDEX: 40.32 KG/M2 | DIASTOLIC BLOOD PRESSURE: 73 MMHG | HEIGHT: 72 IN

## 2025-03-19 DIAGNOSIS — R94.39 ABNORMAL STRESS TEST: ICD-10-CM

## 2025-03-19 LAB
ABO GROUP BLD: NORMAL
ANION GAP SERPL CALC-SCNC: 12 MEQ/L (ref 8–16)
APTT PPP: 28.6 SECONDS (ref 22–38)
BUN SERPL-MCNC: 18 MG/DL (ref 8–23)
CALCIUM SERPL-MCNC: 9.6 MG/DL (ref 8.8–10.2)
CHLORIDE SERPL-SCNC: 104 MEQ/L (ref 98–111)
CO2 SERPL-SCNC: 20 MEQ/L (ref 22–29)
CREAT SERPL-MCNC: 1.1 MG/DL (ref 0.7–1.2)
DEPRECATED RDW RBC AUTO: 43.2 FL (ref 35–45)
ECHO BSA: 2.62 M2
EKG ATRIAL RATE: 83 BPM
EKG P AXIS: 3 DEGREES
EKG P-R INTERVAL: 172 MS
EKG Q-T INTERVAL: 404 MS
EKG QRS DURATION: 96 MS
EKG QTC CALCULATION (BAZETT): 474 MS
EKG T AXIS: -22 DEGREES
EKG VENTRICULAR RATE: 83 BPM
ERYTHROCYTE [DISTWIDTH] IN BLOOD BY AUTOMATED COUNT: 11.9 % (ref 11.5–14.5)
GFR SERPL CREATININE-BSD FRML MDRD: 71 ML/MIN/1.73M2
GLUCOSE SERPL-MCNC: 144 MG/DL (ref 74–109)
HCT VFR BLD AUTO: 39.4 % (ref 42–52)
HGB BLD-MCNC: 13.3 GM/DL (ref 14–18)
IAT IGG-SP REAG SERPL QL: NORMAL
INR PPP: 0.97 (ref 0.85–1.13)
MCH RBC QN AUTO: 33.2 PG (ref 26–33)
MCHC RBC AUTO-ENTMCNC: 33.8 GM/DL (ref 32.2–35.5)
MCV RBC AUTO: 98.3 FL (ref 80–94)
PLATELET # BLD AUTO: 267 THOU/MM3 (ref 130–400)
PMV BLD AUTO: 9.9 FL (ref 9.4–12.4)
POTASSIUM SERPL-SCNC: 4.4 MEQ/L (ref 3.5–5.2)
RBC # BLD AUTO: 4.01 MILL/MM3 (ref 4.7–6.1)
RH BLD: NORMAL
SODIUM SERPL-SCNC: 136 MEQ/L (ref 135–145)
WBC # BLD AUTO: 6.4 THOU/MM3 (ref 4.8–10.8)

## 2025-03-19 PROCEDURE — 2709999900 HC NON-CHARGEABLE SUPPLY: Performed by: NUCLEAR MEDICINE

## 2025-03-19 PROCEDURE — 86885 COOMBS TEST INDIRECT QUAL: CPT

## 2025-03-19 PROCEDURE — 6360000002 HC RX W HCPCS: Performed by: NUCLEAR MEDICINE

## 2025-03-19 PROCEDURE — 86901 BLOOD TYPING SEROLOGIC RH(D): CPT

## 2025-03-19 PROCEDURE — 80048 BASIC METABOLIC PNL TOTAL CA: CPT

## 2025-03-19 PROCEDURE — 85610 PROTHROMBIN TIME: CPT

## 2025-03-19 PROCEDURE — 93005 ELECTROCARDIOGRAM TRACING: CPT | Performed by: STUDENT IN AN ORGANIZED HEALTH CARE EDUCATION/TRAINING PROGRAM

## 2025-03-19 PROCEDURE — C1894 INTRO/SHEATH, NON-LASER: HCPCS | Performed by: NUCLEAR MEDICINE

## 2025-03-19 PROCEDURE — C1769 GUIDE WIRE: HCPCS | Performed by: NUCLEAR MEDICINE

## 2025-03-19 PROCEDURE — 7100000010 HC PHASE II RECOVERY - FIRST 15 MIN: Performed by: NUCLEAR MEDICINE

## 2025-03-19 PROCEDURE — 2580000003 HC RX 258: Performed by: STUDENT IN AN ORGANIZED HEALTH CARE EDUCATION/TRAINING PROGRAM

## 2025-03-19 PROCEDURE — 6370000000 HC RX 637 (ALT 250 FOR IP): Performed by: STUDENT IN AN ORGANIZED HEALTH CARE EDUCATION/TRAINING PROGRAM

## 2025-03-19 PROCEDURE — 36415 COLL VENOUS BLD VENIPUNCTURE: CPT

## 2025-03-19 PROCEDURE — 85027 COMPLETE CBC AUTOMATED: CPT

## 2025-03-19 PROCEDURE — 85730 THROMBOPLASTIN TIME PARTIAL: CPT

## 2025-03-19 PROCEDURE — 86900 BLOOD TYPING SEROLOGIC ABO: CPT

## 2025-03-19 PROCEDURE — 99152 MOD SED SAME PHYS/QHP 5/>YRS: CPT | Performed by: NUCLEAR MEDICINE

## 2025-03-19 PROCEDURE — 93010 ELECTROCARDIOGRAM REPORT: CPT | Performed by: INTERNAL MEDICINE

## 2025-03-19 PROCEDURE — 93458 L HRT ARTERY/VENTRICLE ANGIO: CPT | Performed by: NUCLEAR MEDICINE

## 2025-03-19 PROCEDURE — 6360000004 HC RX CONTRAST MEDICATION: Performed by: NUCLEAR MEDICINE

## 2025-03-19 PROCEDURE — 7100000011 HC PHASE II RECOVERY - ADDTL 15 MIN: Performed by: NUCLEAR MEDICINE

## 2025-03-19 RX ORDER — SODIUM CHLORIDE 9 MG/ML
INJECTION, SOLUTION INTRAVENOUS CONTINUOUS
Status: DISCONTINUED | OUTPATIENT
Start: 2025-03-19 | End: 2025-03-19 | Stop reason: HOSPADM

## 2025-03-19 RX ORDER — CYANOCOBALAMIN (VITAMIN B-12) 1000 MCG
1 TABLET ORAL 2 TIMES DAILY
COMMUNITY

## 2025-03-19 RX ORDER — NITROGLYCERIN 0.4 MG/1
0.4 TABLET SUBLINGUAL EVERY 5 MIN PRN
Status: DISCONTINUED | OUTPATIENT
Start: 2025-03-19 | End: 2025-03-19 | Stop reason: HOSPADM

## 2025-03-19 RX ORDER — SODIUM CHLORIDE 0.9 % (FLUSH) 0.9 %
5-40 SYRINGE (ML) INJECTION EVERY 12 HOURS SCHEDULED
Status: DISCONTINUED | OUTPATIENT
Start: 2025-03-19 | End: 2025-03-19 | Stop reason: HOSPADM

## 2025-03-19 RX ORDER — MIDAZOLAM HYDROCHLORIDE 1 MG/ML
INJECTION, SOLUTION INTRAMUSCULAR; INTRAVENOUS PRN
Status: DISCONTINUED | OUTPATIENT
Start: 2025-03-19 | End: 2025-03-19 | Stop reason: HOSPADM

## 2025-03-19 RX ORDER — DIPHENHYDRAMINE HYDROCHLORIDE 50 MG/ML
50 INJECTION, SOLUTION INTRAMUSCULAR; INTRAVENOUS ONCE
Status: DISCONTINUED | OUTPATIENT
Start: 2025-03-19 | End: 2025-03-19 | Stop reason: HOSPADM

## 2025-03-19 RX ORDER — FENTANYL CITRATE 50 UG/ML
INJECTION, SOLUTION INTRAMUSCULAR; INTRAVENOUS PRN
Status: DISCONTINUED | OUTPATIENT
Start: 2025-03-19 | End: 2025-03-19 | Stop reason: HOSPADM

## 2025-03-19 RX ORDER — IOPAMIDOL 755 MG/ML
INJECTION, SOLUTION INTRAVASCULAR PRN
Status: DISCONTINUED | OUTPATIENT
Start: 2025-03-19 | End: 2025-03-19 | Stop reason: HOSPADM

## 2025-03-19 RX ORDER — ATROPINE SULFATE 0.4 MG/ML
0.5 INJECTION, SOLUTION INTRAVENOUS
Status: DISCONTINUED | OUTPATIENT
Start: 2025-03-19 | End: 2025-03-19 | Stop reason: HOSPADM

## 2025-03-19 RX ORDER — SODIUM CHLORIDE 9 MG/ML
INJECTION, SOLUTION INTRAVENOUS PRN
Status: DISCONTINUED | OUTPATIENT
Start: 2025-03-19 | End: 2025-03-19 | Stop reason: HOSPADM

## 2025-03-19 RX ORDER — LIDOCAINE HYDROCHLORIDE 20 MG/ML
INJECTION, SOLUTION EPIDURAL; INFILTRATION; INTRACAUDAL; PERINEURAL PRN
Status: DISCONTINUED | OUTPATIENT
Start: 2025-03-19 | End: 2025-03-19 | Stop reason: HOSPADM

## 2025-03-19 RX ORDER — ASCORBIC ACID 500 MG
500 TABLET ORAL DAILY
COMMUNITY

## 2025-03-19 RX ORDER — ACETAMINOPHEN 325 MG/1
650 TABLET ORAL EVERY 4 HOURS PRN
Status: DISCONTINUED | OUTPATIENT
Start: 2025-03-19 | End: 2025-03-19 | Stop reason: HOSPADM

## 2025-03-19 RX ORDER — SODIUM CHLORIDE 0.9 % (FLUSH) 0.9 %
5-40 SYRINGE (ML) INJECTION PRN
Status: DISCONTINUED | OUTPATIENT
Start: 2025-03-19 | End: 2025-03-19 | Stop reason: HOSPADM

## 2025-03-19 RX ORDER — HYDROCORTISONE SODIUM SUCCINATE 100 MG/2ML
200 INJECTION INTRAMUSCULAR; INTRAVENOUS ONCE
Status: DISCONTINUED | OUTPATIENT
Start: 2025-03-19 | End: 2025-03-19 | Stop reason: HOSPADM

## 2025-03-19 RX ORDER — ASPIRIN 325 MG
325 TABLET ORAL ONCE
Status: COMPLETED | OUTPATIENT
Start: 2025-03-19 | End: 2025-03-19

## 2025-03-19 RX ADMIN — SODIUM CHLORIDE: 0.9 INJECTION, SOLUTION INTRAVENOUS at 12:23

## 2025-03-19 RX ADMIN — ASPIRIN 325 MG: 325 TABLET ORAL at 12:31

## 2025-03-19 ASSESSMENT — PAIN SCALES - GENERAL: PAINLEVEL_OUTOF10: 0

## 2025-03-19 NOTE — PLAN OF CARE
Problem: Pain  Goal: Verbalizes/displays adequate comfort level or baseline comfort level  Outcome: Progressing     Problem: Cardiovascular - Adult  Goal: Maintains optimal cardiac output and hemodynamic stability  Outcome: Progressing  Flowsheets (Taken 3/19/2025 1209)  Maintains optimal cardiac output and hemodynamic stability:   Monitor blood pressure and heart rate   Monitor urine output and notify Licensed Independent Practitioner for values outside of normal range     Problem: Discharge Planning  Goal: Discharge to home or other facility with appropriate resources  Outcome: Progressing  Flowsheets (Taken 3/19/2025 1209)  Discharge to home or other facility with appropriate resources:   Identify barriers to discharge with patient and caregiver   Arrange for needed discharge resources and transportation as appropriate   Care plan reviewed with patient.  Patient verbalizes understanding of the plan of care and contributes to goal setting.

## 2025-03-19 NOTE — PROGRESS NOTES
1405 Care taken over from cath lab, right groin stable . Patient reinstructed on bedrest, instructed to keep legs straight, not to cross legs, not to lift head off of pillow, not to laugh hard, if coughs to guard site with hand, voices understanding.

## 2025-03-19 NOTE — PROGRESS NOTES
1200 Patient admitted to 2E11  ambulatory for Heart Cath.  Patient NPO. Patient accompanied by wife, Arminda.  Vital signs obtained.   Assessment and data collection intiated.   Oriented to room.  Policies and procedures for 2E explained.   All questions answered with no further questions at this time.   Fall prevention and safety precautions discussed with patient.     1215 Spoke with patient regarding holding metformin after procedure due to dye reaction, voices understanding, instructed patient additional instructions on discharge.

## 2025-03-19 NOTE — FLOWSHEET NOTE
03/19/25 9278   AVS Reviewed   AVS & discharge instructions reviewed with patient and/or representative? Yes   Reviewed instructions with Patient;Other (name and relationship in comment)  (wife, Arminda)   Level of Understanding Questions answered;Teach back completed;Verbalized understanding;Return demonstration       Pt has no further questions at this time.  Belongings gathered.  Pt leaving for home with wife.

## 2025-03-19 NOTE — DISCHARGE INSTRUCTIONS
Discharge Instructions for Femoral Heart Catheterization  Take it easy for 3-4 days, limit vigorous activity (contact sports) for 2 weeks  No driving for 2 days  No lifting over 5 lbs for 1 week, this is a half gallon of milk  May shower after 24 hours  May remove dressing and apply band-aid after 24 hours.   Apply a clean band-aid daily for 5 days over the incision.  No creams, ointments, or powders near site for 2 weeks.  No hot tub, swimming or tub bath for 1 week  Gently clean your site daily using soap and water while standing in the shower. Dry thoroughly.  10. Monitor site for infection- redness, increased pain, hardness or drainage at site, elevated temperature, poor healing of incision, fever, chills.   11. If bleeding from incision, apply pressure and call 911 immediately.

## 2025-04-08 ENCOUNTER — TELEPHONE (OUTPATIENT)
Dept: CARDIOLOGY CLINIC | Age: 75
End: 2025-04-08

## 2025-04-08 DIAGNOSIS — I49.3 PVC (PREMATURE VENTRICULAR CONTRACTION): ICD-10-CM

## 2025-04-08 DIAGNOSIS — I49.3 PVC (PREMATURE VENTRICULAR CONTRACTION): Primary | ICD-10-CM

## 2025-04-08 NOTE — TELEPHONE ENCOUNTER
Yes we did. I see it and it was good   We tried different meds and his PVCs are still way too many even with amiodarone  So he might need ablation   I would refer to lino at least for an opinion   He might try another trial of medication on him anyways

## 2025-04-08 NOTE — TELEPHONE ENCOUNTER
Tell him he still having too many PVCS and amiodarone is not working either  He needs to see Jono and might need ablation   Will see how the cath looks like and refer to Jono after the cath is done

## 2025-04-08 NOTE — TELEPHONE ENCOUNTER
Call to patient,   Patient had cath completed on 03/19/2025  He is asking if there is another med to try or should we go ahead and refer to Jono?

## 2025-04-09 NOTE — TELEPHONE ENCOUNTER
Patient is scheduled for 05.07.2025 at 300pm   Patient was offered a sooner apt-  however  declined due to having another apt.

## 2025-05-05 NOTE — PATIENT INSTRUCTIONS
If your physician has ordered procedures/ testing and you have not been contacted with in 2 days after your office visit,  please call our office.     If you have had your testing performed -  please allow 48 hours for our office to notify you of the results.  If you have not heard from us with in the 48 hrs,  please call the office.     Results for the 14 day and 30 day heart monitor - please allow 7-10 business days after the monitor is mailed back.    You may receive a survey regarding the care you received during your visit.  Your input is valuable to us.  We encourage you to complete and return your survey.  We hope you will choose us in the future for your healthcare needs.  Thank you for choosing [facility]!    Your Medical Assistant Today:  Ban SANTOS   Your RN Today:  Amy SANTOS   Your Provider for Today: Dr. De La Cruz  Ph. [phone] opt 1    Have A Great Day!

## 2025-05-07 ENCOUNTER — OFFICE VISIT (OUTPATIENT)
Dept: CARDIOLOGY CLINIC | Age: 75
End: 2025-05-07
Payer: MEDICARE

## 2025-05-07 VITALS
DIASTOLIC BLOOD PRESSURE: 80 MMHG | HEART RATE: 76 BPM | OXYGEN SATURATION: 96 % | BODY MASS INDEX: 40.09 KG/M2 | SYSTOLIC BLOOD PRESSURE: 164 MMHG | HEIGHT: 72 IN | WEIGHT: 296 LBS

## 2025-05-07 DIAGNOSIS — I49.3 PVC (PREMATURE VENTRICULAR CONTRACTION): Primary | ICD-10-CM

## 2025-05-07 DIAGNOSIS — E78.01 FAMILIAL HYPERCHOLESTEROLEMIA: ICD-10-CM

## 2025-05-07 DIAGNOSIS — I10 PRIMARY HYPERTENSION: ICD-10-CM

## 2025-05-07 PROCEDURE — 93000 ELECTROCARDIOGRAM COMPLETE: CPT | Performed by: INTERNAL MEDICINE

## 2025-05-07 PROCEDURE — 3017F COLORECTAL CA SCREEN DOC REV: CPT | Performed by: INTERNAL MEDICINE

## 2025-05-07 PROCEDURE — 1123F ACP DISCUSS/DSCN MKR DOCD: CPT | Performed by: INTERNAL MEDICINE

## 2025-05-07 PROCEDURE — 3077F SYST BP >= 140 MM HG: CPT | Performed by: INTERNAL MEDICINE

## 2025-05-07 PROCEDURE — 99205 OFFICE O/P NEW HI 60 MIN: CPT | Performed by: INTERNAL MEDICINE

## 2025-05-07 PROCEDURE — 1036F TOBACCO NON-USER: CPT | Performed by: INTERNAL MEDICINE

## 2025-05-07 PROCEDURE — G8427 DOCREV CUR MEDS BY ELIG CLIN: HCPCS | Performed by: INTERNAL MEDICINE

## 2025-05-07 PROCEDURE — 3079F DIAST BP 80-89 MM HG: CPT | Performed by: INTERNAL MEDICINE

## 2025-05-07 PROCEDURE — 1159F MED LIST DOCD IN RCRD: CPT | Performed by: INTERNAL MEDICINE

## 2025-05-07 PROCEDURE — G8417 CALC BMI ABV UP PARAM F/U: HCPCS | Performed by: INTERNAL MEDICINE

## 2025-05-07 NOTE — PROGRESS NOTES
[Cefazolin Sodium] Rash     Feels hot            Medications:  Current Outpatient Medications   Medication Sig Dispense Refill    Cyanocobalamin (B-12) 1000 MCG TABS Take 1 tablet by mouth 2 times daily      vitamin C (ASCORBIC ACID) 500 MG tablet Take 1 tablet by mouth daily      amiodarone (CORDARONE) 200 MG tablet 200 mg PO BID x 7 days, then 200 mg daily thereafter 90 tablet 3    meclizine (ANTIVERT) 12.5 MG tablet Take 1 tablet by mouth 3 times daily as needed      trospium (SANCTURA) 20 MG tablet TAKE 1 TABLET BY MOUTH TWICE A  tablet 3    metFORMIN (GLUCOPHAGE) 500 MG tablet Take 2 tablets by mouth 2 times daily (with meals)      blood glucose test strips (ONETOUCH VERIO) strip 1 strip by NOT APPLICABLE route daily      metoprolol tartrate (LOPRESSOR) 25 MG tablet Take 0.5 tablets by mouth 2 times daily      ELDERBERRY PO Take by mouth Gummies 50mg daily  eldderberry 50mg, vit c 45mg, zinc 3.8mg      Potassium (POTASSIMIN) 75 MG TABS Take 1 tablet by mouth daily      atorvastatin (LIPITOR) 80 MG tablet Take 1 tablet by mouth nightly 30 tablet 3    pioglitazone (ACTOS) 15 MG tablet Take 2 tablets by mouth daily      magnesium (MAGNESIUM-OXIDE) 250 MG TABS tablet Take 1 tablet by mouth 2 times daily      Cholecalciferol (VITAMIN D3) 50 MCG (2000 UT) CAPS Take by mouth      fenofibrate (TRICOR) 145 MG tablet Take 1 tablet by mouth daily s [facility] pharmacy: Please dispense generic fenofibrate unless prescriber denote      furosemide (LASIX) 20 MG tablet Take 1 tablet by mouth every other day      Lysine HCl 500 MG CAPS Take 1 capsule by mouth daily      benazepril (LOTENSIN) 20 MG tablet Take 2 tablets by mouth daily      levothyroxine (SYNTHROID) 25 MCG tablet Take 1 tablet by mouth Daily      Multiple Vitamins-Minerals (CENTRUM SILVER ADULT 50+ PO) Take by mouth       No current facility-administered medications for this visit.          Physical Examination:  Vitals:    05/07/25 1436   BP: (!) 164/80

## 2025-05-08 ENCOUNTER — TELEPHONE (OUTPATIENT)
Dept: CARDIOLOGY CLINIC | Age: 75
End: 2025-05-08

## 2025-05-08 NOTE — TELEPHONE ENCOUNTER
OV 05.07.2025  Assessment And Recommendations:  PVC< high burden, Echo shows normal LVEF. Multimorphic. He has no sx associated w this. He has had no significant fatigue. Able to do most activities. I have recommended stopping amidoarone as it is not effective. Given preserved LVEF and no significant sx, ablation is unlikely to offer any significant benefit.      HTN, BP elevated.  Switch to entresto for better BP control. Asked to wait util Fri to start. Stop benazapril (needs 72 hours washout).      Diastolic HF, add entresto. May consider reducing lasix to half as entresto will potentate diuretic effect     Patient is calling -  LMOM -the entresto is too expensive-  would like to discuss other options.  Should he restart the benazapril?

## 2025-05-09 RX ORDER — BENAZEPRIL HYDROCHLORIDE 40 MG/1
40 TABLET ORAL DAILY
COMMUNITY

## 2025-05-13 ENCOUNTER — OFFICE VISIT (OUTPATIENT)
Dept: CARDIOLOGY CLINIC | Age: 75
End: 2025-05-13
Payer: MEDICARE

## 2025-05-13 VITALS
WEIGHT: 293.8 LBS | HEIGHT: 72 IN | BODY MASS INDEX: 39.8 KG/M2 | DIASTOLIC BLOOD PRESSURE: 97 MMHG | HEART RATE: 80 BPM | SYSTOLIC BLOOD PRESSURE: 188 MMHG

## 2025-05-13 DIAGNOSIS — I49.3 PVC (PREMATURE VENTRICULAR CONTRACTION): ICD-10-CM

## 2025-05-13 DIAGNOSIS — I25.10 CORONARY ARTERY DISEASE INVOLVING NATIVE CORONARY ARTERY OF NATIVE HEART WITHOUT ANGINA PECTORIS: ICD-10-CM

## 2025-05-13 DIAGNOSIS — I10 PRIMARY HYPERTENSION: Primary | ICD-10-CM

## 2025-05-13 DIAGNOSIS — E78.01 FAMILIAL HYPERCHOLESTEROLEMIA: ICD-10-CM

## 2025-05-13 PROCEDURE — 3077F SYST BP >= 140 MM HG: CPT | Performed by: NUCLEAR MEDICINE

## 2025-05-13 PROCEDURE — 1036F TOBACCO NON-USER: CPT | Performed by: NUCLEAR MEDICINE

## 2025-05-13 PROCEDURE — 3080F DIAST BP >= 90 MM HG: CPT | Performed by: NUCLEAR MEDICINE

## 2025-05-13 PROCEDURE — G8417 CALC BMI ABV UP PARAM F/U: HCPCS | Performed by: NUCLEAR MEDICINE

## 2025-05-13 PROCEDURE — 1159F MED LIST DOCD IN RCRD: CPT | Performed by: NUCLEAR MEDICINE

## 2025-05-13 PROCEDURE — G8427 DOCREV CUR MEDS BY ELIG CLIN: HCPCS | Performed by: NUCLEAR MEDICINE

## 2025-05-13 PROCEDURE — 3017F COLORECTAL CA SCREEN DOC REV: CPT | Performed by: NUCLEAR MEDICINE

## 2025-05-13 PROCEDURE — 1123F ACP DISCUSS/DSCN MKR DOCD: CPT | Performed by: NUCLEAR MEDICINE

## 2025-05-13 PROCEDURE — 99214 OFFICE O/P EST MOD 30 MIN: CPT | Performed by: NUCLEAR MEDICINE

## 2025-05-13 RX ORDER — VALSARTAN 320 MG/1
320 TABLET ORAL DAILY
Qty: 90 TABLET | Refills: 3 | Status: SHIPPED | OUTPATIENT
Start: 2025-05-13

## 2025-05-13 RX ORDER — LANOLIN ALCOHOL/MO/W.PET/CERES
1000 CREAM (GRAM) TOPICAL 2 TIMES DAILY
COMMUNITY

## 2025-05-13 NOTE — PROGRESS NOTES
[facility] [place] [facility]  [facility] CARDIOLOGY  [address] [place] [address]  Dept: [phone]  Dept Fax: [phone]  Loc: [phone]    Visit Date: 5/13/2025    Ashok Blevins is a 74 y.o. male who presents todayfor:  Chief Complaint   Patient presents with    Follow-up     Holter     Hypertension    Coronary Artery Disease    Palpitations    Hyperlipidemia   Cath done  Mild CAD  No complications  Seen Jono for PVCs  Plan was for medical Rx   Didn't improve with the amiodarone and or flecainide  Doesn't feel the PVCs   No chest pain  No changes in breathing  Some vertigo   Higher BP lately   No syncope  On statins for hyperlipidemia  No issues with the meds  Dm is so so   Does have sleep apnea on CPAP       HPI:  HPI  Past Medical History:   Diagnosis Date    Arthritis     neck and knees    Cervical pain 08/10/2016    pain injection @ [facility], Dr. Vásquez     Diabetes mellitus (HCC)     History of kidney stones     Hypertension     DELMER on CPAP     Prostate cancer (HCC)     Shingles 09/04/2016    Strain of quadriceps muscle     left    Thyroid disease       Past Surgical History:   Procedure Laterality Date    CARDIAC PROCEDURE N/A 03/19/2025    Left heart cath / coronary angiography performed by Joceline Sneed MD at [facility] CARDIAC CATH LAB    COLONOSCOPY  03/10/2017    Dr. Stahl    CYSTOSCOPY  03/20/2017    bladder neck incision    HERNIA REPAIR  2008    umbilical--Dr. Coburn    KNEE SURGERY Right 06/2024    LEG TENDON SURGERY  03/2008    left quad muscle repair    NECK SURGERY  09/2017    PROSTATE SURGERY  12/2008    Dr. Yip    TONSILLECTOMY  1958    as child     Family History   Problem Relation Age of Onset    Dementia Father     No Known Problems Sister     Cancer Brother         prostate     Social History     Tobacco Use    Smoking status: Never    Smokeless tobacco: Never   Substance Use Topics    Alcohol use: Yes     Alcohol/week: 0.0

## 2025-06-03 ENCOUNTER — TELEPHONE (OUTPATIENT)
Dept: CARDIOLOGY CLINIC | Age: 75
End: 2025-06-03

## 2025-06-03 RX ORDER — BENAZEPRIL HYDROCHLORIDE 40 MG/1
40 TABLET ORAL DAILY
COMMUNITY

## 2025-06-03 NOTE — TELEPHONE ENCOUNTER
Pt saw Dr Sorenson 5/13/25-  Benazepril 40 mg was changed to Diovan 320 mg QD.     Pt calling stating he has tried x 3 weeks and it has not changed his SBP at all still in the 150's and he is extremely fatigued with Diovan as well.     Please advise.

## 2025-06-03 NOTE — TELEPHONE ENCOUNTER
Discussed with Delmar.  He would like for the patient to Continue Diovan and add Aldactone.  If he really pushes and does not want to continue Diovan at all, restart Benazepril and add aldactone.

## 2025-06-03 NOTE — TELEPHONE ENCOUNTER
Spoke to patient.   He does not like the way the Valsartan is making him feel.   It makes him feel tired all the time. He feels \"gumpy, sad, mad all the time.\"  He is going to stop the Valsartan and go back to the benazepril 40 mg daily.  Not interested in the Spirinolactone.  He does note wish to start another water pill.  He states his BP is still running high, 140s-160s  Explained the Spironolactone would help with BP control.  He is still not interested in another diuretic. States he's \"peeing all the time\" with what he is already on.   Send to Dr Sorenson once he is back next week for further recommendations.

## 2025-06-09 NOTE — TELEPHONE ENCOUNTER
I called and talked to patient and states he stopped the Verapamil and started back on the Benazepril and feels a lot better but SBP is 155 this morning.   He states he previously had been on the Metoprolol 25mg BID and had to decrease the dose due to HR in the 30s and almost passed out.   He states he will not go up to a full tablet of the metoprolol twice a day and doesn't really want any more medication adjustments.

## 2025-07-31 DIAGNOSIS — C61 PROSTATE CANCER (HCC): Primary | ICD-10-CM

## 2025-08-07 ENCOUNTER — OFFICE VISIT (OUTPATIENT)
Dept: UROLOGY | Age: 75
End: 2025-08-07

## 2025-08-07 VITALS
DIASTOLIC BLOOD PRESSURE: 72 MMHG | HEIGHT: 72 IN | WEIGHT: 301.4 LBS | SYSTOLIC BLOOD PRESSURE: 148 MMHG | BODY MASS INDEX: 40.82 KG/M2

## 2025-08-07 DIAGNOSIS — C61 PROSTATE CANCER (HCC): Primary | ICD-10-CM

## 2025-08-07 DIAGNOSIS — R39.15 URGENCY OF URINATION: ICD-10-CM

## 2025-08-07 LAB — POST VOID RESIDUAL (PVR): 50 ML

## 2025-08-07 RX ORDER — AMIODARONE HYDROCHLORIDE 200 MG/1
200 TABLET ORAL DAILY
COMMUNITY

## 2025-08-07 RX ORDER — SEMAGLUTIDE 0.25 MG/.5ML
0.25 INJECTION, SOLUTION SUBCUTANEOUS
COMMUNITY
Start: 2025-08-01

## 2025-08-07 RX ORDER — TROSPIUM CHLORIDE 20 MG/1
20 TABLET, FILM COATED ORAL 2 TIMES DAILY
Qty: 180 TABLET | Refills: 3 | Status: SHIPPED | OUTPATIENT
Start: 2025-08-07

## 2025-08-07 ASSESSMENT — ENCOUNTER SYMPTOMS
VOMITING: 0
BACK PAIN: 0
ABDOMINAL PAIN: 0
NAUSEA: 0

## 2025-08-11 RX ORDER — VALSARTAN 320 MG/1
320 TABLET ORAL DAILY
Qty: 90 TABLET | Refills: 3 | Status: SHIPPED | OUTPATIENT
Start: 2025-08-11

## (undated) DEVICE — GUIDEWIRE VASC L260CM DIA0.035IN RAD 3MM J TIP L7CM PTFE

## (undated) DEVICE — GLIDESHEATH SLENDER NITINOL HYDROPHILIC COATED INTRODUCER SHEATH: Brand: GLIDESHEATH SLENDER

## (undated) DEVICE — CATHETER DIAG 5FR L100CM LUMN ID0.047IN JL3.5 CRV 0 SIDE H

## (undated) DEVICE — Device

## (undated) DEVICE — PINNACLE INTRODUCER SHEATH: Brand: PINNACLE

## (undated) DEVICE — ON - ST. RITAS VASC: Brand: MEDLINE INDUSTRIES, INC.

## (undated) DEVICE — GUIDEWIRE VASC L150CM DIA0.035IN FLX END L7CM J 3MM PTFE

## (undated) DEVICE — TIBURON NEONATAL DRAPE: Brand: CONVERTORS

## (undated) DEVICE — CATHETER COR DIAG JUDKINS L 4.5 CRV 5FR 100CM 0 SIDE H

## (undated) DEVICE — DRAPE KIT RAMAPR RADIATION SHIELD

## (undated) DEVICE — CATHETER DIAG 5FR L100CM LUMN ID0.047IN JR4 CRV 0 SIDE H

## (undated) DEVICE — BAND COMPR L24CM REG CLR PLAS HEMSTAT EXT HK AND LOOP RETEN